# Patient Record
Sex: MALE | Race: WHITE | Employment: OTHER | URBAN - METROPOLITAN AREA
[De-identification: names, ages, dates, MRNs, and addresses within clinical notes are randomized per-mention and may not be internally consistent; named-entity substitution may affect disease eponyms.]

---

## 2017-01-01 ENCOUNTER — PATIENT OUTREACH (OUTPATIENT)
Dept: HEMATOLOGY | Age: 77
End: 2017-01-01

## 2017-01-01 ENCOUNTER — APPOINTMENT (OUTPATIENT)
Dept: ULTRASOUND IMAGING | Age: 77
End: 2017-01-01
Attending: PHYSICIAN ASSISTANT
Payer: MEDICARE

## 2017-01-01 ENCOUNTER — HOSPITAL ENCOUNTER (INPATIENT)
Age: 77
LOS: 4 days | Discharge: HOME HEALTH | DRG: 683 | End: 2017-03-04
Attending: EMERGENCY MEDICINE | Admitting: HOSPITALIST
Payer: MEDICARE

## 2017-01-01 ENCOUNTER — OFFICE VISIT (OUTPATIENT)
Dept: HEMATOLOGY | Age: 77
End: 2017-01-01

## 2017-01-01 ENCOUNTER — HOSPICE ADMISSION (OUTPATIENT)
Dept: HOSPICE | Facility: HOSPICE | Age: 77
End: 2017-01-01
Payer: MEDICARE

## 2017-01-01 ENCOUNTER — HOSPITAL ENCOUNTER (EMERGENCY)
Age: 77
Discharge: HOME | End: 2017-01-27
Attending: EMERGENCY MEDICINE
Payer: MEDICARE

## 2017-01-01 ENCOUNTER — HOME CARE VISIT (OUTPATIENT)
Dept: SCHEDULING | Facility: HOME HEALTH | Age: 77
End: 2017-01-01
Payer: MEDICARE

## 2017-01-01 ENCOUNTER — HOSPITAL ENCOUNTER (OUTPATIENT)
Dept: ULTRASOUND IMAGING | Age: 77
Discharge: HOME | End: 2017-03-13
Attending: INTERNAL MEDICINE
Payer: MEDICARE

## 2017-01-01 ENCOUNTER — APPOINTMENT (OUTPATIENT)
Dept: MRI IMAGING | Age: 77
DRG: 683 | End: 2017-01-01
Attending: HOSPITALIST
Payer: MEDICARE

## 2017-01-01 ENCOUNTER — HOME CARE VISIT (OUTPATIENT)
Dept: HOSPICE | Facility: HOSPICE | Age: 77
End: 2017-01-01
Payer: MEDICARE

## 2017-01-01 ENCOUNTER — APPOINTMENT (OUTPATIENT)
Dept: GENERAL RADIOLOGY | Age: 77
End: 2017-01-01
Attending: PHYSICIAN ASSISTANT
Payer: MEDICARE

## 2017-01-01 ENCOUNTER — HOSPITAL ENCOUNTER (OUTPATIENT)
Dept: MRI IMAGING | Age: 77
Discharge: HOME | End: 2017-01-19
Attending: INTERNAL MEDICINE
Payer: MEDICARE

## 2017-01-01 ENCOUNTER — HOSPITAL ENCOUNTER (OUTPATIENT)
Dept: INTERVENTIONAL RADIOLOGY/VASCULAR | Age: 77
Discharge: HOME | End: 2017-01-30
Attending: INTERNAL MEDICINE | Admitting: INTERNAL MEDICINE
Payer: MEDICARE

## 2017-01-01 ENCOUNTER — APPOINTMENT (OUTPATIENT)
Dept: NUCLEAR MEDICINE | Age: 77
End: 2017-01-01
Attending: INTERNAL MEDICINE
Payer: MEDICARE

## 2017-01-01 ENCOUNTER — HOSPITAL ENCOUNTER (OUTPATIENT)
Dept: INTERVENTIONAL RADIOLOGY/VASCULAR | Age: 77
Discharge: HOME | End: 2017-02-02
Attending: INTERNAL MEDICINE | Admitting: INTERNAL MEDICINE
Payer: MEDICARE

## 2017-01-01 ENCOUNTER — HOSPITAL ENCOUNTER (OUTPATIENT)
Dept: RADIATION THERAPY | Age: 77
Discharge: HOME | End: 2017-02-02
Attending: INTERNAL MEDICINE | Admitting: INTERNAL MEDICINE
Payer: MEDICARE

## 2017-01-01 ENCOUNTER — TELEPHONE (OUTPATIENT)
Dept: HEMATOLOGY | Age: 77
End: 2017-01-01

## 2017-01-01 ENCOUNTER — HOSPITAL ENCOUNTER (OUTPATIENT)
Dept: ULTRASOUND IMAGING | Age: 77
Discharge: HOME | End: 2017-02-13
Attending: INTERNAL MEDICINE
Payer: MEDICARE

## 2017-01-01 ENCOUNTER — HOSPITAL ENCOUNTER (OUTPATIENT)
Dept: INTERVENTIONAL RADIOLOGY/VASCULAR | Age: 77
Discharge: HOME | End: 2017-03-27
Attending: RADIOLOGY | Admitting: RADIOLOGY
Payer: MEDICARE

## 2017-01-01 ENCOUNTER — APPOINTMENT (OUTPATIENT)
Dept: ULTRASOUND IMAGING | Age: 77
DRG: 683 | End: 2017-01-01
Attending: HOSPITALIST
Payer: MEDICARE

## 2017-01-01 ENCOUNTER — HOSPITAL ENCOUNTER (OUTPATIENT)
Dept: RADIATION THERAPY | Age: 77
Discharge: HOME | End: 2017-01-31
Payer: MEDICARE

## 2017-01-01 ENCOUNTER — HOSPITAL ENCOUNTER (OUTPATIENT)
Dept: ULTRASOUND IMAGING | Age: 77
Discharge: HOME | End: 2017-02-23
Attending: INTERNAL MEDICINE
Payer: MEDICARE

## 2017-01-01 ENCOUNTER — HOSPITAL ENCOUNTER (OUTPATIENT)
Dept: ULTRASOUND IMAGING | Age: 77
Discharge: HOME | End: 2017-03-20
Attending: INTERNAL MEDICINE
Payer: MEDICARE

## 2017-01-01 ENCOUNTER — HOSPITAL ENCOUNTER (OUTPATIENT)
Dept: INTERVENTIONAL RADIOLOGY/VASCULAR | Age: 77
Discharge: HOME | End: 2017-03-22
Attending: INTERNAL MEDICINE | Admitting: INTERNAL MEDICINE
Payer: MEDICARE

## 2017-01-01 ENCOUNTER — APPOINTMENT (OUTPATIENT)
Dept: GENERAL RADIOLOGY | Age: 77
DRG: 683 | End: 2017-01-01
Attending: EMERGENCY MEDICINE
Payer: MEDICARE

## 2017-01-01 VITALS
SYSTOLIC BLOOD PRESSURE: 127 MMHG | RESPIRATION RATE: 20 BRPM | HEART RATE: 75 BPM | TEMPERATURE: 97.7 F | DIASTOLIC BLOOD PRESSURE: 45 MMHG | OXYGEN SATURATION: 100 %

## 2017-01-01 VITALS — SYSTOLIC BLOOD PRESSURE: 116 MMHG | HEART RATE: 76 BPM | RESPIRATION RATE: 18 BRPM | DIASTOLIC BLOOD PRESSURE: 64 MMHG

## 2017-01-01 VITALS
RESPIRATION RATE: 28 BRPM | DIASTOLIC BLOOD PRESSURE: 56 MMHG | HEART RATE: 108 BPM | OXYGEN SATURATION: 95 % | SYSTOLIC BLOOD PRESSURE: 110 MMHG

## 2017-01-01 VITALS
HEART RATE: 67 BPM | HEIGHT: 74 IN | WEIGHT: 290 LBS | OXYGEN SATURATION: 93 % | DIASTOLIC BLOOD PRESSURE: 53 MMHG | SYSTOLIC BLOOD PRESSURE: 118 MMHG | BODY MASS INDEX: 37.22 KG/M2 | TEMPERATURE: 97.2 F

## 2017-01-01 VITALS
RESPIRATION RATE: 14 BRPM | DIASTOLIC BLOOD PRESSURE: 40 MMHG | TEMPERATURE: 98.4 F | SYSTOLIC BLOOD PRESSURE: 129 MMHG | BODY MASS INDEX: 34.01 KG/M2 | WEIGHT: 265 LBS | OXYGEN SATURATION: 100 % | HEART RATE: 69 BPM | HEIGHT: 74 IN

## 2017-01-01 VITALS
WEIGHT: 267 LBS | HEIGHT: 74 IN | BODY MASS INDEX: 34.27 KG/M2 | HEART RATE: 71 BPM | RESPIRATION RATE: 20 BRPM | DIASTOLIC BLOOD PRESSURE: 53 MMHG | SYSTOLIC BLOOD PRESSURE: 108 MMHG | OXYGEN SATURATION: 99 % | TEMPERATURE: 98.2 F

## 2017-01-01 VITALS
TEMPERATURE: 97.8 F | HEIGHT: 74 IN | SYSTOLIC BLOOD PRESSURE: 126 MMHG | BODY MASS INDEX: 36.7 KG/M2 | OXYGEN SATURATION: 97 % | WEIGHT: 286 LBS | HEART RATE: 61 BPM | DIASTOLIC BLOOD PRESSURE: 61 MMHG | RESPIRATION RATE: 16 BRPM

## 2017-01-01 VITALS
DIASTOLIC BLOOD PRESSURE: 46 MMHG | HEART RATE: 81 BPM | OXYGEN SATURATION: 94 % | WEIGHT: 295 LBS | TEMPERATURE: 97.8 F | HEIGHT: 74 IN | RESPIRATION RATE: 22 BRPM | BODY MASS INDEX: 37.86 KG/M2 | SYSTOLIC BLOOD PRESSURE: 121 MMHG

## 2017-01-01 VITALS
SYSTOLIC BLOOD PRESSURE: 150 MMHG | BODY MASS INDEX: 33.65 KG/M2 | OXYGEN SATURATION: 99 % | DIASTOLIC BLOOD PRESSURE: 67 MMHG | TEMPERATURE: 98.2 F | WEIGHT: 262.2 LBS | HEART RATE: 62 BPM | HEIGHT: 74 IN | RESPIRATION RATE: 18 BRPM

## 2017-01-01 VITALS — BODY MASS INDEX: 36.21 KG/M2 | WEIGHT: 282 LBS

## 2017-01-01 VITALS
TEMPERATURE: 96.6 F | BODY MASS INDEX: 35.29 KG/M2 | DIASTOLIC BLOOD PRESSURE: 54 MMHG | HEART RATE: 69 BPM | WEIGHT: 275 LBS | SYSTOLIC BLOOD PRESSURE: 99 MMHG | HEIGHT: 74 IN

## 2017-01-01 VITALS
WEIGHT: 290 LBS | HEART RATE: 61 BPM | TEMPERATURE: 97.4 F | RESPIRATION RATE: 18 BRPM | BODY MASS INDEX: 37.22 KG/M2 | OXYGEN SATURATION: 96 % | HEIGHT: 74 IN | DIASTOLIC BLOOD PRESSURE: 58 MMHG | SYSTOLIC BLOOD PRESSURE: 146 MMHG

## 2017-01-01 VITALS
SYSTOLIC BLOOD PRESSURE: 164 MMHG | TEMPERATURE: 97.8 F | DIASTOLIC BLOOD PRESSURE: 60 MMHG | WEIGHT: 289 LBS | HEIGHT: 74 IN | RESPIRATION RATE: 18 BRPM | OXYGEN SATURATION: 98 % | BODY MASS INDEX: 37.09 KG/M2 | HEART RATE: 68 BPM

## 2017-01-01 VITALS
HEIGHT: 74 IN | HEART RATE: 82 BPM | WEIGHT: 280 LBS | DIASTOLIC BLOOD PRESSURE: 70 MMHG | OXYGEN SATURATION: 98 % | BODY MASS INDEX: 35.94 KG/M2 | TEMPERATURE: 97.9 F | SYSTOLIC BLOOD PRESSURE: 124 MMHG

## 2017-01-01 VITALS
DIASTOLIC BLOOD PRESSURE: 52 MMHG | HEART RATE: 81 BPM | RESPIRATION RATE: 21 BRPM | SYSTOLIC BLOOD PRESSURE: 141 MMHG | OXYGEN SATURATION: 97 %

## 2017-01-01 VITALS
BODY MASS INDEX: 34.27 KG/M2 | RESPIRATION RATE: 18 BRPM | HEART RATE: 80 BPM | SYSTOLIC BLOOD PRESSURE: 120 MMHG | WEIGHT: 267 LBS | HEIGHT: 74 IN | DIASTOLIC BLOOD PRESSURE: 57 MMHG | OXYGEN SATURATION: 97 %

## 2017-01-01 VITALS
RESPIRATION RATE: 16 BRPM | DIASTOLIC BLOOD PRESSURE: 43 MMHG | SYSTOLIC BLOOD PRESSURE: 109 MMHG | HEART RATE: 60 BPM | TEMPERATURE: 97.7 F | OXYGEN SATURATION: 98 %

## 2017-01-01 VITALS
SYSTOLIC BLOOD PRESSURE: 124 MMHG | RESPIRATION RATE: 16 BRPM | DIASTOLIC BLOOD PRESSURE: 60 MMHG | HEART RATE: 72 BPM | OXYGEN SATURATION: 96 %

## 2017-01-01 VITALS — DIASTOLIC BLOOD PRESSURE: 60 MMHG | RESPIRATION RATE: 20 BRPM | HEART RATE: 88 BPM | SYSTOLIC BLOOD PRESSURE: 110 MMHG

## 2017-01-01 DIAGNOSIS — K74.60 CIRRHOSIS OF LIVER WITH ASCITES, UNSPECIFIED HEPATIC CIRRHOSIS TYPE (HCC): ICD-10-CM

## 2017-01-01 DIAGNOSIS — R18.8 CIRRHOSIS OF LIVER WITH ASCITES, UNSPECIFIED HEPATIC CIRRHOSIS TYPE (HCC): Primary | ICD-10-CM

## 2017-01-01 DIAGNOSIS — C22.0 HEPATOCELLULAR CARCINOMA (HCC): Primary | ICD-10-CM

## 2017-01-01 DIAGNOSIS — C22.0 HEPATOCELLULAR CARCINOMA (HCC): ICD-10-CM

## 2017-01-01 DIAGNOSIS — R18.0 MALIGNANT ASCITES: ICD-10-CM

## 2017-01-01 DIAGNOSIS — R79.89 AZOTEMIA: Primary | ICD-10-CM

## 2017-01-01 DIAGNOSIS — K74.60 CIRRHOSIS OF LIVER WITH ASCITES, UNSPECIFIED HEPATIC CIRRHOSIS TYPE (HCC): Primary | ICD-10-CM

## 2017-01-01 DIAGNOSIS — R18.8 CIRRHOSIS OF LIVER WITH ASCITES, UNSPECIFIED HEPATIC CIRRHOSIS TYPE (HCC): ICD-10-CM

## 2017-01-01 DIAGNOSIS — C22.1 INTRAHEPATIC BILE DUCT CARCINOMA (HCC): ICD-10-CM

## 2017-01-01 DIAGNOSIS — D69.6 THROMBOCYTOPENIA (HCC): ICD-10-CM

## 2017-01-01 DIAGNOSIS — N17.9 ACUTE RENAL FAILURE, UNSPECIFIED ACUTE RENAL FAILURE TYPE (HCC): ICD-10-CM

## 2017-01-01 DIAGNOSIS — K83.1 BILE DUCT STRICTURE: Primary | ICD-10-CM

## 2017-01-01 DIAGNOSIS — R97.8 OTHER ABNORMAL TUMOR MARKERS: ICD-10-CM

## 2017-01-01 DIAGNOSIS — N17.9 AKI (ACUTE KIDNEY INJURY) (HCC): ICD-10-CM

## 2017-01-01 DIAGNOSIS — J44.1 COPD EXACERBATION (HCC): Primary | ICD-10-CM

## 2017-01-01 DIAGNOSIS — R18.8 OTHER ASCITES: ICD-10-CM

## 2017-01-01 DIAGNOSIS — I50.31 ACUTE DIASTOLIC HEART FAILURE (HCC): ICD-10-CM

## 2017-01-01 DIAGNOSIS — N45.1 EPIDIDYMITIS: ICD-10-CM

## 2017-01-01 DIAGNOSIS — E87.20 ACIDOSIS: ICD-10-CM

## 2017-01-01 DIAGNOSIS — R18.8 OTHER ASCITES: Primary | ICD-10-CM

## 2017-01-01 DIAGNOSIS — E86.0 DEHYDRATION: ICD-10-CM

## 2017-01-01 LAB
AFP L3 MFR SERPL: 23.8 % (ref 0–9.9)
AFP L3 MFR SERPL: 32.5 % (ref 0–9.9)
AFP SERPL-MCNC: 4.2 NG/ML (ref 0–8)
AFP SERPL-MCNC: 6.6 NG/ML (ref 0–8)
ALBUMIN FLD-MCNC: 1.6 G/DL
ALBUMIN SERPL BCP-MCNC: 2.2 G/DL (ref 3.5–5)
ALBUMIN SERPL BCP-MCNC: 2.5 G/DL (ref 3.5–5)
ALBUMIN SERPL BCP-MCNC: 2.6 G/DL (ref 3.5–5)
ALBUMIN SERPL-MCNC: 2.7 G/DL (ref 3.5–4.8)
ALBUMIN SERPL-MCNC: 3.2 G/DL (ref 3.5–4.8)
ALBUMIN SERPL-MCNC: 3.4 G/DL (ref 3.5–4.8)
ALBUMIN/GLOB SERPL: 0.6 {RATIO} (ref 1.1–2.2)
ALBUMIN/GLOB SERPL: 0.6 {RATIO} (ref 1.1–2.2)
ALBUMIN/GLOB SERPL: 0.8 {RATIO} (ref 1.1–2.2)
ALBUMIN/GLOB SERPL: 1.1 {RATIO} (ref 1.1–2.5)
ALP SERPL-CCNC: 222 IU/L (ref 39–117)
ALP SERPL-CCNC: 253 U/L (ref 45–117)
ALP SERPL-CCNC: 290 U/L (ref 45–117)
ALP SERPL-CCNC: 295 IU/L (ref 39–117)
ALP SERPL-CCNC: 314 U/L (ref 45–117)
ALP SERPL-CCNC: 378 IU/L (ref 39–117)
ALT SERPL-CCNC: 20 U/L (ref 12–78)
ALT SERPL-CCNC: 23 U/L (ref 12–78)
ALT SERPL-CCNC: 25 IU/L (ref 0–44)
ALT SERPL-CCNC: 27 IU/L (ref 0–44)
ALT SERPL-CCNC: 34 IU/L (ref 0–44)
ALT SERPL-CCNC: 37 U/L (ref 12–78)
ANION GAP BLD CALC-SCNC: 10 MMOL/L (ref 5–15)
ANION GAP BLD CALC-SCNC: 10 MMOL/L (ref 5–15)
ANION GAP BLD CALC-SCNC: 12 MMOL/L (ref 5–15)
ANION GAP BLD CALC-SCNC: 13 MMOL/L (ref 5–15)
ANION GAP BLD CALC-SCNC: 14 MMOL/L (ref 5–15)
ANION GAP BLD CALC-SCNC: 15 MMOL/L (ref 5–15)
APPEARANCE FLD: ABNORMAL
APPEARANCE FLD: ABNORMAL
APPEARANCE FLD: CLEAR
APPEARANCE FLD: CLEAR
APPEARANCE UR: CLEAR
APTT PPP: 35.6 SEC (ref 22.1–32.5)
AST SERPL W P-5'-P-CCNC: 26 U/L (ref 15–37)
AST SERPL W P-5'-P-CCNC: 29 U/L (ref 15–37)
AST SERPL W P-5'-P-CCNC: 50 U/L (ref 15–37)
AST SERPL-CCNC: 36 IU/L (ref 0–40)
AST SERPL-CCNC: 36 IU/L (ref 0–40)
AST SERPL-CCNC: 65 IU/L (ref 0–40)
ATRIAL RATE: 60 BPM
ATRIAL RATE: 69 BPM
BACTERIA SPEC CULT: NORMAL
BACTERIA URNS QL MICRO: NEGATIVE /HPF
BASOPHILS # BLD AUTO: 0 K/UL (ref 0–0.1)
BASOPHILS # BLD AUTO: 0 K/UL (ref 0–0.1)
BASOPHILS # BLD AUTO: 0 X10E3/UL (ref 0–0.2)
BASOPHILS # BLD: 0 % (ref 0–1)
BASOPHILS # BLD: 0 % (ref 0–1)
BASOPHILS NFR BLD AUTO: 0 %
BILIRUB DIRECT SERPL-MCNC: 0.22 MG/DL (ref 0–0.4)
BILIRUB DIRECT SERPL-MCNC: 0.25 MG/DL (ref 0–0.4)
BILIRUB DIRECT SERPL-MCNC: 0.33 MG/DL (ref 0–0.4)
BILIRUB SERPL-MCNC: 0.5 MG/DL (ref 0.2–1)
BILIRUB SERPL-MCNC: 0.6 MG/DL (ref 0–1.2)
BILIRUB SERPL-MCNC: 0.6 MG/DL (ref 0–1.2)
BILIRUB SERPL-MCNC: 0.7 MG/DL (ref 0.2–1)
BILIRUB SERPL-MCNC: 0.8 MG/DL (ref 0–1.2)
BILIRUB SERPL-MCNC: 1.1 MG/DL (ref 0.2–1)
BILIRUB UR QL: NEGATIVE
BNP SERPL-MCNC: 181 PG/ML (ref 0–100)
BUN SERPL-MCNC: 34 MG/DL (ref 8–27)
BUN SERPL-MCNC: 35 MG/DL (ref 8–27)
BUN SERPL-MCNC: 45 MG/DL (ref 6–20)
BUN SERPL-MCNC: 48 MG/DL (ref 8–27)
BUN SERPL-MCNC: 49 MG/DL (ref 6–20)
BUN SERPL-MCNC: 56 MG/DL (ref 6–20)
BUN SERPL-MCNC: 77 MG/DL (ref 6–20)
BUN SERPL-MCNC: 80 MG/DL (ref 6–20)
BUN SERPL-MCNC: 82 MG/DL (ref 6–20)
BUN/CREAT SERPL: 17 (ref 10–22)
BUN/CREAT SERPL: 18 (ref 10–22)
BUN/CREAT SERPL: 23 (ref 10–22)
BUN/CREAT SERPL: 24 (ref 12–20)
BUN/CREAT SERPL: 26 (ref 12–20)
BUN/CREAT SERPL: 30 (ref 12–20)
BUN/CREAT SERPL: 30 (ref 12–20)
BUN/CREAT SERPL: 32 (ref 12–20)
BUN/CREAT SERPL: 34 (ref 12–20)
CALCIUM SERPL-MCNC: 7.6 MG/DL (ref 8.5–10.1)
CALCIUM SERPL-MCNC: 7.6 MG/DL (ref 8.6–10.2)
CALCIUM SERPL-MCNC: 7.7 MG/DL (ref 8.5–10.1)
CALCIUM SERPL-MCNC: 7.8 MG/DL (ref 8.5–10.1)
CALCIUM SERPL-MCNC: 7.8 MG/DL (ref 8.5–10.1)
CALCIUM SERPL-MCNC: 8 MG/DL (ref 8.5–10.1)
CALCIUM SERPL-MCNC: 8.2 MG/DL (ref 8.6–10.2)
CALCIUM SERPL-MCNC: 8.4 MG/DL (ref 8.5–10.1)
CALCIUM SERPL-MCNC: 8.4 MG/DL (ref 8.6–10.2)
CALCULATED P AXIS, ECG09: -24 DEGREES
CALCULATED P AXIS, ECG09: 41 DEGREES
CALCULATED R AXIS, ECG10: 11 DEGREES
CALCULATED R AXIS, ECG10: 19 DEGREES
CALCULATED T AXIS, ECG11: 11 DEGREES
CALCULATED T AXIS, ECG11: 14 DEGREES
CANCER AG19-9 SERPL-ACNC: 35 U/ML (ref 0–35)
CEA SERPL-MCNC: 2.9 NG/ML (ref 0–4.7)
CHLORIDE SERPL-SCNC: 101 MMOL/L (ref 97–108)
CHLORIDE SERPL-SCNC: 101 MMOL/L (ref 97–108)
CHLORIDE SERPL-SCNC: 103 MMOL/L (ref 97–108)
CHLORIDE SERPL-SCNC: 104 MMOL/L (ref 97–108)
CHLORIDE SERPL-SCNC: 106 MMOL/L (ref 97–108)
CHLORIDE SERPL-SCNC: 94 MMOL/L (ref 96–106)
CHLORIDE SERPL-SCNC: 95 MMOL/L (ref 96–106)
CHLORIDE SERPL-SCNC: 97 MMOL/L (ref 96–106)
CHLORIDE SERPL-SCNC: 97 MMOL/L (ref 97–108)
CO2 SERPL-SCNC: 18 MMOL/L (ref 21–32)
CO2 SERPL-SCNC: 19 MMOL/L (ref 21–32)
CO2 SERPL-SCNC: 20 MMOL/L (ref 18–29)
CO2 SERPL-SCNC: 20 MMOL/L (ref 21–32)
CO2 SERPL-SCNC: 22 MMOL/L (ref 18–29)
CO2 SERPL-SCNC: 23 MMOL/L (ref 21–32)
CO2 SERPL-SCNC: 25 MMOL/L (ref 18–29)
COLOR FLD: YELLOW
COLOR UR: NORMAL
CORTIS SERPL-MCNC: 30 UG/DL
CREAT SERPL-MCNC: 1.51 MG/DL (ref 0.7–1.3)
CREAT SERPL-MCNC: 1.76 MG/DL (ref 0.7–1.3)
CREAT SERPL-MCNC: 1.95 MG/DL (ref 0.76–1.27)
CREAT SERPL-MCNC: 1.96 MG/DL (ref 0.76–1.27)
CREAT SERPL-MCNC: 2.06 MG/DL (ref 0.7–1.3)
CREAT SERPL-MCNC: 2.1 MG/DL (ref 0.76–1.27)
CREAT SERPL-MCNC: 2.26 MG/DL (ref 0.7–1.3)
CREAT SERPL-MCNC: 2.7 MG/DL (ref 0.7–1.3)
CREAT SERPL-MCNC: 3.11 MG/DL (ref 0.7–1.3)
CREAT UR-MCNC: 150 MG/DL
DIAGNOSIS, 93000: NORMAL
DIAGNOSIS, 93000: NORMAL
DIFFERENTIAL METHOD BLD: ABNORMAL
EOSINOPHIL # BLD AUTO: 0.2 X10E3/UL (ref 0–0.4)
EOSINOPHIL # BLD: 0 K/UL (ref 0–0.4)
EOSINOPHIL # BLD: 0.1 K/UL (ref 0–0.4)
EOSINOPHIL # FLD: 1 %
EOSINOPHIL NFR BLD AUTO: 2 %
EOSINOPHIL NFR BLD: 0 % (ref 0–7)
EOSINOPHIL NFR BLD: 1 % (ref 0–7)
EPITH CASTS URNS QL MICRO: NORMAL /LPF
ERYTHROCYTE [DISTWIDTH] IN BLOOD BY AUTOMATED COUNT: 13.9 % (ref 12.3–15.4)
ERYTHROCYTE [DISTWIDTH] IN BLOOD BY AUTOMATED COUNT: 14.2 % (ref 11.5–14.5)
ERYTHROCYTE [DISTWIDTH] IN BLOOD BY AUTOMATED COUNT: 15.2 % (ref 11.5–14.5)
ERYTHROCYTE [DISTWIDTH] IN BLOOD BY AUTOMATED COUNT: 15.3 % (ref 11.5–14.5)
ERYTHROCYTE [DISTWIDTH] IN BLOOD BY AUTOMATED COUNT: 15.3 % (ref 11.5–14.5)
ERYTHROCYTE [DISTWIDTH] IN BLOOD BY AUTOMATED COUNT: 15.4 % (ref 12.3–15.4)
GLOBULIN SER CALC-MCNC: 3 G/DL (ref 1.5–4.5)
GLOBULIN SER CALC-MCNC: 3.3 G/DL (ref 2–4)
GLOBULIN SER CALC-MCNC: 4 G/DL (ref 2–4)
GLOBULIN SER CALC-MCNC: 4.2 G/DL (ref 2–4)
GLUCOSE BLD STRIP.AUTO-MCNC: 111 MG/DL (ref 65–100)
GLUCOSE BLD STRIP.AUTO-MCNC: 113 MG/DL (ref 65–100)
GLUCOSE BLD STRIP.AUTO-MCNC: 115 MG/DL (ref 65–100)
GLUCOSE BLD STRIP.AUTO-MCNC: 119 MG/DL (ref 65–100)
GLUCOSE BLD STRIP.AUTO-MCNC: 135 MG/DL (ref 65–100)
GLUCOSE BLD STRIP.AUTO-MCNC: 146 MG/DL (ref 65–100)
GLUCOSE BLD STRIP.AUTO-MCNC: 166 MG/DL (ref 65–100)
GLUCOSE BLD STRIP.AUTO-MCNC: 185 MG/DL (ref 65–100)
GLUCOSE BLD STRIP.AUTO-MCNC: 198 MG/DL (ref 65–100)
GLUCOSE BLD STRIP.AUTO-MCNC: 202 MG/DL (ref 65–100)
GLUCOSE BLD STRIP.AUTO-MCNC: 240 MG/DL (ref 65–100)
GLUCOSE BLD STRIP.AUTO-MCNC: 242 MG/DL (ref 65–100)
GLUCOSE BLD STRIP.AUTO-MCNC: 262 MG/DL (ref 65–100)
GLUCOSE BLD STRIP.AUTO-MCNC: 332 MG/DL (ref 65–100)
GLUCOSE BLD STRIP.AUTO-MCNC: 60 MG/DL (ref 65–100)
GLUCOSE BLD STRIP.AUTO-MCNC: 72 MG/DL (ref 65–100)
GLUCOSE BLD STRIP.AUTO-MCNC: 80 MG/DL (ref 65–100)
GLUCOSE BLD STRIP.AUTO-MCNC: 85 MG/DL (ref 65–100)
GLUCOSE BLD STRIP.AUTO-MCNC: 88 MG/DL (ref 65–100)
GLUCOSE SERPL-MCNC: 103 MG/DL (ref 65–99)
GLUCOSE SERPL-MCNC: 116 MG/DL (ref 65–100)
GLUCOSE SERPL-MCNC: 143 MG/DL (ref 65–100)
GLUCOSE SERPL-MCNC: 151 MG/DL (ref 65–100)
GLUCOSE SERPL-MCNC: 212 MG/DL (ref 65–100)
GLUCOSE SERPL-MCNC: 233 MG/DL (ref 65–99)
GLUCOSE SERPL-MCNC: 259 MG/DL (ref 65–100)
GLUCOSE SERPL-MCNC: 276 MG/DL (ref 65–99)
GLUCOSE SERPL-MCNC: 76 MG/DL (ref 65–100)
GLUCOSE UR STRIP.AUTO-MCNC: NEGATIVE MG/DL
HCT VFR BLD AUTO: 24.1 % (ref 36.6–50.3)
HCT VFR BLD AUTO: 27.5 % (ref 36.6–50.3)
HCT VFR BLD AUTO: 27.6 % (ref 36.6–50.3)
HCT VFR BLD AUTO: 29.4 % (ref 37.5–51)
HCT VFR BLD AUTO: 29.7 % (ref 36.6–50.3)
HCT VFR BLD AUTO: 34.3 % (ref 37.5–51)
HGB BLD-MCNC: 10.9 G/DL (ref 12.6–17.7)
HGB BLD-MCNC: 8.3 G/DL (ref 12.1–17)
HGB BLD-MCNC: 9.3 G/DL (ref 12.1–17)
HGB BLD-MCNC: 9.4 G/DL (ref 12.1–17)
HGB BLD-MCNC: 9.6 G/DL (ref 12.6–17.7)
HGB BLD-MCNC: 9.9 G/DL (ref 12.1–17)
HGB UR QL STRIP: NEGATIVE
HYALINE CASTS URNS QL MICRO: NORMAL /LPF (ref 0–5)
IMM GRANULOCYTES # BLD: 0 X10E3/UL (ref 0–0.1)
IMM GRANULOCYTES NFR BLD: 0 %
INR PPP: 1.1 (ref 0.8–1.2)
INR PPP: 1.1 (ref 0.9–1.1)
KETONES UR QL STRIP.AUTO: NEGATIVE MG/DL
LACTATE SERPL-SCNC: 1.5 MMOL/L (ref 0.4–2)
LACTATE SERPL-SCNC: 2 MMOL/L (ref 0.4–2)
LACTATE SERPL-SCNC: 2.4 MMOL/L (ref 0.4–2)
LEUKOCYTE ESTERASE UR QL STRIP.AUTO: NEGATIVE
LYMPHOCYTES # BLD AUTO: 0.7 X10E3/UL (ref 0.7–3.1)
LYMPHOCYTES # BLD AUTO: 1 % (ref 12–49)
LYMPHOCYTES # BLD AUTO: 7 % (ref 12–49)
LYMPHOCYTES # BLD: 0.1 K/UL (ref 0.8–3.5)
LYMPHOCYTES # BLD: 1 K/UL (ref 0.8–3.5)
LYMPHOCYTES NFR BLD AUTO: 8 %
LYMPHOCYTES NFR FLD: 15 %
LYMPHOCYTES NFR FLD: 17 %
LYMPHOCYTES NFR FLD: 20 %
LYMPHOCYTES NFR FLD: 9 %
MAGNESIUM SERPL-MCNC: 2.1 MG/DL (ref 1.6–2.4)
MAGNESIUM SERPL-MCNC: 2.1 MG/DL (ref 1.6–2.4)
MCH RBC QN AUTO: 33.1 PG (ref 26.6–33)
MCH RBC QN AUTO: 33.7 PG (ref 26–34)
MCH RBC QN AUTO: 33.7 PG (ref 26–34)
MCH RBC QN AUTO: 33.8 PG (ref 26–34)
MCH RBC QN AUTO: 34 PG (ref 26–34)
MCH RBC QN AUTO: 34.3 PG (ref 26.6–33)
MCHC RBC AUTO-ENTMCNC: 31.8 G/DL (ref 31.5–35.7)
MCHC RBC AUTO-ENTMCNC: 32.7 G/DL (ref 31.5–35.7)
MCHC RBC AUTO-ENTMCNC: 33.3 G/DL (ref 30–36.5)
MCHC RBC AUTO-ENTMCNC: 33.8 G/DL (ref 30–36.5)
MCHC RBC AUTO-ENTMCNC: 34.1 G/DL (ref 30–36.5)
MCHC RBC AUTO-ENTMCNC: 34.4 G/DL (ref 30–36.5)
MCV RBC AUTO: 100 FL (ref 80–99)
MCV RBC AUTO: 102.1 FL (ref 80–99)
MCV RBC AUTO: 104 FL (ref 79–97)
MCV RBC AUTO: 105 FL (ref 79–97)
MCV RBC AUTO: 98 FL (ref 80–99)
MCV RBC AUTO: 98.9 FL (ref 80–99)
MESOTHL CELL NFR FLD: 26 %
MESOTHL CELL NFR FLD: 7 %
MESOTHL CELL NFR FLD: 8 %
MESOTHL CELL NFR FLD: 8 %
MONOCYTES # BLD AUTO: 0.6 X10E3/UL (ref 0.1–0.9)
MONOCYTES # BLD: 0.9 K/UL (ref 0–1)
MONOCYTES # BLD: 1.2 K/UL (ref 0–1)
MONOCYTES NFR BLD AUTO: 12 % (ref 5–13)
MONOCYTES NFR BLD AUTO: 7 %
MONOCYTES NFR BLD AUTO: 9 % (ref 5–13)
MONOS+MACROS NFR FLD: 25 %
MONOS+MACROS NFR FLD: 50 %
MONOS+MACROS NFR FLD: 52 %
MONOS+MACROS NFR FLD: 62 %
MYELOCYTES NFR BLD MANUAL: 1 %
NEUTROPHILS # BLD AUTO: 7.4 X10E3/UL (ref 1.4–7)
NEUTROPHILS NFR BLD AUTO: 83 %
NEUTS SEG # BLD: 11.9 K/UL (ref 1.8–8)
NEUTS SEG # BLD: 6.3 K/UL (ref 1.8–8)
NEUTS SEG NFR BLD AUTO: 83 % (ref 32–75)
NEUTS SEG NFR BLD AUTO: 86 % (ref 32–75)
NEUTS SEG NFR FLD: 14 %
NEUTS SEG NFR FLD: 24 %
NEUTS SEG NFR FLD: 29 %
NEUTS SEG NFR FLD: 33 %
NITRITE UR QL STRIP.AUTO: NEGATIVE
NUC CELL # FLD: 184 /CU MM (ref 0–5)
NUC CELL # FLD: 209 /CU MM (ref 0–5)
NUC CELL # FLD: 455 /CU MM (ref 0–5)
NUC CELL # FLD: 629 /CU MM (ref 0–5)
OSMOLALITY UR: 348 MOSM/KG H2O
P-R INTERVAL, ECG05: 204 MS
P-R INTERVAL, ECG05: 204 MS
PATH REV BLD -IMP: ABNORMAL
PH UR STRIP: 5 [PH] (ref 5–8)
PHOSPHATE SERPL-MCNC: 4.7 MG/DL (ref 2.6–4.7)
PHOSPHATE SERPL-MCNC: 5.7 MG/DL (ref 2.6–4.7)
PLATELET # BLD AUTO: 109 K/UL (ref 150–400)
PLATELET # BLD AUTO: 119 K/UL (ref 150–400)
PLATELET # BLD AUTO: 145 K/UL (ref 150–400)
PLATELET # BLD AUTO: 175 K/UL (ref 150–400)
PLATELET # BLD AUTO: 178 X10E3/UL (ref 150–379)
PLATELET # BLD AUTO: 180 X10E3/UL (ref 150–379)
POTASSIUM SERPL-SCNC: 3.5 MMOL/L (ref 3.5–5.1)
POTASSIUM SERPL-SCNC: 3.7 MMOL/L (ref 3.5–5.1)
POTASSIUM SERPL-SCNC: 3.7 MMOL/L (ref 3.5–5.1)
POTASSIUM SERPL-SCNC: 3.8 MMOL/L (ref 3.5–5.1)
POTASSIUM SERPL-SCNC: 3.8 MMOL/L (ref 3.5–5.2)
POTASSIUM SERPL-SCNC: 4 MMOL/L (ref 3.5–5.1)
POTASSIUM SERPL-SCNC: 4.2 MMOL/L (ref 3.5–5.2)
POTASSIUM SERPL-SCNC: 4.3 MMOL/L (ref 3.5–5.1)
POTASSIUM SERPL-SCNC: 4.7 MMOL/L (ref 3.5–5.2)
PROT SERPL-MCNC: 5.5 G/DL (ref 6–8.5)
PROT SERPL-MCNC: 5.8 G/DL (ref 6.4–8.2)
PROT SERPL-MCNC: 6 G/DL (ref 6–8.5)
PROT SERPL-MCNC: 6.2 G/DL (ref 6.4–8.2)
PROT SERPL-MCNC: 6.4 G/DL (ref 6–8.5)
PROT SERPL-MCNC: 6.8 G/DL (ref 6.4–8.2)
PROT UR STRIP-MCNC: NEGATIVE MG/DL
PROTHROMBIN TIME: 10.7 SEC (ref 9–11.1)
PROTHROMBIN TIME: 11.2 SEC (ref 9.1–12)
Q-T INTERVAL, ECG07: 410 MS
Q-T INTERVAL, ECG07: 446 MS
QRS DURATION, ECG06: 80 MS
QRS DURATION, ECG06: 94 MS
QTC CALCULATION (BEZET), ECG08: 439 MS
QTC CALCULATION (BEZET), ECG08: 446 MS
RBC # BLD AUTO: 2.46 M/UL (ref 4.1–5.7)
RBC # BLD AUTO: 2.75 M/UL (ref 4.1–5.7)
RBC # BLD AUTO: 2.79 M/UL (ref 4.1–5.7)
RBC # BLD AUTO: 2.8 X10E6/UL (ref 4.14–5.8)
RBC # BLD AUTO: 2.91 M/UL (ref 4.1–5.7)
RBC # BLD AUTO: 3.29 X10E6/UL (ref 4.14–5.8)
RBC # FLD: >100 /CU MM
RBC #/AREA URNS HPF: NORMAL /HPF (ref 0–5)
RBC MORPH BLD: ABNORMAL
SERVICE CMNT-IMP: ABNORMAL
SERVICE CMNT-IMP: NORMAL
SODIUM SERPL-SCNC: 130 MMOL/L (ref 134–144)
SODIUM SERPL-SCNC: 130 MMOL/L (ref 136–145)
SODIUM SERPL-SCNC: 132 MMOL/L (ref 136–145)
SODIUM SERPL-SCNC: 134 MMOL/L (ref 134–144)
SODIUM SERPL-SCNC: 134 MMOL/L (ref 136–145)
SODIUM SERPL-SCNC: 135 MMOL/L (ref 136–145)
SODIUM SERPL-SCNC: 135 MMOL/L (ref 136–145)
SODIUM SERPL-SCNC: 136 MMOL/L (ref 136–145)
SODIUM SERPL-SCNC: 138 MMOL/L (ref 134–144)
SODIUM UR-SCNC: 11 MMOL/L
SP GR UR REFRACTOMETRY: 1.02 (ref 1–1.03)
SPECIMEN SOURCE FLD: ABNORMAL
SPECIMEN SOURCE FLD: NORMAL
THERAPEUTIC RANGE,PTTT: ABNORMAL SECS (ref 58–77)
TROPONIN I SERPL-MCNC: <0.04 NG/ML
TSH SERPL DL<=0.05 MIU/L-ACNC: 1.79 UIU/ML (ref 0.36–3.74)
UA: UC IF INDICATED,UAUC: NORMAL
UROBILINOGEN UR QL STRIP.AUTO: 0.2 EU/DL (ref 0.2–1)
VENTRICULAR RATE, ECG03: 60 BPM
VENTRICULAR RATE, ECG03: 69 BPM
WBC # BLD AUTO: 10.5 X10E3/UL (ref 3.4–10.8)
WBC # BLD AUTO: 14.2 K/UL (ref 4.1–11.1)
WBC # BLD AUTO: 6 K/UL (ref 4.1–11.1)
WBC # BLD AUTO: 7.1 K/UL (ref 4.1–11.1)
WBC # BLD AUTO: 7.3 K/UL (ref 4.1–11.1)
WBC # BLD AUTO: 9 X10E3/UL (ref 3.4–10.8)
WBC URNS QL MICRO: NORMAL /HPF (ref 0–4)

## 2017-01-01 PROCEDURE — P9047 ALBUMIN (HUMAN), 25%, 50ML: HCPCS | Performed by: INTERNAL MEDICINE

## 2017-01-01 PROCEDURE — A9585 GADOBUTROL INJECTION: HCPCS | Performed by: HOSPITALIST

## 2017-01-01 PROCEDURE — 83735 ASSAY OF MAGNESIUM: CPT | Performed by: HOSPITALIST

## 2017-01-01 PROCEDURE — 74011250637 HC RX REV CODE- 250/637: Performed by: NURSE PRACTITIONER

## 2017-01-01 PROCEDURE — 0651 HSPC ROUTINE HOME CARE

## 2017-01-01 PROCEDURE — P9047 ALBUMIN (HUMAN), 25%, 50ML: HCPCS | Performed by: RADIOLOGY

## 2017-01-01 PROCEDURE — 36415 COLL VENOUS BLD VENIPUNCTURE: CPT | Performed by: EMERGENCY MEDICINE

## 2017-01-01 PROCEDURE — 77030029684 HC NEB SM VOL KT MONA -A

## 2017-01-01 PROCEDURE — 77030021566 MRI ABD W WO CONT

## 2017-01-01 PROCEDURE — 74011250636 HC RX REV CODE- 250/636: Performed by: RADIOLOGY

## 2017-01-01 PROCEDURE — T4541 LARGE DISPOSABLE UNDERPAD: HCPCS

## 2017-01-01 PROCEDURE — G8988 SELF CARE GOAL STATUS: HCPCS

## 2017-01-01 PROCEDURE — 77030002996 HC SUT SLK J&J -A

## 2017-01-01 PROCEDURE — 77030032490 HC SLV COMPR SCD KNE COVD -B

## 2017-01-01 PROCEDURE — 77030009378 HC DEV TORQ OLCT F/GWIRE MANIP COOK -A

## 2017-01-01 PROCEDURE — 74011250636 HC RX REV CODE- 250/636

## 2017-01-01 PROCEDURE — 97116 GAIT TRAINING THERAPY: CPT

## 2017-01-01 PROCEDURE — 74011636637 HC RX REV CODE- 636/637: Performed by: HOSPITALIST

## 2017-01-01 PROCEDURE — 74011000250 HC RX REV CODE- 250: Performed by: RADIOLOGY

## 2017-01-01 PROCEDURE — 49083 ABD PARACENTESIS W/IMAGING: CPT

## 2017-01-01 PROCEDURE — 89050 BODY FLUID CELL COUNT: CPT | Performed by: INTERNAL MEDICINE

## 2017-01-01 PROCEDURE — A9270 NON-COVERED ITEM OR SERVICE: HCPCS

## 2017-01-01 PROCEDURE — T4543 ADULT DISP BRIEF/DIAP ABV XL: HCPCS

## 2017-01-01 PROCEDURE — 74011250636 HC RX REV CODE- 250/636: Performed by: EMERGENCY MEDICINE

## 2017-01-01 PROCEDURE — A6216 NON-STERILE GAUZE<=16 SQ IN: HCPCS

## 2017-01-01 PROCEDURE — 84100 ASSAY OF PHOSPHORUS: CPT | Performed by: HOSPITALIST

## 2017-01-01 PROCEDURE — 74011250636 HC RX REV CODE- 250/636: Performed by: INTERNAL MEDICINE

## 2017-01-01 PROCEDURE — 94640 AIRWAY INHALATION TREATMENT: CPT

## 2017-01-01 PROCEDURE — P9045 ALBUMIN (HUMAN), 5%, 250 ML: HCPCS | Performed by: HOSPITALIST

## 2017-01-01 PROCEDURE — C1769 GUIDE WIRE: HCPCS

## 2017-01-01 PROCEDURE — 82042 OTHER SOURCE ALBUMIN QUAN EA: CPT | Performed by: RADIOLOGY

## 2017-01-01 PROCEDURE — G0299 HHS/HOSPICE OF RN EA 15 MIN: HCPCS

## 2017-01-01 PROCEDURE — E0276 BED PAN FRACTURE: HCPCS

## 2017-01-01 PROCEDURE — C1729 CATH, DRAINAGE: HCPCS

## 2017-01-01 PROCEDURE — 65270000029 HC RM PRIVATE

## 2017-01-01 PROCEDURE — 76770 US EXAM ABDO BACK WALL COMP: CPT

## 2017-01-01 PROCEDURE — HHS10554 SHAMPOO/BODY WASH 8 OZ ALOE VESTA

## 2017-01-01 PROCEDURE — A6250 SKIN SEAL PROTECT MOISTURIZR: HCPCS

## 2017-01-01 PROCEDURE — 74011250637 HC RX REV CODE- 250/637: Performed by: HOSPITALIST

## 2017-01-01 PROCEDURE — A9270 NON-COVERED ITEM OR SERVICE: HCPCS | Performed by: HOSPITALIST

## 2017-01-01 PROCEDURE — A6260 WOUND CLEANSER ANY TYPE/SIZE: HCPCS

## 2017-01-01 PROCEDURE — 82533 TOTAL CORTISOL: CPT | Performed by: HOSPITALIST

## 2017-01-01 PROCEDURE — 74011636320 HC RX REV CODE- 636/320: Performed by: RADIOLOGY

## 2017-01-01 PROCEDURE — 74011250637 HC RX REV CODE- 250/637: Performed by: INTERNAL MEDICINE

## 2017-01-01 PROCEDURE — A9585 GADOBUTROL INJECTION: HCPCS | Performed by: INTERNAL MEDICINE

## 2017-01-01 PROCEDURE — 37243 VASC EMBOLIZE/OCCLUDE ORGAN: CPT

## 2017-01-01 PROCEDURE — 36415 COLL VENOUS BLD VENIPUNCTURE: CPT | Performed by: HOSPITALIST

## 2017-01-01 PROCEDURE — 82962 GLUCOSE BLOOD TEST: CPT

## 2017-01-01 PROCEDURE — 93005 ELECTROCARDIOGRAM TRACING: CPT

## 2017-01-01 PROCEDURE — 74011000258 HC RX REV CODE- 258: Performed by: HOSPITALIST

## 2017-01-01 PROCEDURE — 97161 PT EVAL LOW COMPLEX 20 MIN: CPT

## 2017-01-01 PROCEDURE — 99284 EMERGENCY DEPT VISIT MOD MDM: CPT

## 2017-01-01 PROCEDURE — 74011250636 HC RX REV CODE- 250/636: Performed by: HOSPITALIST

## 2017-01-01 PROCEDURE — G8987 SELF CARE CURRENT STATUS: HCPCS

## 2017-01-01 PROCEDURE — 83935 ASSAY OF URINE OSMOLALITY: CPT | Performed by: HOSPITALIST

## 2017-01-01 PROCEDURE — P9045 ALBUMIN (HUMAN), 5%, 250 ML: HCPCS | Performed by: INTERNAL MEDICINE

## 2017-01-01 PROCEDURE — P9047 ALBUMIN (HUMAN), 25%, 50ML: HCPCS | Performed by: HOSPITALIST

## 2017-01-01 PROCEDURE — 77030031139 HC SUT VCRL2 J&J -A

## 2017-01-01 PROCEDURE — 85730 THROMBOPLASTIN TIME PARTIAL: CPT | Performed by: EMERGENCY MEDICINE

## 2017-01-01 PROCEDURE — 77470 SPECIAL RADIATION TREATMENT: CPT

## 2017-01-01 PROCEDURE — 99152 MOD SED SAME PHYS/QHP 5/>YRS: CPT

## 2017-01-01 PROCEDURE — 84484 ASSAY OF TROPONIN QUANT: CPT | Performed by: PHYSICIAN ASSISTANT

## 2017-01-01 PROCEDURE — C2616 BRACHYTX, NON-STR,YTTRIUM-90: HCPCS

## 2017-01-01 PROCEDURE — 49418 INSERT TUN IP CATH PERC: CPT

## 2017-01-01 PROCEDURE — 96365 THER/PROPH/DIAG IV INF INIT: CPT

## 2017-01-01 PROCEDURE — 80048 BASIC METABOLIC PNL TOTAL CA: CPT | Performed by: HOSPITALIST

## 2017-01-01 PROCEDURE — 71020 XR CHEST PA LAT: CPT

## 2017-01-01 PROCEDURE — 83605 ASSAY OF LACTIC ACID: CPT | Performed by: HOSPITALIST

## 2017-01-01 PROCEDURE — G0300 HHS/HOSPICE OF LPN EA 15 MIN: HCPCS

## 2017-01-01 PROCEDURE — 97165 OT EVAL LOW COMPLEX 30 MIN: CPT

## 2017-01-01 PROCEDURE — 87040 BLOOD CULTURE FOR BACTERIA: CPT | Performed by: HOSPITALIST

## 2017-01-01 PROCEDURE — C1892 INTRO/SHEATH,FIXED,PEEL-AWAY: HCPCS

## 2017-01-01 PROCEDURE — HOSPICE MEDICATION HC HH HOSPICE MEDICATION

## 2017-01-01 PROCEDURE — 83880 ASSAY OF NATRIURETIC PEPTIDE: CPT | Performed by: PHYSICIAN ASSISTANT

## 2017-01-01 PROCEDURE — 99285 EMERGENCY DEPT VISIT HI MDM: CPT

## 2017-01-01 PROCEDURE — A4927 NON-STERILE GLOVES: HCPCS

## 2017-01-01 PROCEDURE — 77030014021 HC SYR ANGI FIX LOK MRTM -A

## 2017-01-01 PROCEDURE — 74011250637 HC RX REV CODE- 250/637: Performed by: PHYSICIAN ASSISTANT

## 2017-01-01 PROCEDURE — 82107 ALPHA-FETOPROTEIN L3: CPT | Performed by: INTERNAL MEDICINE

## 2017-01-01 PROCEDURE — G8979 MOBILITY GOAL STATUS: HCPCS

## 2017-01-01 PROCEDURE — 84443 ASSAY THYROID STIM HORMONE: CPT | Performed by: HOSPITALIST

## 2017-01-01 PROCEDURE — G8978 MOBILITY CURRENT STATUS: HCPCS

## 2017-01-01 PROCEDURE — 74011250636 HC RX REV CODE- 250/636: Performed by: PHYSICIAN ASSISTANT

## 2017-01-01 PROCEDURE — 74183 MRI ABD W/O CNTR FLWD CNTR: CPT

## 2017-01-01 PROCEDURE — G0269 OCCLUSIVE DEVICE IN VEIN ART: HCPCS

## 2017-01-01 PROCEDURE — 0W9G30Z DRAINAGE OF PERITONEAL CAVITY WITH DRAINAGE DEVICE, PERCUTANEOUS APPROACH: ICD-10-PCS | Performed by: RADIOLOGY

## 2017-01-01 PROCEDURE — 80048 BASIC METABOLIC PNL TOTAL CA: CPT | Performed by: INTERNAL MEDICINE

## 2017-01-01 PROCEDURE — C1894 INTRO/SHEATH, NON-LASER: HCPCS

## 2017-01-01 PROCEDURE — 77030010545

## 2017-01-01 PROCEDURE — 82570 ASSAY OF URINE CREATININE: CPT | Performed by: HOSPITALIST

## 2017-01-01 PROCEDURE — C1887 CATHETER, GUIDING: HCPCS

## 2017-01-01 PROCEDURE — E0325 URINAL MALE JUG-TYPE: HCPCS

## 2017-01-01 PROCEDURE — 85027 COMPLETE CBC AUTOMATED: CPT | Performed by: INTERNAL MEDICINE

## 2017-01-01 PROCEDURE — 96375 TX/PRO/DX INJ NEW DRUG ADDON: CPT

## 2017-01-01 PROCEDURE — A9540 TC99M MAA: HCPCS

## 2017-01-01 PROCEDURE — 36415 COLL VENOUS BLD VENIPUNCTURE: CPT | Performed by: INTERNAL MEDICINE

## 2017-01-01 PROCEDURE — 77790 RADIATION HANDLING: CPT

## 2017-01-01 PROCEDURE — C1760 CLOSURE DEV, VASC: HCPCS

## 2017-01-01 PROCEDURE — 76870 US EXAM SCROTUM: CPT

## 2017-01-01 PROCEDURE — 85610 PROTHROMBIN TIME: CPT | Performed by: EMERGENCY MEDICINE

## 2017-01-01 PROCEDURE — A4314 CATH W/DRAINAGE 2-WAY LATEX: HCPCS

## 2017-01-01 PROCEDURE — 77030019698 HC SYR ANGI MDLON MRTM -A

## 2017-01-01 PROCEDURE — 36415 COLL VENOUS BLD VENIPUNCTURE: CPT | Performed by: PHYSICIAN ASSISTANT

## 2017-01-01 PROCEDURE — 77030010507 HC ADH SKN DERMBND J&J -B

## 2017-01-01 PROCEDURE — 77300 RADIATION THERAPY DOSE PLAN: CPT

## 2017-01-01 PROCEDURE — 96360 HYDRATION IV INFUSION INIT: CPT

## 2017-01-01 PROCEDURE — 85027 COMPLETE CBC AUTOMATED: CPT | Performed by: HOSPITALIST

## 2017-01-01 PROCEDURE — 85025 COMPLETE CBC W/AUTO DIFF WBC: CPT | Performed by: EMERGENCY MEDICINE

## 2017-01-01 PROCEDURE — 80053 COMPREHEN METABOLIC PANEL: CPT | Performed by: PHYSICIAN ASSISTANT

## 2017-01-01 PROCEDURE — P9047 ALBUMIN (HUMAN), 25%, 50ML: HCPCS

## 2017-01-01 PROCEDURE — 74011000250 HC RX REV CODE- 250: Performed by: PHYSICIAN ASSISTANT

## 2017-01-01 PROCEDURE — 3336500001 HSPC ELECTION

## 2017-01-01 PROCEDURE — 80053 COMPREHEN METABOLIC PANEL: CPT | Performed by: EMERGENCY MEDICINE

## 2017-01-01 PROCEDURE — 80053 COMPREHEN METABOLIC PANEL: CPT | Performed by: HOSPITALIST

## 2017-01-01 PROCEDURE — 81001 URINALYSIS AUTO W/SCOPE: CPT | Performed by: HOSPITALIST

## 2017-01-01 PROCEDURE — 85025 COMPLETE CBC W/AUTO DIFF WBC: CPT | Performed by: PHYSICIAN ASSISTANT

## 2017-01-01 PROCEDURE — 96366 THER/PROPH/DIAG IV INF ADDON: CPT

## 2017-01-01 PROCEDURE — 77030003666 HC NDL SPINAL BD -A

## 2017-01-01 PROCEDURE — 97530 THERAPEUTIC ACTIVITIES: CPT

## 2017-01-01 PROCEDURE — 77030005208 HC CATH HLDR GLWC -A

## 2017-01-01 PROCEDURE — 84300 ASSAY OF URINE SODIUM: CPT | Performed by: HOSPITALIST

## 2017-01-01 PROCEDURE — 76060000032 HC ANESTHESIA 0.5 TO 1 HR

## 2017-01-01 PROCEDURE — 49082 ABD PARACENTESIS: CPT

## 2017-01-01 RX ORDER — ALBUTEROL SULFATE 0.83 MG/ML
5 SOLUTION RESPIRATORY (INHALATION)
Status: COMPLETED | OUTPATIENT
Start: 2017-01-01 | End: 2017-01-01

## 2017-01-01 RX ORDER — SODIUM BICARBONATE 325 MG/1
325 TABLET ORAL 2 TIMES DAILY
Qty: 60 TAB | Refills: 0 | Status: SHIPPED | OUTPATIENT
Start: 2017-01-01 | End: 2017-01-01

## 2017-01-01 RX ORDER — ALBUMIN HUMAN 250 G/1000ML
25 SOLUTION INTRAVENOUS
Status: COMPLETED | OUTPATIENT
Start: 2017-01-01 | End: 2017-01-01

## 2017-01-01 RX ORDER — GUAIFENESIN 100 MG/5ML
81 LIQUID (ML) ORAL DAILY
COMMUNITY
End: 2017-01-01

## 2017-01-01 RX ORDER — LIDOCAINE HYDROCHLORIDE 20 MG/ML
20 INJECTION, SOLUTION INFILTRATION; PERINEURAL
Status: COMPLETED | OUTPATIENT
Start: 2017-01-01 | End: 2017-01-01

## 2017-01-01 RX ORDER — ALBUMIN HUMAN 250 G/1000ML
25 SOLUTION INTRAVENOUS
Status: DISCONTINUED | OUTPATIENT
Start: 2017-01-01 | End: 2017-01-01 | Stop reason: HOSPADM

## 2017-01-01 RX ORDER — LIDOCAINE HYDROCHLORIDE 10 MG/ML
1 INJECTION INFILTRATION; PERINEURAL
Status: COMPLETED | OUTPATIENT
Start: 2017-01-01 | End: 2017-01-01

## 2017-01-01 RX ORDER — ALBUMIN HUMAN 250 G/1000ML
12.5 SOLUTION INTRAVENOUS
Status: DISCONTINUED | OUTPATIENT
Start: 2017-01-01 | End: 2017-01-01 | Stop reason: HOSPADM

## 2017-01-01 RX ORDER — MAGNESIUM SULFATE 100 %
4 CRYSTALS MISCELLANEOUS AS NEEDED
Status: DISCONTINUED | OUTPATIENT
Start: 2017-01-01 | End: 2017-01-01 | Stop reason: HOSPADM

## 2017-01-01 RX ORDER — ALBUMIN HUMAN 50 G/1000ML
25 SOLUTION INTRAVENOUS EVERY 6 HOURS
Status: DISCONTINUED | OUTPATIENT
Start: 2017-01-01 | End: 2017-01-01 | Stop reason: SDUPTHER

## 2017-01-01 RX ORDER — ALBUMIN HUMAN 250 G/1000ML
12.5 SOLUTION INTRAVENOUS ONCE
Status: COMPLETED | OUTPATIENT
Start: 2017-01-01 | End: 2017-01-01

## 2017-01-01 RX ORDER — BACLOFEN 10 MG/1
10 TABLET ORAL
Qty: 90 TAB | Refills: 3 | OUTPATIENT
Start: 2017-01-01 | End: 2017-01-01

## 2017-01-01 RX ORDER — CEFAZOLIN SODIUM IN 0.9 % NACL 2 G/50 ML
2 INTRAVENOUS SOLUTION, PIGGYBACK (ML) INTRAVENOUS ONCE
Status: COMPLETED | OUTPATIENT
Start: 2017-01-01 | End: 2017-01-01

## 2017-01-01 RX ORDER — ALBUMIN HUMAN 50 G/1000ML
25 SOLUTION INTRAVENOUS EVERY 6 HOURS
Status: DISCONTINUED | OUTPATIENT
Start: 2017-01-01 | End: 2017-01-01

## 2017-01-01 RX ORDER — SODIUM CHLORIDE 0.9 % (FLUSH) 0.9 %
5-10 SYRINGE (ML) INJECTION EVERY 8 HOURS
Status: DISCONTINUED | OUTPATIENT
Start: 2017-01-01 | End: 2017-01-01 | Stop reason: HOSPADM

## 2017-01-01 RX ORDER — BUMETANIDE 2 MG/1
2 TABLET ORAL DAILY
COMMUNITY

## 2017-01-01 RX ORDER — ALBUTEROL SULFATE 90 UG/1
3 AEROSOL, METERED RESPIRATORY (INHALATION)
Status: DISCONTINUED | OUTPATIENT
Start: 2017-01-01 | End: 2017-01-01 | Stop reason: HOSPADM

## 2017-01-01 RX ORDER — SODIUM CHLORIDE 9 MG/ML
25 INJECTION, SOLUTION INTRAVENOUS ONCE
Status: COMPLETED | OUTPATIENT
Start: 2017-01-01 | End: 2017-01-01

## 2017-01-01 RX ORDER — ALBUMIN HUMAN 250 G/1000ML
62.5 SOLUTION INTRAVENOUS ONCE
Status: DISCONTINUED | OUTPATIENT
Start: 2017-01-01 | End: 2017-01-01 | Stop reason: HOSPADM

## 2017-01-01 RX ORDER — INSULIN LISPRO 100 [IU]/ML
INJECTION, SOLUTION INTRAVENOUS; SUBCUTANEOUS
Status: DISCONTINUED | OUTPATIENT
Start: 2017-01-01 | End: 2017-01-01

## 2017-01-01 RX ORDER — ALBUMIN HUMAN 250 G/1000ML
SOLUTION INTRAVENOUS
Status: COMPLETED
Start: 2017-01-01 | End: 2017-01-01

## 2017-01-01 RX ORDER — ALBUMIN HUMAN 50 G/1000ML
25 SOLUTION INTRAVENOUS EVERY 6 HOURS
Status: DISPENSED | OUTPATIENT
Start: 2017-01-01 | End: 2017-01-01

## 2017-01-01 RX ORDER — TRAMADOL HYDROCHLORIDE 50 MG/1
50 TABLET ORAL
Qty: 30 TAB | Refills: 0 | Status: SHIPPED | OUTPATIENT
Start: 2017-01-01

## 2017-01-01 RX ORDER — ALBUMIN HUMAN 250 G/1000ML
25 SOLUTION INTRAVENOUS EVERY 6 HOURS
Status: DISCONTINUED | OUTPATIENT
Start: 2017-01-01 | End: 2017-01-01 | Stop reason: HOSPADM

## 2017-01-01 RX ORDER — SODIUM CHLORIDE 0.9 % (FLUSH) 0.9 %
SYRINGE (ML) INJECTION
Status: DISCONTINUED
Start: 2017-01-01 | End: 2017-01-01 | Stop reason: HOSPADM

## 2017-01-01 RX ORDER — SORAFENIB 200 MG/1
400 TABLET, FILM COATED ORAL 2 TIMES DAILY
Qty: 120 TAB | Refills: 3
Start: 2017-01-01 | End: 2017-01-01

## 2017-01-01 RX ORDER — LEVOFLOXACIN 5 MG/ML
750 INJECTION, SOLUTION INTRAVENOUS
Status: COMPLETED | OUTPATIENT
Start: 2017-01-01 | End: 2017-01-01

## 2017-01-01 RX ORDER — FENTANYL CITRATE 50 UG/ML
200 INJECTION, SOLUTION INTRAMUSCULAR; INTRAVENOUS
Status: DISCONTINUED | OUTPATIENT
Start: 2017-01-01 | End: 2017-01-01 | Stop reason: HOSPADM

## 2017-01-01 RX ORDER — LEVOFLOXACIN 250 MG/1
250 TABLET ORAL DAILY
Qty: 9 TAB | Refills: 0 | Status: SHIPPED | OUTPATIENT
Start: 2017-01-01 | End: 2017-01-01

## 2017-01-01 RX ORDER — MIDAZOLAM HYDROCHLORIDE 1 MG/ML
1 INJECTION, SOLUTION INTRAMUSCULAR; INTRAVENOUS
Status: DISCONTINUED | OUTPATIENT
Start: 2017-01-01 | End: 2017-01-01 | Stop reason: ALTCHOICE

## 2017-01-01 RX ORDER — MIDAZOLAM HYDROCHLORIDE 1 MG/ML
5 INJECTION, SOLUTION INTRAMUSCULAR; INTRAVENOUS
Status: DISCONTINUED | OUTPATIENT
Start: 2017-01-01 | End: 2017-01-01 | Stop reason: HOSPADM

## 2017-01-01 RX ORDER — MIDAZOLAM HYDROCHLORIDE 1 MG/ML
5 INJECTION, SOLUTION INTRAMUSCULAR; INTRAVENOUS
Status: DISCONTINUED | OUTPATIENT
Start: 2017-01-01 | End: 2017-01-01 | Stop reason: ALTCHOICE

## 2017-01-01 RX ORDER — ALBUMIN HUMAN 250 G/1000ML
25 SOLUTION INTRAVENOUS EVERY 6 HOURS
Status: DISCONTINUED | OUTPATIENT
Start: 2017-01-01 | End: 2017-01-01

## 2017-01-01 RX ORDER — BUMETANIDE 1 MG/1
2 TABLET ORAL 2 TIMES DAILY
Qty: 60 TAB | Refills: 1 | Status: SHIPPED
Start: 2017-01-01 | End: 2017-01-01 | Stop reason: DRUGHIGH

## 2017-01-01 RX ORDER — SODIUM BICARBONATE 650 MG/1
325 TABLET ORAL 2 TIMES DAILY
Status: DISCONTINUED | OUTPATIENT
Start: 2017-01-01 | End: 2017-01-01 | Stop reason: HOSPADM

## 2017-01-01 RX ORDER — LEVOTHYROXINE SODIUM 100 UG/1
200 TABLET ORAL
Status: DISCONTINUED | OUTPATIENT
Start: 2017-01-01 | End: 2017-01-01 | Stop reason: HOSPADM

## 2017-01-01 RX ORDER — LORAZEPAM 1 MG/1
1 TABLET ORAL
Status: DISCONTINUED | OUTPATIENT
Start: 2017-01-01 | End: 2017-01-01 | Stop reason: HOSPADM

## 2017-01-01 RX ORDER — DEXTROSE 50 % IN WATER (D50W) INTRAVENOUS SYRINGE
12.5-25 AS NEEDED
Status: DISCONTINUED | OUTPATIENT
Start: 2017-01-01 | End: 2017-01-01 | Stop reason: HOSPADM

## 2017-01-01 RX ORDER — CEFAZOLIN SODIUM IN 0.9 % NACL 2 G/50 ML
INTRAVENOUS SOLUTION, PIGGYBACK (ML) INTRAVENOUS
Status: COMPLETED
Start: 2017-01-01 | End: 2017-01-01

## 2017-01-01 RX ORDER — FENTANYL CITRATE 50 UG/ML
25 INJECTION, SOLUTION INTRAMUSCULAR; INTRAVENOUS
Status: DISCONTINUED | OUTPATIENT
Start: 2017-01-01 | End: 2017-01-01 | Stop reason: ALTCHOICE

## 2017-01-01 RX ORDER — HYDROCODONE BITARTRATE AND ACETAMINOPHEN 5; 325 MG/1; MG/1
1 TABLET ORAL
Qty: 60 TAB | Refills: 0 | Status: SHIPPED | OUTPATIENT
Start: 2017-01-01 | End: 2017-01-01

## 2017-01-01 RX ORDER — TRAMADOL HYDROCHLORIDE 50 MG/1
50 TABLET ORAL
Status: DISCONTINUED | OUTPATIENT
Start: 2017-01-01 | End: 2017-01-01 | Stop reason: HOSPADM

## 2017-01-01 RX ORDER — ACETAMINOPHEN 325 MG/1
650 TABLET ORAL
Status: DISCONTINUED | OUTPATIENT
Start: 2017-01-01 | End: 2017-01-01 | Stop reason: HOSPADM

## 2017-01-01 RX ORDER — LEVOTHYROXINE SODIUM 100 UG/1
400 TABLET ORAL
Status: DISCONTINUED | OUTPATIENT
Start: 2017-01-01 | End: 2017-01-01 | Stop reason: HOSPADM

## 2017-01-01 RX ORDER — LANOLIN ALCOHOL/MO/W.PET/CERES
1000 CREAM (GRAM) TOPICAL DAILY
Status: DISCONTINUED | OUTPATIENT
Start: 2017-01-01 | End: 2017-01-01 | Stop reason: HOSPADM

## 2017-01-01 RX ORDER — SODIUM CHLORIDE 9 MG/ML
75 INJECTION, SOLUTION INTRAVENOUS CONTINUOUS
Status: DISCONTINUED | OUTPATIENT
Start: 2017-01-01 | End: 2017-01-01

## 2017-01-01 RX ORDER — SODIUM CHLORIDE 9 MG/ML
25 INJECTION, SOLUTION INTRAVENOUS CONTINUOUS
Status: DISCONTINUED | OUTPATIENT
Start: 2017-01-01 | End: 2017-01-01 | Stop reason: HOSPADM

## 2017-01-01 RX ORDER — SODIUM CHLORIDE 0.9 % (FLUSH) 0.9 %
5-10 SYRINGE (ML) INJECTION AS NEEDED
Status: DISCONTINUED | OUTPATIENT
Start: 2017-01-01 | End: 2017-01-01 | Stop reason: HOSPADM

## 2017-01-01 RX ORDER — PREDNISONE 20 MG/1
60 TABLET ORAL DAILY
Qty: 12 TAB | Refills: 0 | Status: SHIPPED | OUTPATIENT
Start: 2017-01-01 | End: 2017-01-01

## 2017-01-01 RX ORDER — ALBUMIN HUMAN 250 G/1000ML
50 SOLUTION INTRAVENOUS ONCE
Status: COMPLETED | OUTPATIENT
Start: 2017-01-01 | End: 2017-01-01

## 2017-01-01 RX ORDER — FENTANYL CITRATE 50 UG/ML
200 INJECTION, SOLUTION INTRAMUSCULAR; INTRAVENOUS
Status: DISCONTINUED | OUTPATIENT
Start: 2017-01-01 | End: 2017-01-01 | Stop reason: ALTCHOICE

## 2017-01-01 RX ORDER — INSULIN LISPRO 100 [IU]/ML
INJECTION, SOLUTION INTRAVENOUS; SUBCUTANEOUS
Status: DISCONTINUED | OUTPATIENT
Start: 2017-01-01 | End: 2017-01-01 | Stop reason: HOSPADM

## 2017-01-01 RX ORDER — SODIUM CHLORIDE 0.9 % (FLUSH) 0.9 %
SYRINGE (ML) INJECTION
Status: DISPENSED
Start: 2017-01-01 | End: 2017-01-01

## 2017-01-01 RX ADMIN — LEVOTHYROXINE SODIUM 200 MCG: 100 TABLET ORAL at 07:20

## 2017-01-01 RX ADMIN — ACETAMINOPHEN 650 MG: 325 TABLET ORAL at 06:30

## 2017-01-01 RX ADMIN — ALBUMIN (HUMAN) 25 G: 0.25 INJECTION, SOLUTION INTRAVENOUS at 14:33

## 2017-01-01 RX ADMIN — ALBUMIN (HUMAN) 25 G: 0.25 INJECTION, SOLUTION INTRAVENOUS at 11:37

## 2017-01-01 RX ADMIN — SODIUM CHLORIDE 25 ML/HR: 900 INJECTION, SOLUTION INTRAVENOUS at 10:00

## 2017-01-01 RX ADMIN — CEFAZOLIN 2 G: 1 INJECTION, POWDER, FOR SOLUTION INTRAMUSCULAR; INTRAVENOUS; PARENTERAL at 11:17

## 2017-01-01 RX ADMIN — ALBUMIN (HUMAN) 12.5 G: 0.25 INJECTION, SOLUTION INTRAVENOUS at 13:38

## 2017-01-01 RX ADMIN — ALBUMIN (HUMAN) 12.5 G: 0.25 INJECTION, SOLUTION INTRAVENOUS at 11:27

## 2017-01-01 RX ADMIN — LIDOCAINE HYDROCHLORIDE 10 ML: 20 INJECTION, SOLUTION INFILTRATION; PERINEURAL at 11:29

## 2017-01-01 RX ADMIN — LEVOTHYROXINE SODIUM 200 MCG: 100 TABLET ORAL at 07:23

## 2017-01-01 RX ADMIN — GADOBUTROL 13 ML: 604.72 INJECTION INTRAVENOUS at 09:45

## 2017-01-01 RX ADMIN — FENTANYL CITRATE 25 MCG: 50 INJECTION, SOLUTION INTRAMUSCULAR; INTRAVENOUS at 10:26

## 2017-01-01 RX ADMIN — MIDAZOLAM HYDROCHLORIDE 1 MG: 1 INJECTION, SOLUTION INTRAMUSCULAR; INTRAVENOUS at 11:40

## 2017-01-01 RX ADMIN — SODIUM BICARBONATE 1 ML: 0.2 INJECTION, SOLUTION INTRAVENOUS at 11:57

## 2017-01-01 RX ADMIN — FENTANYL CITRATE 25 MCG: 50 INJECTION, SOLUTION INTRAMUSCULAR; INTRAVENOUS at 11:57

## 2017-01-01 RX ADMIN — INSULIN LISPRO 4 UNITS: 100 INJECTION, SOLUTION INTRAVENOUS; SUBCUTANEOUS at 18:09

## 2017-01-01 RX ADMIN — LEVOTHYROXINE SODIUM 200 MCG: 100 TABLET ORAL at 07:45

## 2017-01-01 RX ADMIN — FENTANYL CITRATE 25 MCG: 50 INJECTION, SOLUTION INTRAMUSCULAR; INTRAVENOUS at 11:37

## 2017-01-01 RX ADMIN — INSULIN LISPRO 7 UNITS: 100 INJECTION, SOLUTION INTRAVENOUS; SUBCUTANEOUS at 17:29

## 2017-01-01 RX ADMIN — Medication 10 ML: at 13:02

## 2017-01-01 RX ADMIN — SODIUM CHLORIDE 25 ML/HR: 900 INJECTION, SOLUTION INTRAVENOUS at 10:40

## 2017-01-01 RX ADMIN — SODIUM BICARBONATE 1 ML: 0.2 INJECTION, SOLUTION INTRAVENOUS at 09:44

## 2017-01-01 RX ADMIN — MIDAZOLAM HYDROCHLORIDE 1 MG: 1 INJECTION, SOLUTION INTRAMUSCULAR; INTRAVENOUS at 11:17

## 2017-01-01 RX ADMIN — FENTANYL CITRATE 25 MCG: 50 INJECTION, SOLUTION INTRAMUSCULAR; INTRAVENOUS at 11:23

## 2017-01-01 RX ADMIN — ALBUMIN (HUMAN) 50 G: 0.25 INJECTION, SOLUTION INTRAVENOUS at 15:37

## 2017-01-01 RX ADMIN — ACETAMINOPHEN 650 MG: 325 TABLET ORAL at 16:37

## 2017-01-01 RX ADMIN — INSULIN LISPRO 50 UNITS: 100 INJECTION, SUSPENSION SUBCUTANEOUS at 18:15

## 2017-01-01 RX ADMIN — ALBUMIN (HUMAN) 25 G: 0.25 INJECTION, SOLUTION INTRAVENOUS at 12:30

## 2017-01-01 RX ADMIN — ALBUMIN (HUMAN) 25 G: 0.25 INJECTION, SOLUTION INTRAVENOUS at 04:09

## 2017-01-01 RX ADMIN — FENTANYL CITRATE 50 MCG: 50 INJECTION, SOLUTION INTRAMUSCULAR; INTRAVENOUS at 11:22

## 2017-01-01 RX ADMIN — INSULIN LISPRO 50 UNITS: 100 INJECTION, SUSPENSION SUBCUTANEOUS at 17:26

## 2017-01-01 RX ADMIN — INSULIN LISPRO 3 UNITS: 100 INJECTION, SOLUTION INTRAVENOUS; SUBCUTANEOUS at 12:31

## 2017-01-01 RX ADMIN — LORAZEPAM 1 MG: 1 TABLET ORAL at 22:17

## 2017-01-01 RX ADMIN — SODIUM BICARBONATE 325 MG: 650 TABLET, ORALLY DISINTEGRATING ORAL at 16:36

## 2017-01-01 RX ADMIN — ACETAMINOPHEN 650 MG: 325 TABLET ORAL at 11:17

## 2017-01-01 RX ADMIN — ALBUMIN (HUMAN) 25 G: 12.5 INJECTION, SOLUTION INTRAVENOUS at 23:13

## 2017-01-01 RX ADMIN — ALBUTEROL SULFATE 1 DOSE: 2.5 SOLUTION RESPIRATORY (INHALATION) at 10:34

## 2017-01-01 RX ADMIN — LORAZEPAM 1 MG: 1 TABLET ORAL at 23:13

## 2017-01-01 RX ADMIN — SODIUM BICARBONATE 2 ML: 0.2 INJECTION, SOLUTION INTRAVENOUS at 12:16

## 2017-01-01 RX ADMIN — SODIUM BICARBONATE 325 MG: 650 TABLET, ORALLY DISINTEGRATING ORAL at 09:52

## 2017-01-01 RX ADMIN — ALBUMIN (HUMAN) 25 G: 0.25 INJECTION, SOLUTION INTRAVENOUS at 05:34

## 2017-01-01 RX ADMIN — LORAZEPAM 1 MG: 1 TABLET ORAL at 22:59

## 2017-01-01 RX ADMIN — ALBUMIN (HUMAN) 25 G: 0.25 INJECTION, SOLUTION INTRAVENOUS at 20:49

## 2017-01-01 RX ADMIN — LIDOCAINE HYDROCHLORIDE 10 ML: 20 INJECTION, SOLUTION INFILTRATION; PERINEURAL at 09:43

## 2017-01-01 RX ADMIN — Medication 10 ML: at 04:56

## 2017-01-01 RX ADMIN — ALBUMIN (HUMAN) 12.5 G: 0.25 INJECTION, SOLUTION INTRAVENOUS at 12:14

## 2017-01-01 RX ADMIN — Medication 10 ML: at 22:59

## 2017-01-01 RX ADMIN — SODIUM CHLORIDE 1000 ML: 900 INJECTION, SOLUTION INTRAVENOUS at 13:00

## 2017-01-01 RX ADMIN — ACETAMINOPHEN 650 MG: 325 TABLET ORAL at 23:17

## 2017-01-01 RX ADMIN — ACETAMINOPHEN 650 MG: 325 TABLET ORAL at 00:54

## 2017-01-01 RX ADMIN — Medication 10 ML: at 13:38

## 2017-01-01 RX ADMIN — CEFAZOLIN 2 G: 1 INJECTION, POWDER, FOR SOLUTION INTRAMUSCULAR; INTRAVENOUS; PARENTERAL at 10:47

## 2017-01-01 RX ADMIN — ALBUMIN (HUMAN) 12.5 G: 0.25 INJECTION, SOLUTION INTRAVENOUS at 11:46

## 2017-01-01 RX ADMIN — LEVOTHYROXINE SODIUM 400 MCG: 100 TABLET ORAL at 06:42

## 2017-01-01 RX ADMIN — MIDAZOLAM HYDROCHLORIDE 2 MG: 1 INJECTION, SOLUTION INTRAMUSCULAR; INTRAVENOUS at 11:16

## 2017-01-01 RX ADMIN — ACETAMINOPHEN 650 MG: 325 TABLET ORAL at 07:16

## 2017-01-01 RX ADMIN — ALBUMIN (HUMAN) 25 G: 0.25 INJECTION, SOLUTION INTRAVENOUS at 09:44

## 2017-01-01 RX ADMIN — ACETAMINOPHEN 650 MG: 325 TABLET ORAL at 16:23

## 2017-01-01 RX ADMIN — ALBUTEROL SULFATE 5 MG: 2.5 SOLUTION RESPIRATORY (INHALATION) at 12:16

## 2017-01-01 RX ADMIN — ALBUMIN (HUMAN) 12.5 G: 0.25 INJECTION, SOLUTION INTRAVENOUS at 12:15

## 2017-01-01 RX ADMIN — MIDAZOLAM HYDROCHLORIDE 0.5 MG: 1 INJECTION, SOLUTION INTRAMUSCULAR; INTRAVENOUS at 09:52

## 2017-01-01 RX ADMIN — SODIUM CHLORIDE 25 ML/HR: 900 INJECTION, SOLUTION INTRAVENOUS at 09:12

## 2017-01-01 RX ADMIN — Medication 10 ML: at 05:35

## 2017-01-01 RX ADMIN — SODIUM BICARBONATE 325 MG: 650 TABLET, ORALLY DISINTEGRATING ORAL at 18:11

## 2017-01-01 RX ADMIN — Medication 10 ML: at 13:01

## 2017-01-01 RX ADMIN — ALBUMIN (HUMAN) 25 G: 0.25 INJECTION, SOLUTION INTRAVENOUS at 18:11

## 2017-01-01 RX ADMIN — METHYLPREDNISOLONE SODIUM SUCCINATE 125 MG: 125 INJECTION, POWDER, FOR SOLUTION INTRAMUSCULAR; INTRAVENOUS at 13:21

## 2017-01-01 RX ADMIN — MIDAZOLAM HYDROCHLORIDE 1 MG: 1 INJECTION, SOLUTION INTRAMUSCULAR; INTRAVENOUS at 11:23

## 2017-01-01 RX ADMIN — ALBUMIN (HUMAN) 12.5 G: 0.25 INJECTION, SOLUTION INTRAVENOUS at 14:01

## 2017-01-01 RX ADMIN — FENTANYL CITRATE 25 MCG: 50 INJECTION, SOLUTION INTRAMUSCULAR; INTRAVENOUS at 09:33

## 2017-01-01 RX ADMIN — IOPAMIDOL 60 ML: 612 INJECTION, SOLUTION INTRAVENOUS at 10:11

## 2017-01-01 RX ADMIN — Medication 1000 MCG: at 09:15

## 2017-01-01 RX ADMIN — ALBUMIN (HUMAN) 25 G: 0.25 INJECTION, SOLUTION INTRAVENOUS at 15:17

## 2017-01-01 RX ADMIN — Medication 10 ML: at 15:37

## 2017-01-01 RX ADMIN — SODIUM CHLORIDE 25 ML/HR: 900 INJECTION, SOLUTION INTRAVENOUS at 10:15

## 2017-01-01 RX ADMIN — ACETAMINOPHEN 650 MG: 325 TABLET ORAL at 17:19

## 2017-01-01 RX ADMIN — INSULIN LISPRO 2 UNITS: 100 INJECTION, SOLUTION INTRAVENOUS; SUBCUTANEOUS at 07:44

## 2017-01-01 RX ADMIN — IOPAMIDOL 25 ML: 612 INJECTION, SOLUTION INTRAVENOUS at 10:12

## 2017-01-01 RX ADMIN — INSULIN LISPRO 50 UNITS: 100 INJECTION, SUSPENSION SUBCUTANEOUS at 08:33

## 2017-01-01 RX ADMIN — INSULIN LISPRO 3 UNITS: 100 INJECTION, SOLUTION INTRAVENOUS; SUBCUTANEOUS at 17:57

## 2017-01-01 RX ADMIN — Medication 1000 MCG: at 09:52

## 2017-01-01 RX ADMIN — ALBUMIN (HUMAN) 12.5 G: 0.25 INJECTION, SOLUTION INTRAVENOUS at 13:25

## 2017-01-01 RX ADMIN — ALBUMIN (HUMAN) 25 G: 12.5 INJECTION, SOLUTION INTRAVENOUS at 16:53

## 2017-01-01 RX ADMIN — Medication 10 ML: at 23:35

## 2017-01-01 RX ADMIN — LIDOCAINE HYDROCHLORIDE 1 ML: 10 INJECTION, SOLUTION INFILTRATION; PERINEURAL at 13:00

## 2017-01-01 RX ADMIN — ALBUMIN (HUMAN) 25 G: 0.25 INJECTION, SOLUTION INTRAVENOUS at 14:27

## 2017-01-01 RX ADMIN — INSULIN LISPRO 3 UNITS: 100 INJECTION, SOLUTION INTRAVENOUS; SUBCUTANEOUS at 22:58

## 2017-01-01 RX ADMIN — ALBUMIN (HUMAN) 25 G: 12.5 INJECTION, SOLUTION INTRAVENOUS at 07:13

## 2017-01-01 RX ADMIN — ALBUMIN (HUMAN) 25 G: 0.25 INJECTION, SOLUTION INTRAVENOUS at 11:16

## 2017-01-01 RX ADMIN — Medication 10 ML: at 22:26

## 2017-01-01 RX ADMIN — SODIUM BICARBONATE 325 MG: 650 TABLET, ORALLY DISINTEGRATING ORAL at 08:51

## 2017-01-01 RX ADMIN — LIDOCAINE HYDROCHLORIDE 15 ML: 20 INJECTION, SOLUTION INFILTRATION; PERINEURAL at 12:16

## 2017-01-01 RX ADMIN — FENTANYL CITRATE 25 MCG: 50 INJECTION, SOLUTION INTRAMUSCULAR; INTRAVENOUS at 09:13

## 2017-01-01 RX ADMIN — INSULIN LISPRO 25 UNITS: 100 INJECTION, SUSPENSION SUBCUTANEOUS at 07:39

## 2017-01-01 RX ADMIN — Medication 1000 MCG: at 09:43

## 2017-01-01 RX ADMIN — GADOBUTROL 10 ML: 604.72 INJECTION INTRAVENOUS at 16:05

## 2017-01-01 RX ADMIN — ACETAMINOPHEN 650 MG: 325 TABLET ORAL at 04:14

## 2017-01-01 RX ADMIN — ALBUMIN (HUMAN) 25 G: 0.25 INJECTION, SOLUTION INTRAVENOUS at 23:34

## 2017-01-01 RX ADMIN — FENTANYL CITRATE 25 MCG: 50 INJECTION, SOLUTION INTRAMUSCULAR; INTRAVENOUS at 11:17

## 2017-01-01 RX ADMIN — MIDAZOLAM HYDROCHLORIDE 1 MG: 1 INJECTION, SOLUTION INTRAMUSCULAR; INTRAVENOUS at 11:57

## 2017-01-01 RX ADMIN — ACETAMINOPHEN 650 MG: 325 TABLET ORAL at 09:51

## 2017-01-01 RX ADMIN — FENTANYL CITRATE 50 MCG: 50 INJECTION, SOLUTION INTRAMUSCULAR; INTRAVENOUS at 11:40

## 2017-01-01 RX ADMIN — SODIUM BICARBONATE 1 ML: 0.2 INJECTION, SOLUTION INTRAVENOUS at 11:29

## 2017-01-01 RX ADMIN — TRAMADOL HYDROCHLORIDE 50 MG: 50 TABLET, FILM COATED ORAL at 14:45

## 2017-01-01 RX ADMIN — INSULIN LISPRO 3 UNITS: 100 INJECTION, SOLUTION INTRAVENOUS; SUBCUTANEOUS at 12:12

## 2017-01-01 RX ADMIN — ALBUMIN HUMAN 12.5 G: 250 SOLUTION INTRAVENOUS at 13:25

## 2017-01-01 RX ADMIN — LEVOFLOXACIN 750 MG: 5 INJECTION, SOLUTION INTRAVENOUS at 13:21

## 2017-01-01 RX ADMIN — MIDAZOLAM HYDROCHLORIDE 1 MG: 1 INJECTION, SOLUTION INTRAMUSCULAR; INTRAVENOUS at 11:37

## 2017-01-01 RX ADMIN — INSULIN LISPRO 50 UNITS: 100 INJECTION, SUSPENSION SUBCUTANEOUS at 07:16

## 2017-01-01 RX ADMIN — Medication 10 ML: at 07:45

## 2017-01-01 RX ADMIN — ACETAMINOPHEN 650 MG: 325 TABLET ORAL at 00:28

## 2017-01-01 RX ADMIN — INSULIN LISPRO 2 UNITS: 100 INJECTION, SOLUTION INTRAVENOUS; SUBCUTANEOUS at 22:00

## 2017-01-01 RX ADMIN — SODIUM BICARBONATE 325 MG: 650 TABLET, ORALLY DISINTEGRATING ORAL at 09:15

## 2017-01-01 RX ADMIN — SODIUM BICARBONATE 325 MG: 650 TABLET, ORALLY DISINTEGRATING ORAL at 20:08

## 2017-01-01 RX ADMIN — SODIUM CHLORIDE 100 ML: 900 INJECTION, SOLUTION INTRAVENOUS at 16:05

## 2017-01-01 RX ADMIN — ALBUTEROL SULFATE 3 PUFF: 90 AEROSOL, METERED RESPIRATORY (INHALATION) at 14:45

## 2017-01-01 RX ADMIN — SODIUM CHLORIDE 75 ML/HR: 900 INJECTION, SOLUTION INTRAVENOUS at 09:48

## 2017-01-01 RX ADMIN — ALBUMIN (HUMAN) 25 G: 12.5 INJECTION, SOLUTION INTRAVENOUS at 22:17

## 2017-01-01 RX ADMIN — ALBUMIN (HUMAN) 25 G: 12.5 INJECTION, SOLUTION INTRAVENOUS at 04:55

## 2017-01-01 RX ADMIN — MIDAZOLAM HYDROCHLORIDE 1 MG: 1 INJECTION, SOLUTION INTRAMUSCULAR; INTRAVENOUS at 09:37

## 2017-01-01 RX ADMIN — Medication 10 ML: at 14:00

## 2017-01-01 RX ADMIN — ALBUMIN (HUMAN) 25 G: 0.25 INJECTION, SOLUTION INTRAVENOUS at 11:36

## 2017-01-01 RX ADMIN — Medication 10 ML: at 23:14

## 2017-01-01 RX ADMIN — Medication 1000 MCG: at 08:51

## 2017-01-01 RX ADMIN — FENTANYL CITRATE 50 MCG: 50 INJECTION, SOLUTION INTRAMUSCULAR; INTRAVENOUS at 11:16

## 2017-01-01 RX ADMIN — ACETAMINOPHEN 650 MG: 325 TABLET ORAL at 20:51

## 2017-01-01 RX ADMIN — SODIUM CHLORIDE 75 ML/HR: 900 INJECTION, SOLUTION INTRAVENOUS at 18:00

## 2017-01-01 RX ADMIN — SODIUM CHLORIDE 1000 ML: 900 INJECTION, SOLUTION INTRAVENOUS at 13:54

## 2017-01-01 RX ADMIN — SODIUM BICARBONATE 325 MG: 650 TABLET, ORALLY DISINTEGRATING ORAL at 17:58

## 2017-01-01 RX ADMIN — INSULIN LISPRO 50 UNITS: 100 INJECTION, SUSPENSION SUBCUTANEOUS at 17:55

## 2017-01-01 RX ADMIN — SODIUM CHLORIDE 500 ML: 900 INJECTION, SOLUTION INTRAVENOUS at 13:22

## 2017-01-01 RX ADMIN — ALBUMIN (HUMAN) 25 G: 0.25 INJECTION, SOLUTION INTRAVENOUS at 15:08

## 2017-01-01 RX ADMIN — LIDOCAINE HYDROCHLORIDE 5 ML: 20 INJECTION, SOLUTION INFILTRATION; PERINEURAL at 11:56

## 2017-01-01 RX ADMIN — Medication 10 ML: at 20:51

## 2017-01-01 RX ADMIN — IOPAMIDOL 40 ML: 612 INJECTION, SOLUTION INTRAVENOUS at 11:58

## 2017-01-01 RX ADMIN — Medication 10 ML: at 05:40

## 2017-01-06 NOTE — PROGRESS NOTES
Luly Sanchez MD, STEPHANIE Bhatti PA-C Durwin Randy, MD, MD Ileana Rock NP Elisa Rose, NP        [address]     [address] [place] [address], [address] Larry Wilkinson  22.     [phone]     FAX: [address] [place] [address]     [facility], [address] [facility], [address]     [phone]     FAX: [phone]         Patient Care Team:  Joaquina Nascimento MD as PCP - General (Family Practice)  Sarina Eisenmenger, MD as Surgeon (General Surgery)      Problem List  Date Reviewed: 12/22/2016          Codes Class Noted    Thrombocytopenia ([facility] ICD-10-CM: D69.6  ICD-9-CM: 287.5  11/10/2016        Cirrhosis ([facility] ICD-10-CM: K74.60  ICD-9-CM: 571.5  10/30/2016        Chronic diastolic heart failure ([facility] ICD-10-CM: I50.32  ICD-9-CM: 428.32  11/9/2011        Acute diastolic heart failure (HCC) ICD-10-CM: I50.31  ICD-9-CM: 428.31  Unknown        Diabetes mellitus, type 2 (HCC) ICD-10-CM: E11.9  ICD-9-CM: 250.00  Unknown        Hyperlipidemia ICD-10-CM: E78.5  ICD-9-CM: 272.4  Unknown        COPD (chronic obstructive pulmonary disease) ([facility] ICD-10-CM: J44.9  ICD-9-CM: 496  Unknown        Colon polyps ICD-10-CM: K63.5  ICD-9-CM: 211.3  Unknown        Hypothyroid ICD-10-CM: E03.9  ICD-9-CM: 244.9  Unknown              [place] Garima [facility] for management of cryptogenic cirrhosis. The active problem list, all pertinent past medical history, medications, endoscopic studies, radiologic findings and laboratory findings related to the liver disorder were reviewed with the patient. The patient is a 78 y.o.  male who was first found to have chronic liver disease and cirrhosis in 10/2016 when he underwent cholecystectomy. The patient has not developed ascites.     There was some mild edema but this has resolved without diuretics. The patient has not developed hepatic encephalopathy. The patient does not have esophageal or gastric varices by EGD in 12/2016. Desiree Mcclelland MRI of the liver was performed in 10/2016. Results suggest a lesion in segment 4 with portal vein thrombosis and mild focal bile duct dilation,  This is concerning but not diagnostic for cholangiocarcinoma. The most recent laboratory studies indicate that the AST is elevated, ALT is normal, ALP is elevated, tests of hepatic synthetic and metabolic function are normal, and the platelet count is depressed. The patient has no symptoms which could be attributed to the liver disorder. The patient completes all daily activities without any functional limitations. The patient has not experienced fatigue, pain in the right side over the liver, problems concentrating, swelling of the abdomen, swelling of the lower extremities, hematemesis, hematochezia. ALLERGIES  No Known Allergies    MEDICATIONS  Current Outpatient Prescriptions   Medication Sig    sucralfate (CARAFATE) 1 gram tablet Take 1 Tab by mouth three (3) times daily.  ASPIRIN PO Take 81 mg by mouth daily.  CHOLECALCIFEROL, VITAMIN D3, (VITAMIN D3 PO) Take 10,000 Units by mouth daily.  insulin aspart protamine/insulin aspart (NOVOLOG MIX 70-30 FLEXPEN) 100 unit/mL (70-30) inpn 10 Units by SubCUTAneous route nightly as needed (if BS >190 takes 10 units).  levothyroxine (SYNTHROID) 200 mcg tablet Take 200 mcg by mouth five (5) days a week. Daily Mon through Fri    potassium 99 mg tablet Take 99 mg by mouth daily.  insulin aspart protamine/insulin aspart (NOVOLOG MIX 70-30 FLEXPEN) 100 unit/mL (70-30) inpn 60 Units by SubCUTAneous route Before breakfast and dinner.  L. RHAMNOSUS GG/INULIN (CULTURELLE PROBIOTICS PO) Take 1 Cap by mouth daily.  levothyroxine (SYNTHROID) 200 mcg tablet Take 400 mcg by mouth two (2) days a week.  Takes 400 mcg Sat, Sun    magnesium oxide 400 mg cap Take 400 mg by mouth daily.  bumetanide (BUMEX) 1 mg tablet Take 2 mg by mouth two (2) times a day.  cyanocobalamin (VITAMIN B-12) 1,000 mcg tablet Take 1,000 mcg by mouth daily.  lorazepam (ATIVAN) 1 mg tablet Take 1 mg by mouth nightly.  benazepril (LOTENSIN) 10 mg tablet Take 10 mg by mouth daily. No current facility-administered medications for this visit. SYSTEM REVIEW NOT RELATED TO LIVER DISEASE OR REVIEWED ABOVE:  Constitution systems: Negative for fever, chills, weight gain, weight loss. Eyes: Negative for visual changes. ENT: Negative for sore throat, painful swallowing. Respiratory: Negative for cough, hemoptysis, SOB. Cardiology: Negative for chest pain, palpitations. GI:  Negative for constipation or diarrhea. : Negative for urinary frequency, dysuria, hematuria, nocturia. Skin: Negative for rash. Hematology: Negative for easy bruising, blood clots. Musculo-skelatal: Negative for back pain, muscle pain, weakness. Neurologic: Diabetic neuropathy. Psychology: Negative for anxiety, depression. FAMILY HISTORY:  The father  of MI. The mother  of brain tumor. There is no family history of liver disease. SOCIAL HISTORY:  The patient is . The patient has 2 children, and 2 grandchildren. The patient stopped using tobacco products in 10/2011. The patient has previously consumed alcohol on rare social occasions never in excess. 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 PHYSICAL EXAMINATION:  Visit Vitals    /53    Pulse 67    Temp 97.2 °F (36.2 °C) (Oral)    Ht 6' 2\" (1.88 m)    Wt 290 lb (131.5 kg)    SpO2 93%    BMI 37.23 kg/m2     General: No acute distress. Eyes: Sclera anicteric. ENT: No oral lesions. Thyroid normal.  Nodes: No adenopathy. Skin: No spider angiomata. No jaundice. No palmar erythema.   Respiratory: Lungs clear to auscultation. Cardiovascular: Regular heart rate. No murmurs. No JVD. Abdomen: Soft non-tender. Liver size normal to percussion/palpation. Spleen not palpable. No obvious ascites. Extremities: No edema. No muscle wasting. No gross arthritic changes. Neurologic: Alert and oriented. Cranial nerves grossly intact. No asterixis. LABORATORY STUDIES:  [facility] of [address] [place] [address] & Units 1/6/2017 11/10/2016   WBC 3.4 - 10.8 x10E3/uL  8.0   ANC 1.4 - 7.0 x10E3/uL  5.6   HGB 12.6 - 17.7 g/dL  12.8    - 379 x10E3/uL  175   INR 0.8 - 1.2  1.1   AST 0 - 40 IU/L 36 61 (H)   ALT 0 - 44 IU/L 27 42   Alk Phos 39 - 117 IU/L 222 (H) 256 (H)   Bili, Total 0.0 - 1.2 mg/dL 0.6 0.6   Bili, Direct 0.00 - 0.40 mg/dL 0.22 0.32   Albumin 3.5 - 4.8 g/dL 3.4 (L) 3.8   BUN 8 - 27 mg/dL 34 (H) 29 (H)   Creat 0.76 - 1.27 mg/dL 1.96 (H) 1.38 (H)   Na 134 - 144 mmol/L 138 138   K 3.5 - 5.2 mmol/L 3.8 5.2   Cl 96 - 106 mmol/L 97 97   CO2 18 - 29 mmol/L 25 26   Glucose 65 - 99 mg/dL 103 (H) 177 (H)   Magnesium 1.6 - 2.4 mg/dL       Cancer Screening Latest Ref Rng & Units 1/6/2017 11/10/2016   AFP, Serum 0.0 - 8.0 ng/mL  58532.9 (H)   AFP-L3% 0.0 - 9.9 %  73.6 (H)   CA 19-9 0 - 35 U/mL 35    CEA 0.0 - 4.7 ng/mL 2.9      SEROLOGIES:  Serologies Latest Ref Rn 11/10/2016   Hep A Ab, Total Negative Negative   Hep B Surface Ag Negative Negative   Hep B Core Ab, Total Negative Negative   Hep B Surface AB QL  Non Reactive   Hep C Ab 0.0 - 0.9 s/co ratio <0.1   Ferritin 30 - 400 ng/mL 97   Iron % Saturation 15 - 55 % 21   LUIS A, IFA  Negative   C-ANCA Neg:<1:20 titer <1:20   P-ANCA Neg:<1:20 titer <1:20   ANCA Neg:<1:20 titer <1:20   ASMCA 0 - 19 Units 11   M2 Ab 0.0 - 20.0 Units 7.0   Alpha-1 antitrypsin level 90 - 200 mg/dL 195     LIVER HISTOLOGY:  Not available or performed    ENDOSCOPIC PROCEDURES:  Not available or performed    RADIOLOGY:  10/2016. Ultrasound of liver. Echogenic consistent with fatty liver.   No liver mass lesions. No dilated bile ducts. No ascites. 10/2016. Dynamic MRI of liver. Changes consistent with cirrhosis. Multiple small enhancing liver masses in left lobe side of segment 4 with focal bile duct dilation portal vein thrombosis. OTHER TESTING:  Not available or performed    ASSESSMENT AND PLAN:  Cryptogenic cirrhosis     MRI findings and marked elevated AFP are highly suspicious for HCC. Will repeat the MRI since it has been 3 months to better assess the vague lesion seen in 10/2016 and refer for Y-90 radioembolizaiton. Have performed laboratory testing to monitor liver function and degree of liver injury. This included BMP, hepatic panel, CBC with platelet count, INR. Liver transaminases are normal.  Alkaline phosphatase is elevated. Liver function is normal.  The platelet count is normal.      The patient has normal liver function. The CTP is 6. Child class A. The MELD score is 12. Serum creatinine has increased to 1.96 mg. He is on no diuretics. Encouraged him to drink more fluids. Hepatic encephalopathy has not developed to date. There is no need for treatment with lactulose and/or Xifaxan at this time. No need to restrict dietary protein at this time. The patient was directed to continue all current medications at the current dosages. There are no contraindications for the patient to take any medications that are necessary for treatment of other medical issues. The patient was counseled regarding alcohol consumption. Thrombocytopenia secondary to cirrhosis. There is no evidence of overt bleeding. There is no indication for platelet transfusions or pharmacologic treatment to increase the platelet count. Bone density to assess for osteoporosis has not been performed. Vaccination for viral hepatitis A and B is recommended since the patient has no serologic evidence of previous exposure or vaccination with immunity.     [facility] will [facility]. AFP was ordered today. Ultrasound will be scheduled      All of the above issues were discussed with the patient. All questions were answered. The patient expressed a clear understanding of the above. [address] in 4 weeks which should be 1 week after radioembolization for [address]     Colby Zaragoza MD  [facility] of [address] [place] [address] [address] [facility] [address]  Shana Bhatia[address], [facility] 22.  [phone]

## 2017-01-17 NOTE — PROGRESS NOTES
This note will not be viewable in [address] Scheduled patient for repeat MRI and and consult with Dr. Juliet Cabral on 1/19. Phone call placed to patient. Wife answered and reported patient had gone to see PCP for a physical. She requested information be relayed to her. Verified HIPAA. Notified of MRI and Dr. Juliet Cabral appointment. Spent 40 minutes on the phone with wife discussing [facility] and answering questions. Scheduled f/u appointment for patient on 2/13/17. Provided wife with NN name and contact information.

## 2017-01-27 NOTE — PROGRESS NOTES
Admission Medication Reconciliation:    Information obtained from: Patient    Significant PMH/Disease States:   Past Medical History   Diagnosis Date    Acute diastolic [facility] failure ([facility]     Arthritis     Cataract     Chronic diastolic heart failure ([facility] 2011    Colon polyps     COPD (chronic obstructive pulmonary disease) (HCC)      no med use    Diabetes mellitus, type 2 ([facility]     ED (erectile dysfunction)     Hyperlipidemia     Hypothyroid     Ill-defined condition      pt states he was given BP med to protect kidneys d/t diabetes    Ill-defined condition 2016     34 pound intention wt loss since early 2016    Liver disease      abnormal MRI    LVH (left ventricular hypertrophy) due to hypertensive disease     Vitamin B 12 deficiency        Chief Complaint for this Admission:  Testicle swelling    Allergies:  Review of patient's allergies indicates no known allergies. Prior to Admission Medications:   Prior to Admission Medications   Prescriptions Last Dose Informant Patient Reported? Taking? CHOLECALCIFEROL, VITAMIN D3, (VITAMIN D3 PO) 2017 at Unknown time  Yes Yes   Sig: Take 10,000 Units by mouth daily. L. RHAMNOSUS GG/INULIN (CULTURELLE PROBIOTICS PO) 2017 at Unknown time  Yes Yes   Sig: Take 1 Cap by mouth daily. aspirin 81 mg chewable tablet 2017  Yes Yes   Sig: Take 81 mg by mouth daily. benazepril (LOTENSIN) 10 mg tablet 2017 at Unknown time  Yes Yes   Sig: Take 10 mg by mouth daily. bumetanide (BUMEX) 1 mg tablet 2017 at Unknown time  Yes Yes   Sig: Take 2 mg by mouth two (2) times a day. cyanocobalamin (VITAMIN B-12) 1,000 mcg tablet 2017 at Unknown time  Yes Yes   Sig: Take 1,000 mcg by mouth daily. insulin aspart protamine/insulin aspart (NOVOLOG MIX 70-30 FLEXPEN) 100 unit/mL (70-30) inpn 2017 at Unknown time  Yes Yes   Si Units by SubCUTAneous route Before breakfast and dinner.    insulin aspart protamine/insulin aspart (NOVOLOG MIX 70-30 FLEXPEN) 100 unit/mL (70-30) inpn   Yes Yes   Sig: 10 Units by SubCUTAneous route nightly as needed (if BS >190 takes 10 units). levothyroxine (SYNTHROID) 200 mcg tablet 1/22/2017  Yes No   Sig: Take 400 mcg by mouth two (2) days a week. Takes 400 mcg Sat, Sun   levothyroxine (SYNTHROID) 200 mcg tablet 1/27/2017 at Unknown time  Yes Yes   Sig: Take 200 mcg by mouth five (5) days a week. Daily Mon through Fri   lorazepam (ATIVAN) 1 mg tablet 1/26/2017 at Unknown time  Yes Yes   Sig: Take 1 mg by mouth nightly.   magnesium oxide 400 mg cap 1/27/2017 at Unknown time  Yes Yes   Sig: Take 400 mg by mouth daily. potassium 99 mg tablet 1/26/2017 at Unknown time  Yes Yes   Sig: Take 99 mg by mouth daily. Facility-Administered Medications: None         Comments/Recommendations: Changed dose of insulin. Confirmed vitamin D is 10,000 units- this is correct. Of note, he takes 200 mcg of levothyroxine 5 days a week (Monday-Friday) and 400 mcg on Saturday and Sunday.     Leela Anna, PharmD, BCPS

## 2017-01-27 NOTE — DISCHARGE INSTRUCTIONS
Chronic Obstructive Pulmonary Disease (COPD): Care Instructions  Your Care Instructions    Chronic obstructive pulmonary disease (COPD) is a general term for a group of lung diseases, including emphysema and chronic bronchitis. People with COPD have decreased airflow in and out of the lungs, which makes it hard to breathe. The airways also can get clogged with thick mucus. Cigarette smoking is a major cause of COPD. Although there is no cure for COPD, you can slow its progress. Following your treatment plan and taking care of yourself can help you feel better and live longer. Follow-up care is a key part of your treatment and safety. Be sure to make and go to all appointments, and call your doctor if you are having problems. It's also a good idea to know your test results and keep a list of the medicines you take. How can you care for yourself at home? Staying healthy  · Do not smoke. This is the most important step you can take to prevent more damage to your lungs. If you need help quitting, talk to your doctor about stop-smoking programs and medicines. These can increase your chances of quitting for good. · Avoid colds and flu. Get a pneumococcal vaccine shot. If you have had one before, ask your doctor whether you need a second dose. Get the flu vaccine every fall. If you must be around people with colds or the flu, wash your hands often. · Avoid secondhand smoke, air pollution, and high altitudes. Also avoid cold, dry air and hot, humid air. Stay at home with your windows closed when air pollution is bad. Medicines and oxygen therapy  · Take your medicines exactly as prescribed. Call your doctor if you think you are having a problem with your medicine. · You may be taking medicines such as:  ¨ Bronchodilators. These help open your airways and make breathing easier. Bronchodilators are either short-acting (work for 6 to 9 hours) or long-acting (work for 24 hours).  You inhale most bronchodilators, so they start to act quickly. Always carry your quick-relief inhaler with you in case you need it while you are away from home. ¨ Corticosteroids (prednisone, budesonide). These reduce airway inflammation. They come in pill or inhaled form. You must take these medicines every day for them to work well. · A spacer may help you get more inhaled medicine to your lungs. Ask your doctor or pharmacist if a spacer is right for you. If it is, ask how to use it properly. · Do not take any vitamins, over-the-counter medicine, or herbal products without talking to your doctor first.  · If your doctor prescribed antibiotics, take them as directed. Do not stop taking them just because you feel better. You need to take the full course of antibiotics. · Oxygen therapy boosts the amount of oxygen in your blood and helps you breathe easier. Use the flow rate your doctor has recommended, and do not change it without talking to your doctor first.  Activity  · Get regular exercise. Walking is an easy way to get exercise. Start out slowly, and walk a little more each day. · Pay attention to your breathing. You are exercising too hard if you cannot talk while you are exercising. · Take short rest breaks when doing household chores and other activities. · Learn breathing methods--such as breathing through pursed lips--to help you become less short of breath. · If your doctor has not set you up with a pulmonary rehabilitation program, talk to him or her about whether rehab is right for you. Rehab includes exercise programs, education about your disease and how to manage it, help with diet and other changes, and emotional support. Diet  · Eat regular, healthy meals. Use bronchodilators about 1 hour before you eat to make it easier to eat. Eat several small meals instead of three large ones. Drink beverages at the end of the meal. Avoid foods that are hard to chew.   · Eat foods that contain protein so that you do not lose muscle mass.  Mental health  · Talk to your family, friends, or a therapist about your feelings. It is normal to feel frightened, angry, hopeless, helpless, and even guilty. Talking openly about bad feelings can help you cope. If these feelings last, talk to your doctor. When should you call for help? Call 911 anytime you think you may need emergency care. For example, call if:  · You have severe trouble breathing. Call your doctor now or seek immediate medical care if:  · You have new or worse trouble breathing. · You cough up blood. · You have a fever. Watch closely for changes in your health, and be sure to contact your doctor if:  · You cough more deeply or more often, especially if you notice more mucus or a change in the color of your mucus. · You have new or worse swelling in your legs or belly. · You are not getting better as expected. Where can you learn more? Go to http://nafisa-caty.info/. Roberto Sanders in the search box to learn more about \"Chronic Obstructive Pulmonary Disease (COPD): Care Instructions. \"  Current as of: May 23, 2016  Content Version: 11.1  © 8493-6247 [employer]. Care instructions adapted under license by [employer] (which disclaims liability or warranty for this information). If you have questions about a medical condition or this instruction, always ask your healthcare professional. [address] any warranty or liability for your use of this information.

## 2017-01-27 NOTE — ED PROVIDER NOTES
HPI Comments: 78 y.o. male with past medical history significant for acute diastolic heart failure, LVH, DM, hyperlipidemia, erectile dysfunction, CHF, COPD, hepatocellular carcinoma, arthritis and cholecystectomy who presents from home with chief complaint of testicle swelling. Patient arrives today complaining of the onset of right testicular and penile swelling yesterday morning. He states the area is not too painful while at rest but is severely painful whenever he moves or to palpation. He denies any trauma or injury. He denies taking any pain medications. Per wife, patient had a temperature of 99.9 and 100 yesterday. Patient additionally reports diagnosis of liver cancer 8 days ago and is scheduled for staging in 3 days. He has not yet received any chemotherapy treatments. He states his abdomen has been extremely swollen for the past few weeks. Wife says he has been notably more dyspneic than usual in the last few days. Patient is currently taking fluid pills. Patient denies chest pain. He says he has not has a breathing treatment \"in a while. \" There are no other acute medical concerns at this time. Social hx: Former tobacco use, quit 5 years ago. PCP: Marjorie Montanez MD    Note written by nelly Burgess, as dictated by Laura Mejia MD 10:16 AM      The history is provided by the patient.         Past Medical History:   Diagnosis Date    Acute diastolic heart failure (HCC)     Arthritis     Cataract     Chronic diastolic heart failure ([address] 11/9/2011    Colon polyps 2002    COPD (chronic obstructive pulmonary disease) (HCC)      no med use    Diabetes mellitus, type 2 ([address] 1995    ED (erectile dysfunction)     Hyperlipidemia     Hypothyroid     Ill-defined condition      pt states he was given BP med to protect kidneys d/t diabetes    Ill-defined condition 12/20/2016     34 pound intention wt loss since early Nov 2016    Liver disease      abnormal MRI    LVH (left ventricular hypertrophy) due to hypertensive disease     Vitamin B 12 deficiency        Past Surgical History:   Procedure Laterality Date    Hx orthopaedic  1976     back surgery - disc    Hx lap cholecystectomy  10/13/2016     by Dr Rooney Grand Hx tonsillectomy      Hx other surgical       1/2 thyroid removed as a teenager/had a ingrown goiter         Family History:   Problem Relation Age of Onset    Heart Disease Father     Heart Attack Father     Cancer Mother      brain tumor    Cancer Sister      kidney       Social History     Social History    Marital status:      Spouse name: N/A    Number of children: N/A    Years of education: N/A     Occupational History    Not on file. Social History Main Topics    Smoking status: Former Smoker     Packs/day: 1.50    Smokeless tobacco: Former User     Quit date: 10/13/2010    Alcohol use No    Drug use: Not on file    Sexual activity: Not on file     Other Topics Concern    Not on file     Social History Narrative         ALLERGIES: Review of patient's allergies indicates no known allergies. Review of Systems   Respiratory: Positive for shortness of breath. Cardiovascular: Positive for leg swelling. Negative for chest pain. Gastrointestinal: Positive for abdominal distention. Genitourinary: Positive for penile swelling, scrotal swelling and testicular pain. All other systems reviewed and are negative. Vitals:    01/27/17 0932   BP: 98/51   Pulse: 68   Resp: 14   Temp: 97.7 °F (36.5 °C)   SpO2: 97%   Weight: 133.8 kg (295 lb)   Height: 6' 2\" (1.88 m)            Physical Exam   Constitutional: He is oriented to person, place, and time. He appears well-developed and well-nourished. HENT:   Head: Normocephalic and atraumatic. Eyes: Conjunctivae and EOM are normal. Pupils are equal, round, and reactive to light. Neck: Neck supple. No JVD present. Cardiovascular: Normal rate, regular rhythm and normal heart sounds. Pulmonary/Chest: Effort normal.   Decreased BS throughout    Abdominal: Soft. There is no tenderness. Hernia confirmed negative in the right inguinal area. Genitourinary: Penis normal. Right testis shows swelling and tenderness. Left testis shows no mass and no tenderness. Lymphadenopathy:        Right: No inguinal adenopathy present. Left: No inguinal adenopathy present. Neurological: He is alert and oriented to person, place, and time. Skin: Skin is warm and dry. Vitals reviewed. MDM  Number of Diagnoses or Management Options  COPD exacerbation ([address]):   Epididymitis:   Diagnosis management comments: Patient noted to be mildly tachynpec on presentation - hx copd, he is not using any bronchodilators for unclear reason - neb    12:35 PM following initial neb now more prominent wheeze although continues to speak in short sentences - neb/solumedrol     After second tx - much improved scant wheeze remain, RR improved satting 95% RA, ambulated without limitation/desaturation    Re testicular pain - US reviewed - levaquin    Patient feels well, appropriate for discharge to home   ED EKG interpretation:  Rhythm: normal sinus rhythm; and regular . Rate (approx.): 60; Axis: normal; P wave: normal; QRS interval: normal ; ST/T wave: non-specific changes; in  Lead: ; Other findings: normal. This EKG was interpreted by JACKELYN Kirkpatrick,ED Provider.       ED Course       Procedures

## 2017-01-27 NOTE — ED TRIAGE NOTES
Triage Note: \"My right testicle is swollen. \"  Pt denies injury. Onset yesterday. Pt also complaining of increased pain.

## 2017-01-30 NOTE — PROGRESS NOTES
Y-90 mapping completed. To nuclear med via stretcher. Right groin DDI. DP pulses easily palp. Denies any discomfort.

## 2017-01-30 NOTE — PROGRESS NOTES
Up at bedside and able to dress self. Right groin DDI. Denies any discomfort. Wife and daughter at bedside. Discharge instructions reviewed with patient and family. Verbalizes understanding of same. To auto via wheelchair.

## 2017-01-30 NOTE — PROGRESS NOTES
HOB elevated. Taking PO fluids and peanut butter and crackers. Denies any discomfort. Discharge instructions reviewed with patient. Verbalizes understanding of procedure. Dr. Da Castaneda in to speak with patient regarding return.   Will return Thursday 2-2-17 at 9 am.

## 2017-01-30 NOTE — PROGRESS NOTES
Patient escorted to angio holding by registration staff. SOB upon arrival.  \"I just cannot walk very far. \"  Wife and daughter with patient. Assessment as noted. DP pulses easily palp bilaterally and marked with black ink. PT pulses easily found bilaterally with doppler and marked with black ink. Dr. Nguyễn Peoples in to speak with patient and wife and obtain consent. Both verbalize understanding of procedure. Consent signed by patient.

## 2017-01-30 NOTE — DISCHARGE INSTRUCTIONS
[facility]  Interventional Radiology/Special Procedures    Y-90 Mapping    Physician:  Dr. Pastor Anne - Interventional Radiologist    Date:  January 30, 2017    A friend or family member must remain with you for the next 24 hours. Increase your fluid intake for the next 24 hours. You may return to work in 24-48 hours, depending on your type of work. Resume your previous diet and follow your medication reconciliation list.    Do not drive a vehicle or sign legal documents for the next 24 hours. You have had an x-ray study of your blood vessels. Some bleeding could occur from the puncture site during the next 48 hours. Limit your walking. Do not engage in any vigorous physical activity for at least 48 hours. If you see any bleeding from the puncture site, apply pressure to the area immediately and ask for assistance. Hold pressure for at least 15 minutes. If the bleeding does not stop or the puncture site becomes swollen, have someone take you to the nearest medical facility immediately. CONTINUE TO HOLD PRESSURE TO THE PUNCTURE SITE. PLEASE NOTIFY Concepcion [facility] [phone]. For any other questions related to your procedure, call [phone], and ask to speak with a nurse. If you have minor leg discomfort, you may take a non-aspirin medication. However if you have SEVERE leg pain, numbness, tingling or change in the color of your leg, call the hospital or your doctor immediately. Leave the dressing in place for the next 3 days. After 3 days, you may remove the dressing and wash the area normally. NO baths, swimming or hot tubs while the dressing remains in place. Showering is permissible. Be sure to follow up with your doctor as soon as possible. Call and make an appointment if you do not already have one. Side effects of sedation medications and other medications used today have been reviewed.   Notify us of nausea, itching, hives, dizziness, or anything else out of the ordinary. Should you experience any of these significant changes, please call [phone] between the hours of 7:30 am and 10 pm or [phone] after hours. After hours, ask the  to page the X-ray Technologist, and describe the problem to the technologist.     Jon Molina Thursday, February 2, 2017 AT 9 AM FOR TREATMENT. Check in at Out-Patient registration. Nothing to eat or drink after midnight on Wednesday, 2-1-17.     Josue Still RN

## 2017-01-30 NOTE — PROGRESS NOTES
Returned from [employer]. Remains flat. Right groin DDI.  VSS. Taking sips of water. Alert and oriented. Wife and daughter aware of completion of procedure and patient status.

## 2017-01-30 NOTE — H&P
Radiology History and Physical    Patient: Jana Verdin 78 y.o. male       Chief Complaint: No chief complaint on file. History of Present Illness: hcc for Y90    History:    Past Medical History   Diagnosis Date    Acute diastolic heart failure (HCC)     Arthritis     Cataract     Chronic diastolic heart failure ([facility] [facility] 11/9/2011    Colon polyps 2002    COPD (chronic obstructive pulmonary disease) (HCC)      no med use    Diabetes mellitus, type 2 ([facility] [address] [facility] 1995    ED (erectile dysfunction)     Hyperlipidemia     Hypothyroid     Ill-defined condition      pt states he was given BP med to protect kidneys d/t diabetes    Ill-defined condition 12/20/2016     34 pound intention wt loss since early Nov 2016    Liver disease      abnormal MRI    LVH (left ventricular hypertrophy) due to hypertensive disease     Vitamin B 12 deficiency      Family History   Problem Relation Age of Onset    Heart Disease Father     Heart Attack Father     Cancer Mother      brain tumor    Cancer Sister      kidney     Social History     Social History    Marital status:      Spouse name: N/A    Number of children: N/A    Years of education: N/A     Occupational History    Not on file. Social History Main Topics    Smoking status: Former Smoker     Packs/day: 1.50    Smokeless tobacco: Former User     Quit date: 10/13/2010    Alcohol use No    Drug use: Not on file    Sexual activity: Not on file     Other Topics Concern    Not on file     Social History Narrative       Allergies:    Allergies   Allergen Reactions    Lyrica [Pregabalin] Hives       Current Medications:  Current Facility-Administered Medications   Medication Dose Route Frequency    lidocaine (XYLOCAINE) 20 mg/mL (2 %) injection 400 mg  20 mL SubCUTAneous RAD ONCE    sodium bicarbonate (NEUT) injection 5 mL  5 mL SubCUTAneous RAD ONCE    iopamidol (ISOVUE 300) 61 % contrast injection 150 mL  150 mL IntraVENous RAD ONCE    iopamidol (ISOVUE 300) 61 % contrast injection 100 mL  100 mL IntraVENous RAD ONCE    midazolam (VERSED) injection 1 mg  1 mg IntraVENous RAD PRN    fentaNYL citrate (PF) injection 25 mcg  25 mcg IntraVENous RAD PRN    0.9% sodium chloride infusion  25 mL/hr IntraVENous ONCE    ceFAZolin (ANCEF) 3 g in 0.9%  ml IVPB  3 g IntraVENous ONCE        Physical Exam:  Blood pressure 156/52, pulse (!) 58, temperature 97.8 °F (36.6 °C), resp. rate 15, height 6' 2\" (1.88 m), weight 131.1 kg (289 lb), SpO2 97 %. LUNG: clear to auscultation bilaterally, HEART: regular rate and rhythm      Alerts:    Hospital Problems  Date Reviewed: 1/7/2017    None          Laboratory:      Recent Labs      01/27/17   1024   HGB  9.9*   HCT  29.7*   WBC  14.2*   PLT  175   BUN  49*   CREA  2.06*   K  3.5         Plan of Care/Planned Procedure:  Risks, benefits, and alternatives reviewed with patient and he agrees to proceed with the procedure.        Pascual Keating MD

## 2017-02-02 NOTE — H&P
Radiology History and Physical    Patient: Paul Sosa 78 y.o. male       Chief Complaint: No chief complaint on file. History of Present Illness: hcc for Y90    History:    Past Medical History   Diagnosis Date    Acute diastolic heart failure (HCC)     Arthritis     Cataract     Chronic diastolic heart failure ([facility] 11/9/2011    Colon polyps 2002    COPD (chronic obstructive pulmonary disease) (HCC)      no med use    Diabetes mellitus, type 2 ([facility] [facility] 1995    ED (erectile dysfunction)     Hyperlipidemia     Hypothyroid     Ill-defined condition      pt states he was given BP med to protect kidneys d/t diabetes    Ill-defined condition 12/20/2016     34 pound intention wt loss since early Nov 2016    Liver disease      abnormal MRI    LVH (left ventricular hypertrophy) due to hypertensive disease     Vitamin B 12 deficiency      Family History   Problem Relation Age of Onset    Heart Disease Father     Heart Attack Father     Cancer Mother      brain tumor    Cancer Sister      kidney     Social History     Social History    Marital status:      Spouse name: N/A    Number of children: N/A    Years of education: N/A     Occupational History    Not on file. Social History Main Topics    Smoking status: Former Smoker     Packs/day: 1.50    Smokeless tobacco: Former User     Quit date: 10/13/2010    Alcohol use No    Drug use: Not on file    Sexual activity: Not on file     Other Topics Concern    Not on file     Social History Narrative       Allergies:    Allergies   Allergen Reactions    Lyrica [Pregabalin] Hives       Current Medications:  Current Facility-Administered Medications   Medication Dose Route Frequency    0.9% sodium chloride infusion  25 mL/hr IntraVENous CONTINUOUS    midazolam (VERSED) injection 5 mg  5 mg IntraVENous Rad Multiple    fentaNYL citrate (PF) injection 200 mcg  200 mcg IntraVENous Multiple    albumin human 25% (BUMINATE) solution 25 g 25 g IntraVENous Multiple     Current Outpatient Prescriptions   Medication Sig    levoFLOXacin (LEVAQUIN) 250 mg tablet Take 1 Tab by mouth daily for 9 days.  CHOLECALCIFEROL, VITAMIN D3, (VITAMIN D3 PO) Take 10,000 Units by mouth daily.  potassium 99 mg tablet Take 99 mg by mouth daily.  insulin aspart protamine/insulin aspart (NOVOLOG MIX 70-30 FLEXPEN) 100 unit/mL (70-30) inpn 50 Units by SubCUTAneous route Before breakfast and dinner.  L. RHAMNOSUS GG/INULIN (CULTURELLE PROBIOTICS PO) Take 1 Cap by mouth daily.  levothyroxine (SYNTHROID) 200 mcg tablet Take 400 mcg by mouth two (2) days a week. Takes 400 mcg Sat, Sun    magnesium oxide 400 mg cap Take 400 mg by mouth daily.  benazepril (LOTENSIN) 10 mg tablet Take 10 mg by mouth daily.  bumetanide (BUMEX) 1 mg tablet Take 2 mg by mouth two (2) times a day.  cyanocobalamin (VITAMIN B-12) 1,000 mcg tablet Take 1,000 mcg by mouth daily.  lorazepam (ATIVAN) 1 mg tablet Take 1 mg by mouth nightly.  aspirin 81 mg chewable tablet Take 81 mg by mouth daily.  insulin aspart protamine/insulin aspart (NOVOLOG MIX 70-30 FLEXPEN) 100 unit/mL (70-30) inpn 10 Units by SubCUTAneous route nightly as needed (if BS >190 takes 10 units).  levothyroxine (SYNTHROID) 200 mcg tablet Take 200 mcg by mouth five (5) days a week. Daily Mon through Fri        Physical Exam:  Blood pressure 146/58, pulse 61, temperature 97.4 °F (36.3 °C), resp. rate 18, height 6' 2\" (1.88 m), weight 131.5 kg (290 lb), SpO2 96 %. LUNG: clear to auscultation bilaterally, HEART: regular rate and rhythm      Alerts:    Hospital Problems  Date Reviewed: 1/7/2017    None          Laboratory:    No results for input(s): HGB, HCT, WBC, PLT, INR, BUN, CREA, K, CRCLT, HGBEXT, HCTEXT, PLTEXT in the last 72 hours.     No lab exists for component: PTT, PT, INREXT      Plan of Care/Planned Procedure:  Risks, benefits, and alternatives reviewed with patient and he agrees to proceed with the procedure.        Nick Gibson MD

## 2017-02-02 NOTE — DISCHARGE INSTRUCTIONS
Angiogram: What to Expect at Home  Your Recovery  An angiogram is an X-ray test that uses dye and a camera to take pictures of the blood flow in an artery or a vein. The doctor inserted a thin, flexible tube (catheter) into a blood vessel in your groin. In some cases, the catheter is placed in a blood vessel in the arm. An angiogram is done for many reasons. For example, you may have an angiogram to find the source of bleeding, such as an ulcer. Or it may be done to look for blocked blood vessels in your lungs. After an angiogram, your groin or arm may have a bruise and feel sore for a day or two. You can do light activities around the house but nothing strenuous for several days. Your doctor may give you specific instructions on when you can do your normal activities again, such as driving and going back to work. This care sheet gives you a general idea about how long it will take for you to recover. But each person recovers at a different pace. Follow the steps below to feel better as quickly as possible. How can you care for yourself at home? Activity  · Do not do strenuous exercise and do not lift, pull, or push anything heavy until your doctor says it is okay. This may be for a day or two. You can walk around the house and do light activity, such as cooking. · You may shower 24 to 48 hours after the procedure, if your doctor okays it. Pat the incision dry. Do not take a bath for 1 week, or until your doctor tells you it is okay. · If the catheter was placed in your groin, try not to walk up stairs for the first couple of days. · If the catheter was placed in your arm near your wrist, do not bend your wrist deeply for the first couple of days. Be careful using your hand to get into and out of a chair or bed. · If your doctor recommends it, get more exercise. Walking is a good choice. Bit by bit, increase the amount you walk every day.  Try for at least 30 minutes on most days of the week.  Diet  · Drink plenty of fluids to help your body flush out the dye. If you have kidney, heart, or liver disease and have to limit fluids, talk with your doctor before you increase the amount of fluids you drink. · You can eat your normal diet. If your stomach is upset, try bland, low-fat foods like plain rice, broiled chicken, toast, and yogurt. Medicines  · Be safe with medicines. Read and follow all instructions on the label. ¨ If the doctor gave you a prescription medicine for pain, take it as prescribed. ¨ If you are not taking a prescription pain medicine, ask your doctor if you can take an over-the-counter medicine. · If you take blood thinners, such as warfarin (Coumadin), clopidogrel (Plavix), or aspirin, be sure to talk to your doctor. He or she will tell you if and when to start taking those medicines again. Make sure that you understand exactly what your doctor wants you to do. · Your doctor will tell you if and when you can restart your medicines. He or she will also give you instructions about taking any new medicines. Care of the catheter site  · You will have a dressing over the cut (incision). A dressing helps the incision heal and protects it. Your doctor will tell you how to take care of this. · Put ice or a cold pack on the area for 10 to 20 minutes at a time to help with soreness or swelling. Put a thin cloth between the ice and your skin. Follow-up care is a key part of your treatment and safety. Be sure to make and go to all appointments, and call your doctor if you are having problems. It's also a good idea to know your test results and keep a list of the medicines you take. When should you call for help? Call 911 anytime you think you may need emergency care. For example, call if:  · You passed out (lost consciousness). · You have severe trouble breathing. · You have sudden chest pain and shortness of breath, or you cough up blood.   Call your doctor now or seek immediate medical care if:  · You are bleeding from the area where the catheter was put in your artery. · You have a fast-growing, painful lump at the catheter site. · You have signs of infection, such as:  ¨ Increased pain, swelling, warmth, or redness. ¨ Red streaks leading from the incision. ¨ Pus draining from the incision. ¨ A fever. Watch closely for any changes in your health, and be sure to contact your doctor if:  · You don't get better as expected. · You received light sedation therefore no driving or making any critical decisions for 24 hours. · You may experience tiredness for about a week after this procedure. · Dr. Devin Kirkpatrick communicated you will see him in his office next week and he will draw lab work and assess the need to drain fluid off your abdomen. Dr. Devin Kirkpatrick also communicated should fluid accumulate  Prior to next week on abdomen you may have to return to Emergency room for possible drainage of fluids. But to please return to [facility]. · Angio Department can be reached at [phone] 0730-10 pm and ask for Angio nurse, or [phone] after 10 pm and ask for on call Angio staff  Where can you learn more? Go to http://nafisa-caty.info/. Enter F288 in the search box to learn more about \"Angiogram: What to Expect at Home. \"  Current as of: February 19, 2016  Content Version: 11.1  © 8883-6097 [employer]. Care instructions adapted under license by [employer] (which disclaims liability or warranty for this information). If you have questions about a medical condition or this instruction, always ask your healthcare professional. [address] any warranty or liability for your use of this information.

## 2017-02-03 NOTE — PROGRESS NOTES
Spoke with Dr. Carolin Wilkins and reported patients paracentesis site continuing to drain clear fluid. Patient taking tylenol for discomfort at insertion site. Dr. Carolin Wilkins advised to have patient lay on side with paracentesis site up. Called patient and reported patient verbalized understanding.

## 2017-02-06 PROBLEM — C22.0 HEPATOCELLULAR CARCINOMA (HCC): Status: ACTIVE | Noted: 2017-01-01

## 2017-02-06 NOTE — MR AVS SNAPSHOT
Visit Information Date & Time Provider Department Dept. Phone Encounter #  
 2/6/2017  9:15 AM Bonny Dial MD [facility] [id number] Your Appointments 2/13/2017  9:15 AM  
Follow Up with Bonny Dial MD  
[facility] [address] ([facility]) Appt Note: [address] [phone] [facility] [address]  
[phone]  
  
   
 200 [facility] Yusuf 505 [facility] [address]  
  
    
 4/6/2017  9:30 AM  
Follow Up with Bonny Dial MD  
[facility] [address] ([facility]) Appt Note: Follow Up  
 200 [facility] [phone] [address] [place] [address]  
[phone] Upcoming Health Maintenance Date Due HEMOGLOBIN A1C Q6M 1940 LIPID PANEL Q1 1942 FOOT EXAM Q1 12/30/1952 EYE EXAM RETINAL OR DILATED Q1 12/30/1952 DTaP/Tdap/Td series (1 - Tdap) 12/30/1963 FOBT Q 1 YEAR AGE 50-75 12/30/1992 ZOSTER VACCINE AGE 60> 12/30/2002 GLAUCOMA SCREENING Q2Y 12/30/2007 Pneumococcal 65+ High/Highest Risk (1 of 2 - PCV13) 12/30/2007 MEDICARE YEARLY EXAM 12/30/2007 INFLUENZA AGE 9 TO ADULT 8/1/2016 MICROALBUMIN Q1 1/18/2018 Allergies as of 2/6/2017  Review Complete On: 2/6/2017 By: Nancy Roger LPN Severity Noted Reaction Type Reactions Lyrica [Pregabalin]  01/30/2017    Hives Current Immunizations  Never Reviewed No immunizations on file. Not reviewed this visit You Were Diagnosed With   
  
 Codes Comments Hepatocellular carcinoma ([facility]    -  Primary ICD-10-CM: C22.0 ICD-9-CM: 155.0 Vitals BP Pulse Temp Height(growth percentile) Weight(growth percentile) BMI  
 99/54 69 96.6 °F (35.9 °C) (Oral) 6' 2\" (1.88 m) 275 lb (124.7 kg) 35.31 kg/m2 Smoking Status Former Smoker BMI and BSA Data Body Mass Index Body Surface Area  
 35.31 kg/m 2 2.55 m 2 Preferred Pharmacy Pharmacy Name Phone [id number], [facility] [place] [phone] Your Updated Medication List  
  
   
This list is accurate as of: 2/6/17 10:34 AM.  Always use your most recent med list.  
  
  
  
  
 aspirin 81 mg chewable tablet Take 81 mg by mouth daily. benazepril 10 mg tablet Commonly known as:  LOTENSIN Take 10 mg by mouth daily. bumetanide 1 mg tablet Commonly known as:  [place] Take 2 mg by mouth two (2) times a day. CULTURELLE PROBIOTICS PO Take 1 Cap by mouth daily. * levothyroxine 200 mcg tablet Commonly known as:  SYNTHROID Take 400 mcg by mouth two (2) days a week. Takes 400 mcg Sat, Sun  
  
 * levothyroxine 200 mcg tablet Commonly known as:  SYNTHROID Take 200 mcg by mouth five (5) days a week. Daily Mon through Fri LORazepam 1 mg tablet Commonly known as:  ATIVAN Take 1 mg by mouth nightly.  
  
 magnesium oxide 400 mg Cap Take 400 mg by mouth daily. * NovoLOG Mix 70-30 FlexPen 100 unit/mL (70-30) Inpn Generic drug:  insulin aspart protamine/insulin aspart 50 Units by SubCUTAneous route Before breakfast and dinner. * NovoLOG Mix 70-30 FlexPen 100 unit/mL (70-30) Inpn Generic drug:  insulin aspart protamine/insulin aspart 10 Units by SubCUTAneous route nightly as needed (if BS >190 takes 10 units). potassium 99 mg tablet Take 99 mg by mouth daily. VITAMIN B-12 1,000 mcg tablet Generic drug:  cyanocobalamin Take 1,000 mcg by mouth daily. VITAMIN D3 PO Take 10,000 Units by mouth daily. * Notice: This list has 4 medication(s) that are the same as other medications prescribed for you. Read the directions carefully, and ask your doctor or other care provider to review them with you. We Performed the Following CBC W/O DIFF [97483 CPT(R)] HEPATIC FUNCTION PANEL [35799 CPT(R)] METABOLIC PANEL, BASIC [09445 CPT(R)] PROTHROMBIN TIME + INR [80251 CPT(R)] To-Do List   
 02/06/2017 Imaging:  MRI ABD W WO CONT   
  
 03/07/2017 9:15 AM  
  Appointment with [facility] MRI 2 at [facility] MRI Department ([phone]) **NPO Nothing to eat or drink 4-6 hours prior to your exam.**  1. Please bring a list or a bag of your current medications to your appointment 2. Please be sure to remove ALL hair clips, pins, extensions, etc., prior to arriving for your MRI procedure. 3. Bring any non [facility] films or CDs pertaining to the area being imaged with you on the day of appointment. 4. A written order with a valid diagnosis and Physicians signature is required for all scheduled tests. 5. Check in at registration 30min before your appointment time unless you were instructed to do otherwise. Introducing [facility]! Dear Lorin Ross: Thank you for requesting a [employer] account. Our records indicate that you already have an active [employer] account. You can access your account anytime at [email or web address] Did you know that you can access your hospital and ER discharge instructions at any time in [employer]? You can also review all of your test results from your hospital stay or ER visit. Additional Information If you have questions, please visit the Frequently Asked Questions section of the [employer] website at [email or web address] Remember, [employer] is NOT to be used for urgent needs. For medical emergencies, dial 911. Now available from your iPhone and Android! Please provide this summary of care documentation to your next provider. Your primary care clinician is listed as Love Victor. If you have any questions after today's visit, please call [phone].

## 2017-02-06 NOTE — PROGRESS NOTES
Luly Coronel MD, Gilman Daft, NP Hall Moritz, PA-C Virl Catalan, MD, MD Ileana Gaffney NP Gaylia Lyons, NP        [address], [address] [facility], [address]     [phone]     FAX: 411 [address], [address] [place] [address], [address]     [phone]     FAX: [phone]         Patient Care Team:  Lizeth Núñez MD as PCP - General (Family Practice)  Herminia Chaudhry MD as Surgeon (General Surgery)  Sweta Salinas RN as Nurse Navigator      Problem List  Date Reviewed: 2/6/2017          Codes Class Noted    Hepatocellular carcinoma ([facility] ICD-10-CM: C22.0  ICD-9-CM: 155.0  2/6/2017        Thrombocytopenia ([facility] ICD-10-CM: D69.6  ICD-9-CM: 287.5  11/10/2016        Cirrhosis ([facility] ICD-10-CM: K74.60  ICD-9-CM: 571.5  10/30/2016        Chronic diastolic heart failure ([facility] ICD-10-CM: I50.32  ICD-9-CM: 428.32  11/9/2011        Acute diastolic heart failure (HCC) ICD-10-CM: I50.31  ICD-9-CM: 428.31  Unknown        Diabetes mellitus, type 2 (HCC) ICD-10-CM: E11.9  ICD-9-CM: 250.00  Unknown        Hyperlipidemia ICD-10-CM: E78.5  ICD-9-CM: 272.4  Unknown        COPD (chronic obstructive pulmonary disease) ([facility] ICD-10-CM: J44.9  ICD-9-CM: 496  Unknown        Colon polyps ICD-10-CM: K63.5  ICD-9-CM: 211.3  Unknown        Hypothyroid ICD-10-CM: E03.9  ICD-9-CM: 244.9  Unknown              Gutierrez Her [facility] for management of cryptogenic cirrhosis and Hepatocellular carcinoma. The active problem list, all pertinent past medical history, medications, endoscopic studies, radiologic findings and laboratory findings related to the liver disorder were reviewed with the patient. The patient is a 78 y.o.   male who was found to have chronic liver disease and cirrhosis in 10/2016 when he underwent cholecystectomy. He was found to have hepatocellular carcinoma in 10/2016. MRI in 10/43770 demonstrated several liver mass lesions in segments 3, 6 and 7. An AFP in 11/2016 was 12,000. HCC was treated with Y-90 radioembolization/TARE to the largest mass in the left right lobe in 1/2016. The patient developed ascites for the first time in 1/2016. He underwent large volume paracentesis. He has had ascites leak from the puncture site. There was some mild edema but this has resolved without diuretics. The patient has not developed hepatic encephalopathy. The patient does not have esophageal or gastric varices by EGD in 12/2016. .     The patient has developed worsening fatigue, swelling of the abdomen. The patient completes all daily activities without any functional limitations. The patient has not experienced fatigue, pain in the right side over the liver, problems concentrating, swelling of the abdomen, swelling of the lower extremities, hematemesis, hematochezia. ALLERGIES  Allergies   Allergen Reactions    Lyrica [Pregabalin] Hives       MEDICATIONS  Current Outpatient Prescriptions   Medication Sig    SORAfenib (NEXAVAR) 200 mg tablet Take 2 Tabs by mouth two (2) times a day. Indications: LIVER CELL CARCINOMA    aspirin 81 mg chewable tablet Take 81 mg by mouth daily.  CHOLECALCIFEROL, VITAMIN D3, (VITAMIN D3 PO) Take 10,000 Units by mouth daily.  insulin aspart protamine/insulin aspart (NOVOLOG MIX 70-30 FLEXPEN) 100 unit/mL (70-30) inpn 10 Units by SubCUTAneous route nightly as needed (if BS >190 takes 10 units).  levothyroxine (SYNTHROID) 200 mcg tablet Take 200 mcg by mouth five (5) days a week. Daily Mon through Fri    potassium 99 mg tablet Take 99 mg by mouth daily.     insulin aspart protamine/insulin aspart (NOVOLOG MIX 70-30 FLEXPEN) 100 unit/mL (70-30) inpn 50 Units by SubCUTAneous route Before breakfast and dinner.  L. RHAMNOSUS GG/INULIN (CULTURELLE PROBIOTICS PO) Take 1 Cap by mouth daily.  levothyroxine (SYNTHROID) 200 mcg tablet Take 400 mcg by mouth two (2) days a week. Takes 400 mcg Sat, Sun    magnesium oxide 400 mg cap Take 400 mg by mouth daily.  benazepril (LOTENSIN) 10 mg tablet Take 10 mg by mouth daily.  bumetanide (BUMEX) 1 mg tablet Take 2 mg by mouth two (2) times a day.  cyanocobalamin (VITAMIN B-12) 1,000 mcg tablet Take 1,000 mcg by mouth daily.  lorazepam (ATIVAN) 1 mg tablet Take 1 mg by mouth nightly. No current facility-administered medications for this visit. SYSTEM REVIEW NOT RELATED TO LIVER DISEASE OR REVIEWED ABOVE:  Constitution systems: Negative for fever, chills, weight gain, weight loss. Eyes: Negative for visual changes. ENT: Negative for sore throat, painful swallowing. Respiratory: Negative for cough, hemoptysis, SOB. Cardiology: Negative for chest pain, palpitations. GI:  Negative for constipation or diarrhea. : Negative for urinary frequency, dysuria, hematuria, nocturia. Skin: Negative for rash. Hematology: Negative for easy bruising, blood clots. Musculo-skelatal: Negative for back pain, muscle pain, weakness. Neurologic: Diabetic neuropathy. Psychology: Negative for anxiety, depression. FAMILY HISTORY:  The father  of MI. The mother  of brain tumor. There is no family history of liver disease. SOCIAL HISTORY:  The patient is . The patient has 2 children, and 2 grandchildren. The patient stopped using tobacco products in 10/2011. The patient has previously consumed alcohol on rare social occasions never in excess. 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         PHYSICAL EXAMINATION:  Visit Vitals    BP 99/54    Pulse 69    Temp 96.6 °F (35.9 °C) (Oral)    Ht 6' 2\" (1.88 m)    Wt 275 lb (124.7 kg)    BMI 35.31 kg/m2     General: No acute distress. Eyes: Sclera anicteric. ENT: No oral lesions. Thyroid normal.  Nodes: No adenopathy. Skin: No spider angiomata. No jaundice. No palmar erythema. Respiratory: Lungs clear to auscultation. Cardiovascular: Regular heart rate. No murmurs. No JVD. Abdomen: Soft non-tender. Liver size normal to percussion/palpation. Spleen not palpable. No obvious ascites. Extremities: No edema. No muscle wasting. No gross arthritic changes. Neurologic: Alert and oriented. Cranial nerves grossly intact. No asterixis.     LABORATORY STUDIES:  [facility] [place] of [address] [place] [address] & Units 1/27/2017 1/6/2017   WBC 4.1 - 11.1 K/uL 14.2 (H)    ANC 1.8 - 8.0 K/UL 11.9 (H)    HGB 12.1 - 17.0 g/dL 9.9 (L)     - 400 K/uL 175    INR 0.8 - 1.2     AST 15 - 37 U/L 50 (H) 36   ALT 12 - 78 U/L 37 27   Alk Phos 45 - 117 U/L 290 (H) 222 (H)   Bili, Total 0.2 - 1.0 MG/DL 1.1 (H) 0.6   Bili, Direct 0.00 - 0.40 mg/dL  0.22   Albumin 3.5 - 5.0 g/dL 2.6 (L) 3.4 (L)   BUN 6 - 20 MG/DL 49 (H) 34 (H)   Creat 0.70 - 1.30 MG/DL 2.06 (H) 1.96 (H)   Na 136 - 145 mmol/L 134 (L) 138   K 3.5 - 5.1 mmol/L 3.5 3.8   Cl 97 - 108 mmol/L 101 97   CO2 21 - 32 mmol/L 23 25   Glucose 65 - 100 mg/dL 212 (H) 103 (H)   Magnesium 1.6 - 2.4 mg/dL       Cancer Screening Latest Ref Rng & Units 1/6/2017 11/10/2016   AFP, Serum 0.0 - 8.0 ng/mL  36038.9 (H)   AFP-L3% 0.0 - 9.9 %  73.6 (H)   CA 19-9 0 - 35 U/mL 35    CEA 0.0 - 4.7 ng/mL 2.9      SEROLOGIES:  Serologies Latest Ref Rng 11/10/2016   Hep A Ab, Total Negative Negative   Hep B Surface Ag Negative Negative   Hep B Core Ab, Total Negative Negative   Hep B Surface AB QL  Non Reactive   Hep C Ab 0.0 - 0.9 s/co ratio <0.1   Ferritin 30 - 400 ng/mL 97   Iron % Saturation 15 - 55 % 21   LUIS A, IFA  Negative   C-ANCA Neg:<1:20 titer <1:20   P-ANCA Neg:<1:20 titer <1:20   ANCA Neg:<1:20 titer <1:20   ASMCA 0 - 19 Units 11   M2 Ab 0.0 - 20.0 Units 7.0   Alpha-1 antitrypsin level 90 - 200 mg/dL 195     LIVER HISTOLOGY:  Not available or performed    ENDOSCOPIC PROCEDURES:  Not available or performed    RADIOLOGY:  10/2016. Ultrasound of liver. Echogenic consistent with fatty liver. No liver mass lesions. No dilated bile ducts. No ascites. 10/2016. Dynamic MRI of liver. Changes consistent with cirrhosis. Multiple small enhancing liver masses in left lobe side of segment 4 with focal bile duct dilation portal vein thrombosis. 1/2017. Dynamic MRI liver. Changes consistent with cirrhosis. Multiple focal arterial phase enhancing lesions are redemonstrated with 2 lesions in segment 2 measuring 9 x 11 and 7 x 10 mm (series 8, image 17), a lesion in segment 4 measuring 12 x 19 mm of (8, 23), a lesion in segment 8 measuring 17 x 18 mm of (8, 22), a lesion in segment 7 measuring 4 mm (8, 22), and 8 x 10 mm lesion in segment 6 (8,40), There portal vein invasion into segment 4A portal vein branch (8, 113). There is large ascites. [address] TREATMENT:  1/2016. TARE to left lobe. ASSESSMENT AND PLAN:  Cryptogenic cirrhosis. 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 The patient, his wife and daughter were again made aware of the extend of [address] and that this was not curable but could be treated for palliation and prolongation in life. He has tolerated TARE to the left lobe well. Will proceed with TARE to the right lobe as long s liver function remains stable. Will start Nexavar 400 mg BID. Side effects of Nexavar including hand and foot syndrome and GI symptoms were discussed. Will repeat MRI to assess impact of [address] treatment 1 month after TARE. If he gets a second TARE will reimage 1 month after that treatment. MRI findings and marked elevated AFP are highly suspicious for HCC. Will repeat the MRI since it has been 3 months to better assess the vague lesion seen in 10/2016 and refer for Y-90 radioembolizaiton.     Ascites has developed since the last office visit. AScites was treated with large volume paracentesis at the time of TARE. He has had leak of ascites from the puncture site. It was suggested that he lay with his left side down with puncture site up to reduce leak and allow tract to heal.  He has CKD and will probably not be able to increase the dose of diuretics. Will perform laboratory testing to monitor liver function and degree of liver injury. This included BMP, hepatic panel, CBC with platelet count, INR. Will repeat AFP. The patient has depressed but stable liver function. The CTP is 8. Child class B. The MELD score is 20. Serum creatinine has increased to 2.096 mg. He is on no diuretics. Hepatic encephalopathy has not developed to date. There is no need for treatment with lactulose and/or Xifaxan at this time. No need to restrict dietary protein at this time. The patient was directed to continue all current medications at the current dosages. There are no contraindications for the patient to take any medications that are necessary for treatment of other medical issues. The patient was counseled regarding alcohol consumption. Thrombocytopenia secondary to cirrhosis. There is no evidence of overt bleeding. There is no indication for platelet transfusions or pharmacologic treatment to increase the platelet count. Bone density to assess for osteoporosis has not been performed. Vaccination for viral hepatitis A and B is recommended since the patient has no serologic evidence of previous exposure or vaccination with immunity. All of the above issues were discussed with the patient. All questions were answered. The patient expressed a clear understanding of the above. [facility] 87 in 4 weeks.       Colby Zaragoza MD  [facility] [place] of [address] [place] [address] [address]98 [address]tramaineThe [facility].  [phone]

## 2017-02-07 NOTE — PROGRESS NOTES
This note will not be viewable in [address] Received phone call from patient's wife inquiring if LE cramping is related to Y-90. Wife notified these are likely unrelated. Patient prefers not to have to take additional medication. Suggested patient try drinking a couple glasses of tonic water per day. Wife will discuss this with patient. She will notify NN if patient tries this and it's unsuccessful.

## 2017-02-13 NOTE — PROGRESS NOTES
This note will not be viewable in [address] Received phone call from patient's wife, Alva Ewing, who reported patient needs to be hospitalized. She reports he feels poorly, but decided not to come in over the weekend. Wife reports abdomen is severely distended and the patient has pain in the legs. Wife reports she is going to bring patient to the ED for admission. Spoke to the patient to obtain more information. Patient reports cramping in legs which is causing the pain. Patient reports tonic water has not helped. Phone call returned to patient/wife. Notified patient can be worked in for OP paracentesis today. Also notified prescription can be sent to pharmacy to treat cramping. Patient is agreeable to this plan. Advised to notify NN if medication isn't effective. Patient/wife verbalized understanding.

## 2017-02-13 NOTE — PROGRESS NOTES
#22 saline lock removed with cath tip intact from left AC. 25 grams of Albumin 25% infused during paracentesis per Dr. Tarik Christy. Denies discomfort at puncture site. Able to stand and walk to wheelchair. Discharged to [facility] with wife as .

## 2017-02-13 NOTE — DISCHARGE INSTRUCTIONS
[address] [facility]  Special Procedures/Radiology Department    Radiologist: Anca Anguiano MD    Date:  02-     Thoracentesis Discharge Instructions    You may have an aching pain in the puncture site tonight. Take Tylenol, as directed on the label, for pain or discomfort. Avoid ibuprofen (Advil, Motrin) and aspirin products for the next 48 hours as these drugs may cause you to bleed. Resume your previous diet and follow the medication reconciliation form. Rest for the next 24 hours. If you experience shortness of breath (other than your normal breathing pattern) difficulty breathing, or have severe chest pain, call 911 and go to the nearest Emergency Room. Be sure to follow up with your referring physician as soon as possible    Other:  Call and ask for the RN on call with any questions or problems: [phone] and ask for radiology department.     Lebron Feliciano RN

## 2017-02-13 NOTE — PROGRESS NOTES
To US for paracentesis. Dr. Eliza Terrell at bedside. Assessment as noted. Verbalized understanding of procedure. Consent signed by patient. #22 Saline lock placed left AC for Albumin per order. Dr. Johnnie Berg. 7450 ml fluid removed - cloudy melony fluid. Sample to lab per order. Wife and daughter at bedside. Discharge instructions given. Patient and family verbalize understanding of same.

## 2017-02-15 NOTE — PROGRESS NOTES
Received phone call from [address] with the REACH program stating she has attempted to contact patient to obtain additional information to [facility] with Nexavar. Sarah reports MB is full so she was unable to leave a message. Phone call placed to patient. Reviewed above information. Patient reports he's thought about it more since the office visit with Dr. Barbara Rodriguez and has decided NOT to take Nexavar. Asked that patient notify NN if he changes his mind in the future. Phone call placed to RE[employer]. Notified that patient does not need financial assistance at this time.

## 2017-02-23 NOTE — TELEPHONE ENCOUNTER
Voicemail received from patient's wife stating patient needs another paracentesis. Returned call to Joann. Notified of paracentesis scheduled at [facility] today at 2 pm. Notified to arrive to OP registration (MOB I) at 1:30 pm.     Joann reports patient is in pain. She states he had a couple pain pills left over after gallbladder surgery. She reports he has taken them and they help with the right sided abdominal pain and all-over pain. She called PCP to see if he would prescribe medication, but he is out of the office this week. Reviewed chart and noted patient was given [place]. Wife notified prescription is ready for pickup at .

## 2017-02-23 NOTE — ROUTINE PROCESS
1430Pt in for paracentesis. BP low. Pt and family states that BP runs low. Pt has large amount of fluid to abd and pt has 3-4+ edema to elbert lwr ext. Pt and family aware of risks and benefits and wishes to proceed. 1500- Dr Mcginnis Sports called on MD phone and message left to call regarding pt.      1630- 8400 ml clear yellow fluid removed per paracentesis. Albumin administered. PIV removed after para completed. Pt sebastian procedure w/out diff. Discharge instructions reviewed with pt and family. Discharged to home via W/C.

## 2017-02-23 NOTE — IP AVS SNAPSHOT
Current Discharge Medication List  
  
ASK your doctor about these medications Dose & Instructions Dispensing Information Comments Morning Noon Evening Bedtime  
 aspirin 81 mg chewable tablet Your next dose is: Today, Tomorrow Other:  ____________ Dose:  81 mg Take 81 mg by mouth daily. Refills:  0  
     
   
   
   
  
 baclofen 10 mg tablet Commonly known as:  LIORESAL Your next dose is: Today, Tomorrow Other:  ____________ Dose:  10 mg Take 1 Tab by mouth three (3) times daily as needed. Quantity:  90 Tab Refills:  3  
     
   
   
   
  
 benazepril 10 mg tablet Commonly known as:  LOTENSIN Your next dose is: Today, Tomorrow Other:  ____________ Dose:  10 mg Take 10 mg by mouth daily. Refills:  0  
     
   
   
   
  
 bumetanide 1 mg tablet Commonly known as:  Lydia Baar Your next dose is: Today, Tomorrow Other:  ____________ Dose:  2 mg Take 2 mg by mouth two (2) times a day. Refills:  0  
     
   
   
   
  
 CULTURELLE PROBIOTICS PO Your next dose is: Today, Tomorrow Other:  ____________ Dose:  1 Cap Take 1 Cap by mouth daily. Refills:  0 HYDROcodone-acetaminophen 5-325 mg per tablet Commonly known as:  Jason Shonda Your next dose is: Today, Tomorrow Other:  ____________ Dose:  1 Tab Take 1 Tab by mouth every four (4) hours as needed for Pain. Max Daily Amount: 6 Tabs. Quantity:  60 Tab Refills:  0  
     
   
   
   
  
 * levothyroxine 200 mcg tablet Commonly known as:  SYNTHROID Your next dose is: Today, Tomorrow Other:  ____________ Dose:  400 mcg Take 400 mcg by mouth two (2) days a week. Takes 400 mcg Sat, Sun Refills:  0  
     
   
   
   
  
 * levothyroxine 200 mcg tablet Commonly known as:  SYNTHROID Your next dose is: Today, Tomorrow Other:  ____________ Dose:  200 mcg Take 200 mcg by mouth five (5) days a week. Daily Mon through Fri Refills:  0 LORazepam 1 mg tablet Commonly known as:  ATIVAN Your next dose is: Today, Tomorrow Other:  ____________ Dose:  1 mg Take 1 mg by mouth nightly. Refills:  0  
     
   
   
   
  
 magnesium oxide 400 mg Cap Your next dose is: Today, Tomorrow Other:  ____________ Dose:  400 mg Take 400 mg by mouth daily. Refills:  0  
     
   
   
   
  
 * NovoLOG Mix 70-30 FlexPen 100 unit/mL (70-30) Inpn Generic drug:  insulin aspart protamine/insulin aspart Your next dose is: Today, Tomorrow Other:  ____________ Dose:  50 Units 50 Units by SubCUTAneous route Before breakfast and dinner. Refills:  0  
     
   
   
   
  
 * NovoLOG Mix 70-30 FlexPen 100 unit/mL (70-30) Inpn Generic drug:  insulin aspart protamine/insulin aspart Your next dose is: Today, Tomorrow Other:  ____________ Dose:  10 Units 10 Units by SubCUTAneous route nightly as needed (if BS >190 takes 10 units). Refills:  0  
     
   
   
   
  
 potassium 99 mg tablet Your next dose is: Today, Tomorrow Other:  ____________ Dose:  99 mg Take 99 mg by mouth daily. Refills:  0  
     
   
   
   
  
 VITAMIN B-12 1,000 mcg tablet Generic drug:  cyanocobalamin Your next dose is: Today, Tomorrow Other:  ____________ Dose:  1000 mcg Take 1,000 mcg by mouth daily. Refills:  0  
     
   
   
   
  
 VITAMIN D3 PO Your next dose is: Today, Tomorrow Other:  ____________ Dose:  41178 Units Take 10,000 Units by mouth daily. Refills:  0  
     
   
   
   
  
 * Notice: This list has 4 medication(s) that are the same as other medications prescribed for you.  Read the directions carefully, and ask your doctor or other care provider to review them with you.

## 2017-02-23 NOTE — IP AVS SNAPSHOT
[address] [facility] 
[phone] Patient: Darryl Almazan MRN: [id number] You are allergic to the following Allergen Reactions Lyrica (Pregabalin) Hives Recent Documentation Smoking Status Former Smoker Emergency Contacts Name Discharge Info Relation Home Work Mobile Marco Plan DISCHARGE CAREGIVER [3] Spouse [3] [phone] About your hospitalization You were admitted on:  February 23, 2017 You last received care in the:  [facility] Ultrasound You were discharged on:  February 23, 2017 Unit phone number:  [phone] Why you were hospitalized Your primary diagnosis was:  Not on File Providers Seen During Your Hospitalizations Provider Role Specialty Primary office phone Jose R Sainz MD Attending Provider Hepatology [phone] Your Primary Care Physician (PCP) Primary Care Physician Office Phone Office Fax [address] [address] [phone] Follow-up Information None Your Appointments Tuesday March 07, 2017  9:15 AM EST  
MRI ABD W WO CONT with [facility] MRI [address] [address] MRI Department ([address]) [address] [place] [address]  
[phone] **NPO Nothing to eat or drink 4-6 hours prior to your exam.**  1. Please bring a list or a bag of your current medications to your appointment 2. Please be sure to remove ALL hair clips, pins, extensions, etc., prior to arriving for your MRI procedure. 3. Bring any non [facility] films or CDs pertaining to the area being imaged with you on the day of appointment. 4. A written order with a valid diagnosis and Physicians signature is required for all scheduled tests. 5. Check in at registration 30min before your appointment time unless you were instructed to do otherwise. Patient should report to the MRI department, located on the Ground Floor of the [facility] hospital. Enter through the main entrance, follow the dominguez to the left of the [employer] and escalator. Signs  will direct you to MRI, which is located about midway down the dominguez on the left. Patient should arrive 30 minutes prior to the appointment time unless otherwise instructed. ( NOTE: Patients having MRI BREAST  will be contacted by a Dept Tech with time to arrive to facility) Monday March 13, 2017  9:30 AM EDT Follow Up with Danny Lockhart MD  
[facility] Cyrus Anthony ([facility]) [address] [facility] [phone] [address] [place] [address]  
[phone] Current Discharge Medication List  
  
ASK your doctor about these medications Dose & Instructions Dispensing Information Comments Morning Noon Evening Bedtime  
 aspirin 81 mg chewable tablet Your next dose is: Today, Tomorrow Other:  _________ Dose:  81 mg Take 81 mg by mouth daily. Refills:  0  
     
   
   
   
  
 baclofen 10 mg tablet Commonly known as:  LIORESAL Your next dose is: Today, Tomorrow Other:  _________ Dose:  10 mg Take 1 Tab by mouth three (3) times daily as needed. Quantity:  90 Tab Refills:  3  
     
   
   
   
  
 benazepril 10 mg tablet Commonly known as:  LOTENSIN Your next dose is: Today, Tomorrow Other:  _________ Dose:  10 mg Take 10 mg by mouth daily. Refills:  0  
     
   
   
   
  
 bumetanide 1 mg tablet Commonly known as:  Arletta Hillary Your next dose is: Today, Tomorrow Other:  _________ Dose:  2 mg Take 2 mg by mouth two (2) times a day. Refills:  0  
     
   
   
   
  
 CULTURELLE PROBIOTICS PO Your next dose is: Today, Tomorrow Other:  _________ Dose:  1 Cap Take 1 Cap by mouth daily. Refills:  0 HYDROcodone-acetaminophen 5-325 mg per tablet Commonly known as:  Nithya Trena Your next dose is: Today, Tomorrow Other:  _________ Dose:  1 Tab Take 1 Tab by mouth every four (4) hours as needed for Pain. Max Daily Amount: 6 Tabs. Quantity:  60 Tab Refills:  0  
     
   
   
   
  
 * levothyroxine 200 mcg tablet Commonly known as:  SYNTHROID Your next dose is: Today, Tomorrow Other:  _________ Dose:  400 mcg Take 400 mcg by mouth two (2) days a week. Takes 400 mcg Sat, Sun Refills:  0  
     
   
   
   
  
 * levothyroxine 200 mcg tablet Commonly known as:  SYNTHROID Your next dose is: Today, Tomorrow Other:  _________ Dose:  200 mcg Take 200 mcg by mouth five (5) days a week. Daily Mon through Fri Refills:  0 LORazepam 1 mg tablet Commonly known as:  ATIVAN Your next dose is: Today, Tomorrow Other:  _________ Dose:  1 mg Take 1 mg by mouth nightly. Refills:  0  
     
   
   
   
  
 magnesium oxide 400 mg Cap Your next dose is: Today, Tomorrow Other:  _________ Dose:  400 mg Take 400 mg by mouth daily. Refills:  0  
     
   
   
   
  
 * NovoLOG Mix 70-30 FlexPen 100 unit/mL (70-30) Inpn Generic drug:  insulin aspart protamine/insulin aspart Your next dose is: Today, Tomorrow Other:  _________ Dose:  50 Units 50 Units by SubCUTAneous route Before breakfast and dinner. Refills:  0  
     
   
   
   
  
 * NovoLOG Mix 70-30 FlexPen 100 unit/mL (70-30) Inpn Generic drug:  insulin aspart protamine/insulin aspart Your next dose is: Today, Tomorrow Other:  _________ Dose:  10 Units 10 Units by SubCUTAneous route nightly as needed (if BS >190 takes 10 units). Refills:  0  
     
   
   
   
  
 potassium 99 mg tablet Your next dose is: Today, Tomorrow Other:  _________ Dose:  99 mg Take 99 mg by mouth daily. Refills:  0  
     
   
   
   
  
 VITAMIN B-12 1,000 mcg tablet Generic drug:  cyanocobalamin Your next dose is: Today, Tomorrow Other:  _________ Dose:  1000 mcg Take 1,000 mcg by mouth daily. Refills:  0  
     
   
   
   
  
 VITAMIN D3 PO Your next dose is: Today, Tomorrow Other:  _________ Dose:  45518 Units Take 10,000 Units by mouth daily. Refills:  0  
     
   
   
   
  
 * Notice: This list has 4 medication(s) that are the same as other medications prescribed for you. Read the directions carefully, and ask your doctor or other care provider to review them with you. Discharge Instructions [address] [facility]ENT[facility] DISCHARGE INSTRUCTIONS General Information: 
During this procedure, the doctor will insert a needle into the abdomen to drain fluid. After the procedure, you will be able to take a deep breath much easier. The site of the puncture may ooze the first day. This will decrease and eventually stop. Paracentesis (draining fluid from the abdomen) sometimes makes patients hypotensive (low blood pressure). Your doctor may order for you to receive fluids or albumin (a volume booster) during the procedure through an IV site. Home Care Instructions: 
Keep the puncture site clean and dry. No tub baths or swimming until puncture site heals. Showering is acceptable. Resume your normal diet, and resume your normal activity slowly and as you tolerate. If you are short of breath, rest. If shortness of breath does not ease, please call your ordering doctor. Fluid can re-accumulate in the chest and/or in the abdomen. If this should occur, your doctor needs to know as you may need to have the procedure done again.  
 
Call If: 
   You should call your Physician and/or the Radiology Nurse if you notice any signs of infection, like pus draining, or if it is swollen or reddened. Also call if you have a fever, or if you are bleeding from the puncture site more than a small amount on the dressing. Call if the puncture site keeps draining fluid. Some oozing is to be expected, but should slow and then stop. Call if you feel like you have pressure in your abdomen. SEEK IMMEDIATE CARE OR CALL 911 IF YOU SUDDENLY HAVE TROUBLE BREATHING, OR IF YOUR LIPS TURN BLUE, OR IF YOU NOTICE BLOOD IN YOUR SPUTUM. Follow-Up Instructions: Please see your ordering doctor as he/she has requested. To Reach Us:   
[phone] Date: 2/23/2017 Discharging Nurse: Renetta Garnica RN Discharge Orders None [facility] SERVICES! Dear Natalia Nieto: Thank you for requesting a eTelemetry account. Our records indicate that you already have an active eTelemetry account. You can access your account anytime at [email or web address] Did you know that you can access your hospital and ER discharge instructions at any time in eTelemetry? You can also review all of your test results from your hospital stay or ER visit. Additional Information If you have questions, please visit the Frequently Asked Questions section of the eTelemetry website at [email or web address] Remember, eTelemetry is NOT to be used for urgent needs. For medical emergencies, dial 911. Now available from your iPhone and Android! General Information Please provide this summary of care documentation to your next provider. Patient Signature:  ____________________________________________________________ Date:  ____________________________________________________________  
  
Baylee Santos Provider Signature:  ____________________________________________________________ Date:  ____________________________________________________________

## 2017-02-23 NOTE — DISCHARGE INSTRUCTIONS
[address] [facility] DISCHARGE INSTRUCTIONS    General Information:  During this procedure, the doctor will insert a needle into the abdomen to drain fluid. After the procedure, you will be able to take a deep breath much easier. The site of the puncture may ooze the first day. This will decrease and eventually stop. Paracentesis (draining fluid from the abdomen) sometimes makes patients hypotensive (low blood pressure). Your doctor may order for you to receive fluids or albumin (a volume booster) during the procedure through an IV site. Home Care Instructions:  Keep the puncture site clean and dry. No tub baths or swimming until puncture site heals. Showering is acceptable. Resume your normal diet, and resume your normal activity slowly and as you tolerate. If you are short of breath, rest. If shortness of breath does not ease, please call your ordering doctor. Fluid can re-accumulate in the chest and/or in the abdomen. If this should occur, your doctor needs to know as you may need to have the procedure done again. Call If:     You should call your Physician and/or the Radiology Nurse if you notice any signs of infection, like pus draining, or if it is swollen or reddened. Also call if you have a fever, or if you are bleeding from the puncture site more than a small amount on the dressing. Call if the puncture site keeps draining fluid. Some oozing is to be expected, but should slow and then stop. Call if you feel like you have pressure in your abdomen. SEEK IMMEDIATE CARE OR CALL 911 IF YOU SUDDENLY HAVE TROUBLE BREATHING, OR IF YOUR LIPS TURN BLUE, OR IF YOU NOTICE BLOOD IN YOUR SPUTUM. Follow-Up Instructions: Please see your ordering doctor as he/she has requested.      To Reach Us:    [phone]  Date: 2/23/2017  Discharging Nurse: Gabe Guerrero RN

## 2017-02-28 PROBLEM — I95.9 HYPOTENSION: Status: ACTIVE | Noted: 2017-01-01

## 2017-02-28 PROBLEM — N17.9 AKI (ACUTE KIDNEY INJURY) (HCC): Status: ACTIVE | Noted: 2017-01-01

## 2017-02-28 NOTE — IP AVS SNAPSHOT
2700 [facility] [address] [place] [address] 
[phone] Patient: Yuli Medeiros MRN: [id number] NOM:88/99/8520 You are allergic to the following Allergen Reactions Lyrica (Pregabalin) Hives Recent Documentation Height Weight BMI Smoking Status 1.88 m 118.9 kg 33.66 kg/m2 Former Smoker Unresulted Labs Order Current Status AFP WITH AFP-L3% In process CULTURE, BLOOD, PAIRED Preliminary result Emergency Contacts Name Discharge Info Relation Home Work Mobile Marko Arrieta DISCHARGE CAREGIVER [3] Spouse [3] [phone] About your hospitalization You were admitted on:  February 28, 2017 You last received care in the:  [phone] You were discharged on:  March 4, 2017 Unit phone number:  [phone] Why you were hospitalized Your primary diagnosis was:  Hypotension Your diagnoses also included:  Marino (Acute Kidney Injury) (Hcc), Cirrhosis (Hcc), Diabetes Mellitus, Type 2 (Hcc) Providers Seen During Your Hospitalizations Provider Role Specialty Primary office phone Ramana Choudhary MD Attending Provider Emergency Medicine [phone] Faraz Morgan MD Attending Provider Hospitalist [phone] Aguilar Nava MD Attending Provider Internal Medicine [phone] Your Primary Care Physician (PCP) Primary Care Physician Office Phone Office Fax 099 [facility] 65 [phone] Follow-up Information Follow up With Details Comments Contact Info [address] On 3/5/2017 For home health nursing, physical therapy, and occupational therapy. Please contact agency if you have not heard from them by 12 PM the first full day after discharge. Marleni Villarreal [facility] [address] 
[phone] Anthony Vazquez MD In 2 weeks Discharge follow up  [place] [id number] [phone] Jessica Garcia MD  F/U next week in office, call to make appointment [address] [place] [address] [address] 
[phone] Your Appointments Tuesday March 07, 2017  9:15 AM EST  
MRI ABD W WO CONT with [facility] MRI 2 [facility] MRI Department ([address]) [address] [place] [address]  
[phone] **NPO Nothing to eat or drink 4-6 hours prior to your exam.**  1. Please bring a list or a bag of your current medications to your appointment 2. Please be sure to remove ALL hair clips, pins, extensions, etc., prior to arriving for your MRI procedure. 3. Bring any non [facility] films or CDs pertaining to the area being imaged with you on the day of appointment. 4. A written order with a valid diagnosis and Physicians signature is required for all scheduled tests. 5. Check in at registration 30min before your appointment time unless you were instructed to do otherwise. Patient should report to the MRI department, located on the Ground Floor of the [facility] hospital. Enter through the main entrance, follow the dominguez to the left of the [employer] and escalator. Signs  will direct you to MRI, which is located about midway down the dominguez on the left. Patient should arrive 30 minutes prior to the appointment time unless otherwise instructed. ( NOTE: Patients having MRI BREAST  will be contacted by a Dept Tech with time to arrive to facility) Monday March 13, 2017  9:30 AM EDT Follow Up with Jessica Garcia MD  
[facility] [address] [place] [address] Yusuf [phone] [address] [facility] [address]  
[phone] Current Discharge Medication List  
  
START taking these medications Dose & Instructions Dispensing Information Comments Morning Noon Evening Bedtime  
 sodium bicarbonate 325 mg tablet Your next dose is: Today, Tomorrow Other:  _________ Dose:  325 mg Take 1 Tab by mouth two (2) times a day. Quantity:  60 Tab Refills:  0  
     
   
   
   
  
 traMADol 50 mg tablet Commonly known as:  ULTRAM  
   
Your next dose is: Today, Tomorrow Other:  _________ Dose:  50 mg Take 1 Tab by mouth every six (6) hours as needed. Max Daily Amount: 200 mg. Quantity:  30 Tab Refills:  0 CONTINUE these medications which have CHANGED Dose & Instructions Dispensing Information Comments Morning Noon Evening Bedtime  
 bumetanide 1 mg tablet Commonly known as:  Sadi Sherif What changed:  additional instructions Your next dose is: Today, Tomorrow Other:  _________ Dose:  2 mg Take 2 Tabs by mouth two (2) times a day. Resume on 3/6/17. Quantity:  60 Tab Refills:  1 CONTINUE these medications which have NOT CHANGED Dose & Instructions Dispensing Information Comments Morning Noon Evening Bedtime CULTURELLE PROBIOTICS PO Your next dose is: Today, Tomorrow Other:  _________ Dose:  1 Cap Take 1 Cap by mouth every evening. Refills:  0  
     
   
   
   
  
 * levothyroxine 200 mcg tablet Commonly known as:  SYNTHROID Your next dose is: Today, Tomorrow Other:  _________ Dose:  400 mcg Take 400 mcg by mouth two (2) days a week. Takes 400 mcg Sat, Sun Refills:  0  
     
   
   
   
  
 * levothyroxine 200 mcg tablet Commonly known as:  SYNTHROID Your next dose is: Today, Tomorrow Other:  _________ Dose:  200 mcg Take 200 mcg by mouth five (5) days a week. Daily Mon through Fri Refills:  0 LORazepam 1 mg tablet Commonly known as:  ATIVAN Your next dose is: Today, Tomorrow Other:  _________ Dose:  1 mg Take 1 mg by mouth nightly. Refills:  0  
     
   
   
   
  
 magnesium oxide 400 mg Cap Your next dose is: Today, Tomorrow Other:  _________ Dose:  400 mg Take 400 mg by mouth daily. (ran out) Refills:  0  
     
   
   
   
  
 * NovoLOG Mix 70-30 FlexPen 100 unit/mL (70-30) Inpn Generic drug:  insulin aspart protamine/insulin aspart Your next dose is: Today, Tomorrow Other:  _________ Dose:  50 Units 50 Units by SubCUTAneous route Before breakfast and dinner. Refills:  0  
     
   
   
   
  
 * NovoLOG Mix 70-30 FlexPen 100 unit/mL (70-30) Inpn Generic drug:  insulin aspart protamine/insulin aspart Your next dose is: Today, Tomorrow Other:  _________ Dose:  10 Units 10 Units by SubCUTAneous route nightly as needed (if BS >190 takes 10 units). Refills:  0  
     
   
   
   
  
 potassium 99 mg tablet Your next dose is: Today, Tomorrow Other:  _________ Dose:  99 mg Take 99 mg by mouth every evening. Refills:  0  
     
   
   
   
  
 VITAMIN B-12 1,000 mcg tablet Generic drug:  cyanocobalamin Your next dose is: Today, Tomorrow Other:  _________ Dose:  1000 mcg Take 1,000 mcg by mouth daily. Refills:  0  
     
   
   
   
  
 VITAMIN D3 PO Your next dose is: Today, Tomorrow Other:  _________ Dose:  05205 Units Take 10,000 Units by mouth daily. Refills:  0  
     
   
   
   
  
 * Notice: This list has 4 medication(s) that are the same as other medications prescribed for you. Read the directions carefully, and ask your doctor or other care provider to review them with you. STOP taking these medications   
 benazepril 10 mg tablet Commonly known as:  LOTENSIN Where to Get Your Medications Information on where to get these meds will be given to you by the nurse or doctor. ! Ask your nurse or doctor about these medications  
  bumetanide 1 mg tablet  
 sodium bicarbonate 325 mg tablet traMADol 50 mg tablet Discharge Instructions Please bring this form with you to show your primary care provider at your follow-up appointment. Primary care provider:  Dr. Sarah Mcgee MD 
 
Discharging provider:  Janina Campa MD 
 
You have been admitted to the hospital with the following diagnoses: · Hypotension FOLLOW-UP CARE RECOMMENDATIONS: 
 
APPOINTMENTS: 
Follow-up Information Follow up With Details Comments Contact Info [address] On 3/5/2017 For home health nursing, physical therapy, and occupational therapy. Please contact agency if you have not heard from them by 12 PM the first full day after discharge. Marleni Villarreal 40 [facility] [address] 
[phone] Rayshawn Ross MD In 2 weeks Discharge follow up  [place] [address] [address] 
[phone] Joel King MD  F/U next week in office, call to make appointment 200 [facility] [address] [place] [address] 
[phone] FOLLOW-UP TESTS recommended: 1. Repeat labs next week at f/u appointment with Jesusita Gooden PENDING TEST RESULTS: 
At the time of your discharge the following test results are still pending: none Please make sure you review these results with your outpatient follow-up provider(s). SYMPTOMS to watch for: chest pain, shortness of breath, fever, chills, nausea, vomiting, diarrhea, change in mentation, falling, weakness, bleeding. DIET/what to eat:  Cardiac Diet ACTIVITY:  Activity as tolerated with Home health PT/OT 
 
WOUND CARE: NONE 
 
EQUIPMENT needed:  NONE What to do if new or unexpected symptoms occur? If you experience any of the above symptoms (or should other concerns or questions arise after discharge) please call your primary care physician. Return to the emergency room if you cannot get hold of your doctor. · It is very important that you keep your follow-up appointment(s). · Please bring discharge papers, medication list (and/or medication bottles) to your follow-up appointments for review by your outpatient provider(s). · Please check the list of medications and be sure it includes every medication (even non-prescription medications) that your provider wants you to take. · It is important that you take the medication exactly as they are prescribed. · Keep your medication in the bottles provided by the pharmacist and keep a list of the medication names, dosages, and times to be taken in your wallet. · Do not take other medications without consulting your doctor. · If you have any questions about your medications or other instructions, please talk to your nurse or care provider before you leave the hospital. 
 
I understand that if any problems occur once I am at home I am to contact my physician. These instructions were explained to me and I had the opportunity to ask questions. Discharge Orders None Zyante Announcement We are excited to announce that we are making your provider's discharge notes available to you in Zyante. You will see these notes when they are completed and signed by the physician that discharged you from your recent hospital stay. If you have any questions or concerns about any information you see in Zyante, please call the Health Information Department where you were seen or reach out to your Primary Care Provider for more information about your plan of care. Introducing [facility]! Dear Reese Jimenez: Thank you for requesting a Zyante account. Our records indicate that you already have an active Zyante account. You can access your account anytime at [email or web address] Did you know that you can access your hospital and ER discharge instructions at any time in Zyante? You can also review all of your test results from your hospital stay or ER visit. Additional Information If you have questions, please visit the Frequently Asked Questions section of the [employer] website at [email or web address] Remember, MyChart is NOT to be used for urgent needs. For medical emergencies, dial 911. Now available from your iPhone and Android! General Information Please provide this summary of care documentation to your next provider. Patient Signature:  ____________________________________________________________ Date:  ____________________________________________________________  
  
[facility] Provider Signature:  ____________________________________________________________ Date:  ____________________________________________________________

## 2017-02-28 NOTE — ED PROVIDER NOTES
HPI Comments: 78 y.o. male with past medical history significant for chronic diastolic heart failure, LVH, DM, hyperlipidemia, hypothyroidism, COPD, liver cancer/cirrhosis and cholecystectomy who presents from home with chief complaint of fatigue. Patient reports that he is currently undergoing treatment for liver cancer under the care of his hepatologist, Dr. Angela Guerra. He reports having over 21 liters of fluid drained in the past 3 weeks but notes that he had begun to gain weight again. Patient states that he has felt bad \"for a while\" but arrives today on referral from his PCP/hepatologist for continued hypotension (~2 months of BPs around 70/40) and yesterday experiencing dizziness, fatigue, nausea, vomiting and weakness. Per patient, yesterday was a \"bad day\" after he took hydrocodone 5-325 for chronic upper abdominal pain. He did not take hydrocodone today and says he feels \"a little bit better. \" Patient denies any syncopal episodes. There are no other acute medical concerns at this time. Social hx: Former alcohol intake, former smoker. PCP: Deirdre Miramontes MD  Hepatologist: Ava Garg MD    Note written by nelly Rosario Chi, as dictated by Sofie Posey MD 11:46 AM        The history is provided by the patient.         Past Medical History:   Diagnosis Date    Acute diastolic heart failure ([address]     Arthritis     Cancer ([address]     liver cancer/ cirrhosis    Cataract     Chronic diastolic heart failure ([address] 11/9/2011    Colon polyps 2002    COPD (chronic obstructive pulmonary disease) (HCC)     no med use    Diabetes mellitus, type 2 ([address] 1995    ED (erectile dysfunction)     Hyperlipidemia     Hypothyroid     Ill-defined condition     pt states he was given BP med to protect kidneys d/t diabetes    Ill-defined condition 12/20/2016    34 pound intention wt loss since early Nov 2016    Liver disease     abnormal MRI    LVH (left ventricular hypertrophy) due to hypertensive disease  Vitamin B 12 deficiency        Past Surgical History:   Procedure Laterality Date    HX LAP CHOLECYSTECTOMY  10/13/2016    by Dr Tara Guzmán    back surgery - disc    HX OTHER SURGICAL      1/2 thyroid removed as a teenager/had a ingrown goiter    HX TONSILLECTOMY           Family History:   Problem Relation Age of Onset    Heart Disease Father     Heart Attack Father     Cancer Mother      brain tumor    Cancer Sister      kidney       Social History     Social History    Marital status:      Spouse name: N/A    Number of children: N/A    Years of education: N/A     Occupational History    Not on file. Social History Main Topics    Smoking status: Former Smoker     Packs/day: 1.50    Smokeless tobacco: Former User     Quit date: 10/13/2010    Alcohol use No    Drug use: Not on file    Sexual activity: Not on file     Other Topics Concern    Not on file     Social History Narrative         ALLERGIES: Lyrica [pregabalin]    Review of Systems   Constitutional: Positive for fatigue. Negative for appetite change, chills and fever. HENT: Negative for rhinorrhea, sore throat and trouble swallowing. Eyes: Negative for photophobia. Respiratory: Negative for cough and shortness of breath. Cardiovascular: Negative for chest pain and palpitations. Gastrointestinal: Positive for abdominal distention, abdominal pain, nausea and vomiting. Genitourinary: Negative for dysuria, frequency and hematuria. Musculoskeletal: Negative for arthralgias. Neurological: Positive for dizziness and weakness. Negative for syncope. Psychiatric/Behavioral: Negative for behavioral problems. The patient is not nervous/anxious.         Vitals:    02/28/17 1039   BP: (!) 78/49   Pulse: 72   Resp: 20   Temp: 97.4 °F (36.3 °C)   SpO2: 97%   Weight: 123.8 kg (273 lb)   Height: 6' 2\" (1.88 m)            Physical Exam   Constitutional: He appears well-developed and well-nourished. HENT:   Head: Normocephalic and atraumatic. Mouth/Throat: Oropharynx is clear and moist.   Eyes: EOM are normal. Pupils are equal, round, and reactive to light. Neck: Normal range of motion. Neck supple. Cardiovascular: Normal rate, regular rhythm, normal heart sounds and intact distal pulses. Exam reveals no gallop and no friction rub. No murmur heard. Pulmonary/Chest: Effort normal. He has no wheezes. He has no rales. Mild dyspnea at rest.  Well healed suprasternal surgical scar. Abdominal: Soft. He exhibits distension. There is no tenderness. There is no rebound. Ascites   Musculoskeletal: Normal range of motion. He exhibits no tenderness. Neurological: He is alert. No cranial nerve deficit. Motor; symmetric   Skin: No erythema. Psychiatric: He has a normal mood and affect. His behavior is normal.   Nursing note and vitals reviewed. Note written by nelly Browne, as dictated by Marcelo Maldonado MD 11:48 AM       MDM  Number of Diagnoses or Management Options  Acidosis:   Acute renal failure, unspecified acute renal failure type [facility]):   Azotemia:   Dehydration:   Malignant ascites:   Critical Care  Total time providing critical care: 30-74 minutes  Total critical care time spend exclusive of procedures:  40 minutes  ED EKG interpretation:  Rhythm: normal sinus rhythm; and regular . Rate (approx.): 70; Axis: normal; P wave: prolonged; QRS interval: normal ; ST/T wave: non-specific changes; in  Lead: ; Other findings: . This EKG was interpreted by Marcelo Maldonado MD,ED Provider. 1:06 PM      ED Course       Procedures    Note: Patient has required 3 recent paracenteses for malignant ascites. Most notable labs today show acidosis and azotemia possibly acute renal failure. Patient we given 1 L of fluid over 4 hours in hopes of improving his renal function. He reports chronically low blood pressures which continue intermittently in the ER.  Plan is to admit the patient for further volume status measures.   1:03 PM  Uvaldo Mejia MD

## 2017-02-28 NOTE — ED TRIAGE NOTES
Patient comes to the ER c/o hypotension. Patient was at the doctors office and was told to come to the ER to be admitted for low blood pressure.

## 2017-02-28 NOTE — PROGRESS NOTES
TRANSFER - IN REPORT:    Verbal report received from Joel Rosen (name) on Dixon Padilla  being received from ER (unit) for routine progression of care      Report consisted of patients Situation, Background, Assessment and   Recommendations(SBAR). Information from the following report(s) SBAR was reviewed with the receiving nurse. Opportunity for questions and clarification was provided. Assessment completed upon patients arrival to unit and care assumed.

## 2017-02-28 NOTE — H&P
HISTORY AND PHYSICAL      PCP: Yanet Hidalgo MD  History source: the patient and his wife      CC: low blood pressure      HPI: Mr. Isis Hassan is a 78 y.o man with cryptogenic cirrhosis, HCC, HFPEF, DM II, hypothyroidism, and COPD, who presents with hypotension. He reports dealing with hypotension for 2-3 months, his BP has been in the 90s/50s, but never made him feel poorly. Over the past month, he underwent three large-volume paracenteses with ~20L out, and since the last one, he's felt extreme fatigue. Any activity makes him very tired, standing up makes him dizzy, and he has no energy to do anything. He notes his abdomen is distended which is normal for him, but his legs are not as edematous as they usually are. There are no alleviating factors. His PO intake has been poor. Despite his hypotension, he continues taking benazepril and bumetanide BID. He notes decreased urine output. He denies fever, chills, cough, diarrhea, abdominal pain, or dysuria. He was at his hepatologist's office today and was instructed to come in for symptomatic hypotension.       PMH/PSH:  Past Medical History:   Diagnosis Date    Acute diastolic heart failure ([address]     Arthritis     Cancer ([address]     liver cancer/ cirrhosis    Cataract     Chronic diastolic heart failure ([address] 11/9/2011    Colon polyps 2002    COPD (chronic obstructive pulmonary disease) (HCC)     no med use    Diabetes mellitus, type 2 ([address] 1995    ED (erectile dysfunction)     Hyperlipidemia     Hypothyroid     Ill-defined condition     pt states he was given BP med to protect kidneys d/t diabetes    Ill-defined condition 12/20/2016    34 pound intention wt loss since early Nov 2016    Liver disease     abnormal MRI    LVH (left ventricular hypertrophy) due to hypertensive disease     Vitamin B 12 deficiency      Past Surgical History:   Procedure Laterality Date    HX LAP CHOLECYSTECTOMY  10/13/2016    by Dr Guzman Cons back surgery - disc    HX OTHER SURGICAL      1/2 thyroid removed as a teenager/had a ingrown goiter    HX TONSILLECTOMY         Home meds:   Prior to Admission medications    Medication Sig Start Date End Date Taking? Authorizing Provider   CHOLECALCIFEROL, VITAMIN D3, (VITAMIN D3 PO) Take 10,000 Units by mouth daily. Yes Historical Provider   insulin aspart protamine/insulin aspart (NOVOLOG MIX 70-30 FLEXPEN) 100 unit/mL (70-30) inpn 10 Units by SubCUTAneous route nightly as needed (if BS >190 takes 10 units). Yes Historical Provider   levothyroxine (SYNTHROID) 200 mcg tablet Take 200 mcg by mouth five (5) days a week. Daily Mon through Fri   Yes Historical Provider   potassium 99 mg tablet Take 99 mg by mouth every evening. Yes Historical Provider   insulin aspart protamine/insulin aspart (NOVOLOG MIX 70-30 FLEXPEN) 100 unit/mL (70-30) inpn 50 Units by SubCUTAneous route Before breakfast and dinner. Yes Historical Provider   L. RHAMNOSUS GG/INULIN (CULTURELLE PROBIOTICS PO) Take 1 Cap by mouth every evening. Yes Historical Provider   levothyroxine (SYNTHROID) 200 mcg tablet Take 400 mcg by mouth two (2) days a week. Takes 400 mcg Sat, Sun   Yes Historical Provider   benazepril (LOTENSIN) 10 mg tablet Take 10 mg by mouth daily. Yes Historical Provider   bumetanide (BUMEX) 1 mg tablet Take 2 mg by mouth two (2) times a day. Yes Historical Provider   cyanocobalamin (VITAMIN B-12) 1,000 mcg tablet Take 1,000 mcg by mouth daily. Yes Historical Provider   lorazepam (ATIVAN) 1 mg tablet Take 1 mg by mouth nightly. 11/15/10  Yes Historical Provider   magnesium oxide 400 mg cap Take 400 mg by mouth daily. (ran out)    Historical Provider       Allergies:   Allergies   Allergen Reactions    Lyrica [Pregabalin] Hives       FH:  Family History   Problem Relation Age of Onset    Heart Disease Father     Heart Attack Father     Cancer Mother      brain tumor    Cancer Sister      kidney SH:  Social History   Substance Use Topics    Smoking status: Former Smoker     Packs/day: 1.50    Smokeless tobacco: Former User     Quit date: 10/13/2010    Alcohol use No       ROS: A comprehensive review of systems was negative except for that written in the HPI. PHYSICAL EXAM:  Visit Vitals    BP 97/50    Pulse 78    Temp 97.4 °F (36.3 °C)    Resp 21    Ht 6' 2\" (1.88 m)    Wt 123.8 kg (273 lb)    SpO2 92%    BMI 35.05 kg/m2       Gen: lethargic, NAD  HEENT: anicteric sclerae, pale conjunctiva, OP clear, MM dry  Neck: supple, no LAD  Heart: RRR, no MRG, no JVD, trace peripheral edema  Lungs: CTA b/l, no accessory muscle use  Abd: soft, NT, mildly distended, bs+  Extr: warm  Skin: no rash seen, dry  Neuro: grossly non-focal  Psych: a&o, flat affect      Labs/Imaging:    Recent Labs      02/28/17   1130   WBC  7.3   HGB  9.3*   HCT  27.5*   PLT  145*     Recent Labs      02/28/17   1130   NA  130*   K  4.3   CL  97   CO2  18*   BUN  80*   CREA  3.11*   GLU  259*   CA  7.7*     Recent Labs      02/28/17   1130   SGOT  29   ALT  23   AP  314*   TBILI  0.5   TP  6.2*   ALB  2.2*   GLOB  4.0       Recent Labs      02/28/17   1130   INR  1.1   PTP  10.7   APTT  35.6*      CXR shows no acute process        Assessment & Plan: Mr. Alf Velez is a 78 y.o man with cryptogenic cirrhosis, HCC, HFPEF, DM II, hypothyroidism, and COPD, who presents with hypotension and renal failure. Hypotension: suspect this is due to volume depletion confounded by medication use and cirrhosis  -hold benazepril and bumetanide  -IV hydration with albumin 25 g q6h and NS 75 cc/hr  -check serum cortisol to r/o adrenal insufficieny  -blood cultures and UA to r/o occult infection    YANIV: (POA) in reviewing prior labs he may have underlying CKD stage II  -suspect this is due to volume depletion from diuretic use, paracenteses, and ACEi use.  Hepatorenal also on ddx  -check urine sodium and creatinine, renal US  -IV hydration as above  -monitor urine output closely  -hold benazepril and bumetanide  -consult nephrology    Hyponatremia: likely hypovolemic given the above clinical picture. F/u urine studies, monitor serum sodium.     Chronic diastolic heart failure: NYHA class I, not in any exacerbation currently  -holding ACEi as above  -wasn't on BB in the past due to hx of bradycardia per chart review    Cryptogenic cirrhosis: w/ [address]  -consult Dr. Vernon Hyde    Hypothyroidism: he's on high doses of levothyroxine  -check TSH    COPD: stable  -provide PRN inhaled bronchodilators    DVT ppx: SCDs  Code status: full  Disposition: TBD    Signed By: Jimy Lopez MD     February 28, 2017

## 2017-02-28 NOTE — ROUTINE PROCESS
TRANSFER - OUT REPORT:    Verbal report given to [facility] RN(name) on Yuli Medeiros  being transferred to [address] [place] [address](unit) for routine progression of care       Report consisted of patients Situation, Background, Assessment and   Recommendations(SBAR). Information from the following report(s) SBAR, Kardex, ED Summary, MAR, Recent Results and Cardiac Rhythm NSR was reviewed with the receiving nurse. Lines:   Peripheral IV 02/28/17 Left Antecubital (Active)   Site Assessment Clean, dry, & intact 2/28/2017 11:34 AM   Phlebitis Assessment 0 2/28/2017 11:34 AM   Infiltration Assessment 0 2/28/2017 11:34 AM   Dressing Status Clean, dry, & intact 2/28/2017 11:34 AM   Dressing Type Transparent 2/28/2017 11:34 AM   Hub Color/Line Status Pink;Capped;Flushed;Patent 2/28/2017 11:34 AM   Action Taken Blood drawn 2/28/2017 11:34 AM       Peripheral IV 02/28/17 Right Antecubital (Active)   Site Assessment Clean, dry, & intact 2/28/2017  5:01 PM   Phlebitis Assessment 0 2/28/2017  5:01 PM   Infiltration Assessment 0 2/28/2017  5:01 PM   Dressing Status Clean, dry, & intact 2/28/2017  5:01 PM   Dressing Type Transparent 2/28/2017  5:01 PM   Hub Color/Line Status Pink;Flushed;Patent 2/28/2017  5:01 PM   Action Taken Blood drawn 2/28/2017  5:01 PM        Opportunity for questions and clarification was provided.       Patient transported with:   transport

## 2017-02-28 NOTE — CONSULTS
[facility]   [address] [facility], [address] [place] [address], [facility]  Phone: [phone] NOTE     Patient: Teddy Jacobo MRN: [id number]  PCP: Fiona Pacheco MD   :     1940  Age:   78 y.o. Sex:  male      Referring physician: Carmine Martinez MD  Reason for consultation: 78 y.o. male with Hypotension complicated by YANIV   Admission Date: 2017 10:46 AM  LOS: 0 days      ASSESSMENT and PLAN :   YANIV on CKD :  - etiology : severe pre renal azotemia from Large volume paracentesis  + Hypotension + ACE (I) use   - non oliguric   - expect improvement with volume expansion, holding diuretics and benazepril   - volume expansion w/ IV albumin    CKD :  - Likely 2/2 DM,HTN  - baseline Cr ~ 1.3-1.4 prior to Cirrhosis diagnosis in 2016   - No proteinuria   - renal US - unremarkable     Cryptogenic Cirrhosis     Extensive HCC w/ Portal vein invasion :  - s/p TARE     Hypovolemic Hyponatremia    Metabolic acidosis : 2/2 ARF    Hypotension : likely 2./2 Hypovolemia + BP meds   ______________________________________________________________________  Thank you for asking me to see Mr Aimee Brittle        Subjective:   HPI: Teddy Jacobo is a 78 y.o.  male who has been admitted to the hospital for Hypotension w/ SBP 70s   He was sent from Hepatology office where he was for follow up of Cryptogenic cirrhosis and HCC  In ER, he was found to be in severe degree ARF with Hyponatremia and Metabolic acidosis for which I was asked to see him     Mr Aimee Brittle is a very pleasant and unfortunate elderly man who was diagnosed with [address] when he underwent Lap Reba in October  Subsequently he had MRI scans that confirmed extensive [address] involving both lobes of liver and Cirrhosis   W/U of cirrhosis was negative   He was offerred chemo and TACE .  He chose to do TARE along and no chemo   He underwent TARE 3 weeks ago  After that his Ascitis worsened and needed 3 large volume Bhavin since with close to 20 L fluid being tapped  He has developed hypotension after last para   He has been taking his bumex and Benazepril till date       Past Medical Hx:   Past Medical History:   Diagnosis Date    [facility]     Arthritis     Cancer ([facility]     liver cancer/ cirrhosis    Cataract     Chronic diastolic heart failure ([facility] 11/9/2011    Colon polyps 2002    COPD (chronic obstructive pulmonary disease) (HCC)     no med use    Diabetes mellitus, type 2 ([facility] 1995    ED (erectile dysfunction)     Hyperlipidemia     Hypothyroid     Ill-defined condition     pt states he was given BP med to protect kidneys d/t diabetes    Ill-defined condition 12/20/2016    34 pound intention wt loss since early Nov 2016    Liver disease     abnormal MRI    LVH (left ventricular hypertrophy) due to hypertensive disease     Vitamin B 12 deficiency         Past Surgical Hx:     Past Surgical History:   Procedure Laterality Date    HX LAP CHOLECYSTECTOMY  10/13/2016    by Dr Breana Kirkpatrick    back surgery - disc    HX OTHER SURGICAL      1/2 thyroid removed as a teenager/had a ingrown goiter    HX TONSILLECTOMY         Medications:  Prior to Admission medications    Medication Sig Start Date End Date Taking? Authorizing Provider   CHOLECALCIFEROL, VITAMIN D3, (VITAMIN D3 PO) Take 10,000 Units by mouth daily. Yes Historical Provider   insulin aspart protamine/insulin aspart (NOVOLOG MIX 70-30 FLEXPEN) 100 unit/mL (70-30) inpn 10 Units by SubCUTAneous route nightly as needed (if BS >190 takes 10 units). Yes Historical Provider   levothyroxine (SYNTHROID) 200 mcg tablet Take 200 mcg by mouth five (5) days a week. Daily Mon through Fri   Yes Historical Provider   potassium 99 mg tablet Take 99 mg by mouth every evening.    Yes Historical Provider   insulin aspart protamine/insulin aspart (NOVOLOG MIX 70-30 FLEXPEN) 100 unit/mL (70-30) inpn 50 Units by SubCUTAneous route Before breakfast and dinner. Yes Historical Provider   L. RHAMNOSUS GG/INULIN (CULTURELLE PROBIOTICS PO) Take 1 Cap by mouth every evening. Yes Historical Provider   levothyroxine (SYNTHROID) 200 mcg tablet Take 400 mcg by mouth two (2) days a week. Takes 400 mcg Sat, Sun   Yes Historical Provider   benazepril (LOTENSIN) 10 mg tablet Take 10 mg by mouth daily. Yes Historical Provider   bumetanide (BUMEX) 1 mg tablet Take 2 mg by mouth two (2) times a day. Yes Historical Provider   cyanocobalamin (VITAMIN B-12) 1,000 mcg tablet Take 1,000 mcg by mouth daily. Yes Historical Provider   lorazepam (ATIVAN) 1 mg tablet Take 1 mg by mouth nightly. 11/15/10  Yes Historical Provider   magnesium oxide 400 mg cap Take 400 mg by mouth daily. (ran out)    Historical Provider       Allergies   Allergen Reactions    Lyrica [Pregabalin] Hives       Social Hx:  reports that he has quit smoking. He smoked 1.50 packs per day. He quit smokeless tobacco use about 6 years ago. He reports that he does not drink alcohol. Family History   Problem Relation Age of Onset    Heart Disease Father     Heart Attack Father     Cancer Mother      brain tumor    Cancer Sister      kidney       Review of Systems:  A twelve point review of system was performed today. Pertinent positives and negatives are mentioned in the HPI. The reminder of the ROS is negative and noncontributory.      Objective:    Vitals:    Vitals:    02/28/17 1400 02/28/17 1430 02/28/17 1500 02/28/17 1530   BP: 91/52 111/49 114/52 116/54   Pulse:   70 69   Resp:   20 25   Temp:       SpO2: 96% 93% 98% 97%   Weight:       Height:         I&O's:     Visit Vitals    /54    Pulse 69    Temp 97.4 °F (36.3 °C)    Resp 25    Ht 6' 2\" (1.88 m)    Wt 123.8 kg (273 lb)    SpO2 97%    BMI 35.05 kg/m2       Physical Exam:  General:Alert, No distress, Obese  HEENT: pale and icteric   Neck:Supple,no mass palpable  Lungs : Clears to auscultation Bilaterally, Normal respiratory effort  CVS: RRR, S1 S2 normal, No rub, trace LE edema  Abdomen: Soft, distended Non tender,  bowel sounds present  Extremities: No cyanosis, No clubbing  Skin: No rash or lesions. Lymph nodes: No palpable nodes  MS: No joint swelling, erythema, warmth  Neurologic: non focal, AAO x 3      Laboratory Results:    Recent Labs      02/28/17   1130   NA  130*   K  4.3   CL  97   CO2  18*   GLU  259*   BUN  80*   CREA  3.11*   CA  7.7*   ALB  2.2*   SGOT  29   ALT  23   INR  1.1     Recent Labs      02/28/17   1130   WBC  7.3   HGB  9.3*   HCT  27.5*   PLT  145*     No results found for: SDES  No results found for: CULT  Recent Results (from the past 24 hour(s))   EKG, 12 LEAD, INITIAL    Collection Time: 02/28/17 11:04 AM   Result Value Ref Range    Ventricular Rate 69 BPM    Atrial Rate 69 BPM    P-R Interval 204 ms    QRS Duration 94 ms    Q-T Interval 410 ms    QTC Calculation (Bezet) 439 ms    Calculated P Axis -24 degrees    Calculated R Axis 11 degrees    Calculated T Axis 11 degrees    Diagnosis       Normal sinus rhythm  Low voltage QRS  Nonspecific T wave abnormality  When compared with ECG of 27-JAN-2017 10:30,  Nonspecific T wave abnormality now evident in Anterior leads     CBC WITH AUTOMATED DIFF    Collection Time: 02/28/17 11:30 AM   Result Value Ref Range    WBC 7.3 4.1 - 11.1 K/uL    RBC 2.75 (L) 4.10 - 5.70 M/uL    HGB 9.3 (L) 12.1 - 17.0 g/dL    HCT 27.5 (L) 36.6 - 50.3 %    .0 (H) 80.0 - 99.0 FL    MCH 33.8 26.0 - 34.0 PG    MCHC 33.8 30.0 - 36.5 g/dL    RDW 15.3 (H) 11.5 - 14.5 %    PLATELET 124 (L) 148 - 400 K/uL    NEUTROPHILS 86 (H) 32 - 75 %    LYMPHOCYTES 1 (L) 12 - 49 %    MONOCYTES 12 5 - 13 %    EOSINOPHILS 0 0 - 7 %    BASOPHILS 0 0 - 1 %    MYELOCYTES 1 (H) 0 %    ABS. NEUTROPHILS 6.3 1.8 - 8.0 K/UL    ABS. LYMPHOCYTES 0.1 (L) 0.8 - 3.5 K/UL    ABS. MONOCYTES 0.9 0.0 - 1.0 K/UL    ABS. EOSINOPHILS 0.0 0.0 - 0.4 K/UL    ABS.  BASOPHILS 0.0 0.0 - 0.1 K/UL    DF MANUAL      RBC COMMENTS ANISOCYTOSIS  1+        RBC COMMENTS MACROCYTOSIS  1+        RBC COMMENTS POLYCHROMASIA  PRESENT        RBC COMMENTS TEARDROP CELLS  PRESENT       METABOLIC PANEL, COMPREHENSIVE    Collection Time: 02/28/17 11:30 AM   Result Value Ref Range    Sodium 130 (L) 136 - 145 mmol/L    Potassium 4.3 3.5 - 5.1 mmol/L    Chloride 97 97 - 108 mmol/L    CO2 18 (L) 21 - 32 mmol/L    Anion gap 15 5 - 15 mmol/L    Glucose 259 (H) 65 - 100 mg/dL    BUN 80 (H) 6 - 20 MG/DL    Creatinine 3.11 (H) 0.70 - 1.30 MG/DL    BUN/Creatinine ratio 26 (H) 12 - 20      GFR est AA 24 (L) >60 ml/min/1.73m2    GFR est non-AA 20 (L) >60 ml/min/1.73m2    Calcium 7.7 (L) 8.5 - 10.1 MG/DL    Bilirubin, total 0.5 0.2 - 1.0 MG/DL    ALT (SGPT) 23 12 - 78 U/L    AST (SGOT) 29 15 - 37 U/L    Alk.  phosphatase 314 (H) 45 - 117 U/L    Protein, total 6.2 (L) 6.4 - 8.2 g/dL    Albumin 2.2 (L) 3.5 - 5.0 g/dL    Globulin 4.0 2.0 - 4.0 g/dL    A-G Ratio 0.6 (L) 1.1 - 2.2     PROTHROMBIN TIME + INR    Collection Time: 02/28/17 11:30 AM   Result Value Ref Range    INR 1.1 0.9 - 1.1      Prothrombin time 10.7 9.0 - 11.1 sec   PTT    Collection Time: 02/28/17 11:30 AM   Result Value Ref Range    aPTT 35.6 (H) 22.1 - 32.5 sec    aPTT, therapeutic range     58.0 - 77.0 SECS   CORTISOL    Collection Time: 02/28/17 11:30 AM   Result Value Ref Range    Cortisol, random 30.0 ug/dL   TSH 3RD GENERATION    Collection Time: 02/28/17 11:30 AM   Result Value Ref Range    TSH 1.79 0.36 - 3.74 uIU/mL   LACTIC ACID, PLASMA    Collection Time: 02/28/17  1:54 PM   Result Value Ref Range    Lactic acid 2.4 (HH) 0.4 - 2.0 MMOL/L   SODIUM, UR, RANDOM    Collection Time: 02/28/17  2:56 PM   Result Value Ref Range    Sodium urine, random 11 MMOL/L   CREATININE, UR, RANDOM    Collection Time: 02/28/17  2:56 PM   Result Value Ref Range    Creatinine, urine 150.00 mg/dL   URINALYSIS W/ REFLEX CULTURE    Collection Time: 02/28/17  2:56 PM   Result Value Ref Range    Color YELLOW/STRAW      Appearance CLEAR CLEAR      Specific gravity 1.016 1.003 - 1.030      pH (UA) 5.0 5.0 - 8.0      Protein NEGATIVE  NEG mg/dL    Glucose NEGATIVE  NEG mg/dL    Ketone NEGATIVE  NEG mg/dL    Bilirubin NEGATIVE  NEG      Blood NEGATIVE  NEG      Urobilinogen 0.2 0.2 - 1.0 EU/dL    Nitrites NEGATIVE  NEG      Leukocyte Esterase NEGATIVE  NEG      WBC 0-4 0 - 4 /hpf    RBC 0-5 0 - 5 /hpf    Epithelial cells FEW FEW /lpf    Bacteria NEGATIVE  NEG /hpf    UA:UC IF INDICATED CULTURE NOT INDICATED BY UA RESULT CNI      Hyaline cast 2-5 0 - 5 /lpf   OSMOLALITY, UR    Collection Time: 02/28/17  2:56 PM   Result Value Ref Range    Osmolality,urine 348 MOSM/kg H2O           Urine dipstick:   Lab Results   Component Value Date/Time    Color YELLOW/STRAW 02/28/2017 02:56 PM    Appearance CLEAR 02/28/2017 02:56 PM    Specific gravity 1.016 02/28/2017 02:56 PM    pH (UA) 5.0 02/28/2017 02:56 PM    Protein NEGATIVE  02/28/2017 02:56 PM    Glucose NEGATIVE  02/28/2017 02:56 PM    Ketone NEGATIVE  02/28/2017 02:56 PM    Bilirubin NEGATIVE  02/28/2017 02:56 PM    Urobilinogen 0.2 02/28/2017 02:56 PM    Nitrites NEGATIVE  02/28/2017 02:56 PM    Leukocyte Esterase NEGATIVE  02/28/2017 02:56 PM    Epithelial cells FEW 02/28/2017 02:56 PM    Bacteria NEGATIVE  02/28/2017 02:56 PM    WBC 0-4 02/28/2017 02:56 PM    RBC 0-5 02/28/2017 02:56 PM   Lachelle Singletary MD, [facility].   Office :[phone]  Fax: [phone]

## 2017-02-28 NOTE — IP AVS SNAPSHOT
Current Discharge Medication List  
  
Take these medications at their scheduled times Dose & Instructions Dispensing Information Comments Morning Noon Evening Bedtime  
 bumetanide 1 mg tablet Commonly known as:  Buelah Bake Your next dose is: Today, Tomorrow Other:  ____________ Dose:  2 mg Take 2 Tabs by mouth two (2) times a day. Resume on 3/6/17. Quantity:  60 Tab Refills:  1 CULTURELLE PROBIOTICS PO Your next dose is: Today, Tomorrow Other:  ____________ Dose:  1 Cap Take 1 Cap by mouth every evening. Refills:  0  
     
   
   
   
  
 * levothyroxine 200 mcg tablet Commonly known as:  SYNTHROID Your next dose is: Today, Tomorrow Other:  ____________ Dose:  400 mcg Take 400 mcg by mouth two (2) days a week. Takes 400 mcg Sat, Sun Refills:  0  
     
   
   
   
  
 * levothyroxine 200 mcg tablet Commonly known as:  SYNTHROID Your next dose is: Today, Tomorrow Other:  ____________ Dose:  200 mcg Take 200 mcg by mouth five (5) days a week. Daily Mon through Fri Refills:  0 LORazepam 1 mg tablet Commonly known as:  ATIVAN Your next dose is: Today, Tomorrow Other:  ____________ Dose:  1 mg Take 1 mg by mouth nightly. Refills:  0  
     
   
   
   
  
 magnesium oxide 400 mg Cap Your next dose is: Today, Tomorrow Other:  ____________ Dose:  400 mg Take 400 mg by mouth daily. (ran out) Refills:  0  
     
   
   
   
  
 * NovoLOG Mix 70-30 FlexPen 100 unit/mL (70-30) Inpn Generic drug:  insulin aspart protamine/insulin aspart Your next dose is: Today, Tomorrow Other:  ____________ Dose:  50 Units 50 Units by SubCUTAneous route Before breakfast and dinner. Refills:  0  
     
   
   
   
  
 potassium 99 mg tablet Your next dose is: Today, Tomorrow Other:  ____________ Dose:  99 mg Take 99 mg by mouth every evening. Refills:  0  
     
   
   
   
  
 sodium bicarbonate 325 mg tablet Your next dose is: Today, Tomorrow Other:  ____________ Dose:  325 mg Take 1 Tab by mouth two (2) times a day. Quantity:  60 Tab Refills:  0  
     
   
   
   
  
 VITAMIN B-12 1,000 mcg tablet Generic drug:  cyanocobalamin Your next dose is: Today, Tomorrow Other:  ____________ Dose:  1000 mcg Take 1,000 mcg by mouth daily. Refills:  0  
     
   
   
   
  
 VITAMIN D3 PO Your next dose is: Today, Tomorrow Other:  ____________ Dose:  81188 Units Take 10,000 Units by mouth daily. Refills:  0  
     
   
   
   
  
 * Notice: This list has 3 medication(s) that are the same as other medications prescribed for you. Read the directions carefully, and ask your doctor or other care provider to review them with you. Take these medications as needed Dose & Instructions Dispensing Information Comments Morning Noon Evening Bedtime * NovoLOG Mix 70-30 FlexPen 100 unit/mL (70-30) Inpn Generic drug:  insulin aspart protamine/insulin aspart Your next dose is: Today, Tomorrow Other:  ____________ Dose:  10 Units 10 Units by SubCUTAneous route nightly as needed (if BS >190 takes 10 units). Refills:  0  
     
   
   
   
  
 traMADol 50 mg tablet Commonly known as:  ULTRAM  
   
Your next dose is: Today, Tomorrow Other:  ____________ Dose:  50 mg Take 1 Tab by mouth every six (6) hours as needed. Max Daily Amount: 200 mg. Quantity:  30 Tab Refills:  0  
     
   
   
   
  
 * Notice: This list has 1 medication(s) that are the same as other medications prescribed for you. Read the directions carefully, and ask your doctor or other care provider to review them with you. Where to Get Your Medications Information about where to get these medications is not yet available ! Ask your nurse or doctor about these medications  
  bumetanide 1 mg tablet  
 sodium bicarbonate 325 mg tablet  
 traMADol 50 mg tablet

## 2017-02-28 NOTE — PROGRESS NOTES
Admission Medication Reconciliation:    Information obtained from: Patient and Rx Query    Significant PMH/Disease States:   Past Medical History:   Diagnosis Date    [facility] ([facility]     Arthritis     Cancer ([facility]     liver cancer/ cirrhosis    Cataract     Chronic diastolic heart failure ([facility] 2011    Colon polyps     COPD (chronic obstructive pulmonary disease) (HCC)     no med use    Diabetes mellitus, type 2 ([facility]     ED (erectile dysfunction)     Hyperlipidemia     Hypothyroid     Ill-defined condition     pt states he was given BP med to protect kidneys d/t diabetes    Ill-defined condition 2016    34 pound intention wt loss since early 2016    Liver disease     abnormal MRI    LVH (left ventricular hypertrophy) due to hypertensive disease     Vitamin B 12 deficiency        Chief Complaint for this Admission:    Chief Complaint   Patient presents with    Hypotension       Allergies:  Lyrica [pregabalin]    Prior to Admission Medications:   Prior to Admission Medications   Prescriptions Last Dose Informant Patient Reported? Taking? CHOLECALCIFEROL, VITAMIN D3, (VITAMIN D3 PO) 2017 at am  Yes Yes   Sig: Take 10,000 Units by mouth daily. L. RHAMNOSUS GG/INULIN (CULTURELLE PROBIOTICS PO) 2017 at Unknown time  Yes Yes   Sig: Take 1 Cap by mouth every evening. benazepril (LOTENSIN) 10 mg tablet 2017 at Unknown time  Yes Yes   Sig: Take 10 mg by mouth daily. bumetanide (BUMEX) 1 mg tablet 2017 at am  Yes Yes   Sig: Take 2 mg by mouth two (2) times a day. cyanocobalamin (VITAMIN B-12) 1,000 mcg tablet 2017 at Unknown time  Yes Yes   Sig: Take 1,000 mcg by mouth daily. insulin aspart protamine/insulin aspart (NOVOLOG MIX 70-30 FLEXPEN) 100 unit/mL (70-30) inpn 2017 at Unknown time  Yes Yes   Si Units by SubCUTAneous route Before breakfast and dinner.    insulin aspart protamine/insulin aspart (NOVOLOG MIX 70-30 FLEXPEN) 100 unit/mL (70-30) inpn   Yes Yes   Sig: 10 Units by SubCUTAneous route nightly as needed (if BS >190 takes 10 units). levothyroxine (SYNTHROID) 200 mcg tablet 2/26/2017  Yes Yes   Sig: Take 400 mcg by mouth two (2) days a week. Takes 400 mcg Sat, Sun   levothyroxine (SYNTHROID) 200 mcg tablet 2/28/2017 at Unknown time  Yes Yes   Sig: Take 200 mcg by mouth five (5) days a week. Daily Mon through Fri   lorazepam (ATIVAN) 1 mg tablet 2/27/2017 at Unknown time  Yes Yes   Sig: Take 1 mg by mouth nightly.   magnesium oxide 400 mg cap   Yes No   Sig: Take 400 mg by mouth daily. (ran out)   potassium 99 mg tablet 2/27/2017 at Unknown time  Yes Yes   Sig: Take 99 mg by mouth every evening. Facility-Administered Medications: None         Comments/Recommendations:     Spoke with patient regarding allergies and PTA medications. 1) Reviewed and confirmed allergies. 2) Updated PTA medication list. Modified timing of potassium and Culturelle. Removed ASA (pt states hasn't taken in 5 weeks), baclofen, and Norco.    Patient states he recently ran out of his magnesium.       Sherman Borden, PharmD

## 2017-02-28 NOTE — ED NOTES
Pt 20G R-AC is patent and saline locked. Site is clean, dry, and intact. PT's 20G in L-AC is infusing NS at 250 cc/hr. VSS. No distress noted or reported. Family at the bedside.  Pt in stable condition

## 2017-02-28 NOTE — ED NOTES
CONSULT NOTE:  1:33 PM Nicolas Riggs MD spoke with Dr. rAiela Weinberg, Consult for Hospitalist.  Discussed available diagnostic tests and clinical findings. He is in agreement with care plans as outlined and will admit.

## 2017-03-01 NOTE — PROGRESS NOTES
Patient has been complaining of RUQ constant aching pain 7/10 though out the day. Medicated with Tylenol 650mg with some tolerable relief. States that although pain level does decrease to a 5 or a 6 it's manageable and he doesn't want anything stronger ordered.

## 2017-03-01 NOTE — PROGRESS NOTES
Bedside shift change report given to Jade Cassidy RN (oncoming nurse) by Alysia Lockwood RN (offgoing nurse). Report included the following information SBAR, Kardex, Intake/Output and MAR.

## 2017-03-01 NOTE — PROGRESS NOTES
Chart reviewed. Pt presented to ED yesterday with hypotension after being told by doctor's office to come to ER. Pt lives with his wife Kenya Nguyen ([phone]) in a private residence in [place]. Pt is independent with ADLs and uses no DME at home. CM noted pt was on 02 provided by Flavia Chua during last admission in October 2016. Pt stated he is no longer on 02. Confirmed pt has Medicare and [facility] [employer]. Pt gets medications at [facility] on [address]. Family will transport pt via car at discharge. No needs expressed at this time. CM will follow and be available if needs arise. Care Management Interventions  PCP Verified by CM: Yes (Dr. Lonnie Bender ([phone]))  Mode of Transport at Discharge:  Other (see comment) (family)  Transition of Care Consult (CM Consult): Discharge Planning  MyChart Signup: No  Discharge Durable Medical Equipment: No  Physical Therapy Consult: Yes  Occupational Therapy Consult: Yes  Speech Therapy Consult: No  Current Support Network: Own Home, Lives with Spouse  Confirm Follow Up Transport: Family  Plan discussed with Pt/Family/Caregiver: Yes  Freedom of Choice Offered: Yes  Discharge Location  Discharge Placement:  (TBD) (based on therapy recommendations)    ADAL KovacsW/CRM

## 2017-03-01 NOTE — PROGRESS NOTES
Physical therapy informed this writer that while  patient was sitting up in chair his blood pressure did increase to 107/56.

## 2017-03-01 NOTE — PROGRESS NOTES
[facility]   [address] [address], [facility] [address], [facility]  Phone: [phone]   [phone]       Nephrology Progress Note  Luisito Manuel     1940     [id number]  Date of Admission : 2/28/2017 03/01/17    CC: Follow up for YANIV       Assessment and Plan   YANIV on CKD :  - 2/2 pre renal azotemia   - expect improvement w/ volume   - avoid crystalloids -- already has significant ascitis  - changed IVF to 5% albumin -- he is still orthostatic  - No Benazepril on d/c       CKD Stage III :  - Likely 2/2 DM,HTN  - baseline Cr ~ 1.3-1.4 prior to Cirrhosis diagnosis in October 2016   - No proteinuria   - renal US - unremarkable      Cryptogenic Cirrhosis      Extensive HCC w/ Portal vein invasion :  - s/p TARE      Hypovolemic Hyponatremia     Metabolic acidosis : 2/2 ARF- oral Bicarb for now.      Hypotension : likely 2/2 Hypovolemia       Interval History:  Seen and examined   Reports dizziness and BP drop when he walked w/ PT   UOP picking up   Metabolically better    Review of Systems: Pertinent items are noted in HPI.     Current Medications:   Current Facility-Administered Medications   Medication Dose Route Frequency    albumin human 5% (BUMINATE) solution 25 g  25 g IntraVENous Q6H    cyanocobalamin (VITAMIN B12) tablet 1,000 mcg  1,000 mcg Oral DAILY    [START ON 3/4/2017] levothyroxine (SYNTHROID) tablet 400 mcg  400 mcg Oral Once per day on Sun Sat    levothyroxine (SYNTHROID) tablet 200 mcg  200 mcg Oral Once per day on Mon Tue Wed Thu Fri    LORazepam (ATIVAN) tablet 1 mg  1 mg Oral QHS    sodium chloride (NS) flush 5-10 mL  5-10 mL IntraVENous Q8H    sodium chloride (NS) flush 5-10 mL  5-10 mL IntraVENous PRN    insulin lispro (HUMALOG) injection   SubCUTAneous AC&HS    glucose chewable tablet 16 g  4 Tab Oral PRN    dextrose (D50W) injection syrg 12.5-25 g  12.5-25 g IntraVENous PRN    glucagon (GLUCAGEN) injection 1 mg  1 mg IntraMUSCular PRN    acetaminophen (TYLENOL) tablet 650 mg  650 mg Oral Q6H PRN      Allergies   Allergen Reactions    Lyrica [Pregabalin] Hives       Objective:  Vitals:    Vitals:    03/01/17 1030 03/01/17 1034 03/01/17 1039 03/01/17 1042   BP: 121/60 98/56 (!) 84/50 104/60   Pulse: 67 78 80 68   Resp:       Temp:       SpO2:       Weight:       Height:         Intake and Output:  03/01 0701 - 03/01 1900  In: 1082.5 [I.V.:1082.5]  Out: 751 [Urine:525]  02/27 1901 - 03/01 0700  In: 500 [P.O.:500]  Out: 800 [Urine:800]    Physical Examination:    General: NAD,Conversant   Neck:  Supple, no mass  Resp:  Lungs CTA B/L, no wheezing , normal respiratory effort  CV:  RRR,  no murmur or rub,no LE edema  GI:  ascitis +. Soft, NT, + Bowel sounds, no hepatosplenomegaly  Neurologic:  Non focal  Psych:             AAO x 3 appropriate affect   Skin:  No Rash      []    High complexity decision making was performed  []    Patient is at high-risk of decompensation with multiple organ involvement    Lab Data Personally Reviewed: I have reviewed all the pertinent labs, microbiology data and radiology studies during assessment.     Recent Labs      03/01/17 0421 02/28/17   1130   NA  132*  130*   K  3.7  4.3   CL  101  97   CO2  18*  18*   GLU  151*  259*   BUN  82*  80*   CREA  2.70*  3.11*   CA  7.6*  7.7*   MG  2.1   --    PHOS  5.7*   --    ALB  2.5*  2.2*   SGOT  26  29   ALT  20  23   INR   --   1.1     Recent Labs      03/01/17   0421 02/28/17   1130   WBC  6.0  7.3   HGB  8.3*  9.3*   HCT  24.1*  27.5*   PLT  119*  145*     No results found for: SDES  Lab Results   Component Value Date/Time    Culture result: NO GROWTH AFTER 19 HOURS 02/28/2017 01:54 PM     Recent Results (from the past 24 hour(s))   LACTIC ACID, PLASMA    Collection Time: 02/28/17  1:54 PM   Result Value Ref Range    Lactic acid 2.4 (HH) 0.4 - 2.0 MMOL/L   CULTURE, BLOOD, PAIRED    Collection Time: 02/28/17  1:54 PM   Result Value Ref Range    Special Requests: NO SPECIAL REQUESTS Culture result: NO GROWTH AFTER 19 HOURS     SODIUM, UR, RANDOM    Collection Time: 02/28/17  2:56 PM   Result Value Ref Range    Sodium urine, random 11 MMOL/L   CREATININE, UR, RANDOM    Collection Time: 02/28/17  2:56 PM   Result Value Ref Range    Creatinine, urine 150.00 mg/dL   URINALYSIS W/ REFLEX CULTURE    Collection Time: 02/28/17  2:56 PM   Result Value Ref Range    Color YELLOW/STRAW      Appearance CLEAR CLEAR      Specific gravity 1.016 1.003 - 1.030      pH (UA) 5.0 5.0 - 8.0      Protein NEGATIVE  NEG mg/dL    Glucose NEGATIVE  NEG mg/dL    Ketone NEGATIVE  NEG mg/dL    Bilirubin NEGATIVE  NEG      Blood NEGATIVE  NEG      Urobilinogen 0.2 0.2 - 1.0 EU/dL    Nitrites NEGATIVE  NEG      Leukocyte Esterase NEGATIVE  NEG      WBC 0-4 0 - 4 /hpf    RBC 0-5 0 - 5 /hpf    Epithelial cells FEW FEW /lpf    Bacteria NEGATIVE  NEG /hpf    UA:UC IF INDICATED CULTURE NOT INDICATED BY UA RESULT CNI      Hyaline cast 2-5 0 - 5 /lpf   OSMOLALITY, UR    Collection Time: 02/28/17  2:56 PM   Result Value Ref Range    Osmolality,urine 348 MOSM/kg H2O   LACTIC ACID, PLASMA    Collection Time: 02/28/17  6:31 PM   Result Value Ref Range    Lactic acid 2.0 0.4 - 2.0 MMOL/L   GLUCOSE, POC    Collection Time: 02/28/17  9:23 PM   Result Value Ref Range    Glucose (POC) 262 (H) 65 - 100 mg/dL    Performed by Ashly Fajardo    CBC W/O DIFF    Collection Time: 03/01/17  4:21 AM   Result Value Ref Range    WBC 6.0 4.1 - 11.1 K/uL    RBC 2.46 (L) 4.10 - 5.70 M/uL    HGB 8.3 (L) 12.1 - 17.0 g/dL    HCT 24.1 (L) 36.6 - 50.3 %    MCV 98.0 80.0 - 99.0 FL    MCH 33.7 26.0 - 34.0 PG    MCHC 34.4 30.0 - 36.5 g/dL    RDW 15.3 (H) 11.5 - 14.5 %    PLATELET 831 (L) 307 - 219 K/uL   METABOLIC PANEL, COMPREHENSIVE    Collection Time: 03/01/17  4:21 AM   Result Value Ref Range    Sodium 132 (L) 136 - 145 mmol/L    Potassium 3.7 3.5 - 5.1 mmol/L    Chloride 101 97 - 108 mmol/L    CO2 18 (L) 21 - 32 mmol/L    Anion gap 13 5 - 15 mmol/L Glucose 151 (H) 65 - 100 mg/dL    BUN 82 (H) 6 - 20 MG/DL    Creatinine 2.70 (H) 0.70 - 1.30 MG/DL    BUN/Creatinine ratio 30 (H) 12 - 20      GFR est AA 28 (L) >60 ml/min/1.73m2    GFR est non-AA 23 (L) >60 ml/min/1.73m2    Calcium 7.6 (L) 8.5 - 10.1 MG/DL    Bilirubin, total 0.7 0.2 - 1.0 MG/DL    ALT (SGPT) 20 12 - 78 U/L    AST (SGOT) 26 15 - 37 U/L    Alk. phosphatase 253 (H) 45 - 117 U/L    Protein, total 5.8 (L) 6.4 - 8.2 g/dL    Albumin 2.5 (L) 3.5 - 5.0 g/dL    Globulin 3.3 2.0 - 4.0 g/dL    A-G Ratio 0.8 (L) 1.1 - 2.2     MAGNESIUM    Collection Time: 03/01/17  4:21 AM   Result Value Ref Range    Magnesium 2.1 1.6 - 2.4 mg/dL   PHOSPHORUS    Collection Time: 03/01/17  4:21 AM   Result Value Ref Range    Phosphorus 5.7 (H) 2.6 - 4.7 MG/DL   LACTIC ACID, PLASMA    Collection Time: 03/01/17  4:21 AM   Result Value Ref Range    Lactic acid 1.5 0.4 - 2.0 MMOL/L   GLUCOSE, POC    Collection Time: 03/01/17  6:45 AM   Result Value Ref Range    Glucose (POC) 166 (H) 65 - 100 mg/dL    Performed by [address] [place] [address], POC    Collection Time: 03/01/17 11:24 AM   Result Value Ref Range    Glucose (POC) 240 (H) 65 - 100 mg/dL    Performed by Naomi Andino      I have reviewed the flowsheets. Chart and Pertinent Notes have been reviewed. No change in PMH ,family and social history from Consult note.       Jw Manzanares MD

## 2017-03-01 NOTE — PROGRESS NOTES
Primary Nurse Yahir Roldan RN and Ronan kee RN performed a dual skin assessment on this patient. Pt with bilateral román, dusky lower extremities, with scattered bruising and small sores. No pressure ulcer noted. Bedside and Verbal shift change report given to Adrian Trejo RN  (oncoming nurse) by Yenifer Caballero RN  (offgoing nurse). Report included the following information SBAR.

## 2017-03-01 NOTE — PROGRESS NOTES
Problem: Mobility Impaired (Adult and Pediatric)  Goal: *Acute Goals and Plan of Care (Insert Text)  Physical Therapy Goals  Initiated 3/1/2017  1. Patient will move from supine to sit and sit to supine in bed with independence within 3 day(s). 2. Patient will transfer from bed to chair and chair to bed with independence using the least restrictive device within 3 day(s). 3. Patient will perform sit to stand with independence within 3 day(s). 4. Patient will ambulate with independence for 400 feet with the least restrictive device within 3 day(s). 5. Patient will ascend/descend 5 stairs with 1 handrail(s) with modified independence within 3 day(s). PHYSICAL THERAPY EVALUATION  Patient: Bertram Crabtree (18 y.o. male)  Date: 3/1/2017  Primary Diagnosis: Hypotension        Precautions:   Fall (low BP)      ASSESSMENT :  Based on the objective data described below, the patient presents with limited ambulation distance today secondary to drop in BP with standing that continued to drop while up ambulating. Sitting /60 HR 67, Standing BP 98/56 HR 78 and post ambulation standing BP84/50 HR 80. Once seated, /60 HR 68. Generally patient stable with ambulation but at risk as BP did not stabilize with time up but continued to drop. Once BP more stable anticipate patient will be safe to return home without therapy services. Patient will benefit from skilled intervention to address the above impairments.   Patients rehabilitation potential is considered to be Good  Factors which may influence rehabilitation potential include:   [ ]         None noted  [ ]         Mental ability/status  [X]         Medical condition  [ ]         Home/family situation and support systems  [ ]         Safety awareness  [ ]         Pain tolerance/management  [ ]         Other:        PLAN :  Recommendations and Planned Interventions:  [ ]           Bed Mobility Training             [ ]    Neuromuscular Re-Education  [ ] Transfer Training                   [ ]    Orthotic/Prosthetic Training  [X]           Gait Training                         [ ]    Modalities  [ ]           Therapeutic Exercises           [ ]    Edema Management/Control  [ ]           Therapeutic Activities            [ ]    Patient and Family Training/Education  [ ]           Other (comment):     Frequency/Duration: Patient will be followed by physical therapy  5 times a week to address goals. Discharge Recommendations: None and To Be Determined  Further Equipment Recommendations for Discharge: none anticipated       SUBJECTIVE:   Patient stated I was getting dizzy at home if I was up awhile.       OBJECTIVE DATA SUMMARY:   HISTORY:    Past Medical History:   Diagnosis Date    [facility] ([facility]      Arthritis      Cancer ([facility]       liver cancer/ cirrhosis    Cataract      Chronic diastolic heart failure ([facility] 11/9/2011    Colon polyps 2002    COPD (chronic obstructive pulmonary disease) (HCC)       no med use    [facility], [facility] 1995    ED (erectile dysfunction)      Hyperlipidemia      Hypothyroid      Ill-defined condition       pt states he was given BP med to protect kidneys d/t diabetes    Ill-defined condition 12/20/2016     34 pound intention wt loss since early Nov 2016    Liver disease       abnormal MRI    LVH (left ventricular hypertrophy) due to hypertensive disease      Vitamin B 12 deficiency       Past Surgical History:   Procedure Laterality Date    HX LAP CHOLECYSTECTOMY   10/13/2016     by Dr Munguia Knock     back surgery - disc    HX OTHER SURGICAL         1/2 thyroid removed as a teenager/had a ingrown goiter    HX TONSILLECTOMY         Prior Level of Function/Home Situation: Independent without an assistive device  Personal factors and/or comorbidities impacting plan of care:      Home Situation  Home Environment: Private residence  # Steps to Enter: 2  One/Two Story Residence: One story  Living Alone: No  Support Systems: Spouse/Significant Other/Partner, Child(tonia), Family member(s)  Patient Expects to be Discharged to[de-identified] Private residence  Current DME Used/Available at Home: Walker, rollator  Tub or Shower Type: Shower     EXAMINATION/PRESENTATION/DECISION MAKING:   Critical Behavior:  Neurologic State: Alert, Appropriate for age  Orientation Level: Oriented X4  Cognition: Follows commands, Appropriate safety awareness, Appropriate for age attention/concentration, Appropriate decision making  Safety/Judgement: Awareness of environment, Fall prevention, Good awareness of safety precautions, Home safety, Insight into deficits     Strength:    Strength: Generally decreased, functional      Tone & Sensation:   Tone: Normal   Sensation: Impaired (peripheral neuropathy in feet)   Range Of Motion:  AROM: Generally decreased, functional   PROM: Within functional limits   Coordination:  Coordination: Within functional limits     Functional Mobility:  Bed Mobility:   Supine to Sit: Supervision   Transfers:  Sit to Stand: Contact guard assistance  Stand to Sit: Contact guard assistance      Bed to Chair: Contact guard assistance      Balance:   Sitting: Intact  Standing: Intact  Standing - Static: Good  Standing - Dynamic : Good  Ambulation/Gait Training:  Distance (ft): 125 Feet (ft)  Assistive Device: Gait belt  Ambulation - Level of Assistance: Contact guard assistance   Gait Description (WDL): Exceptions to WDL  Gait Abnormalities: Decreased step clearance           Functional Measure:  Tinetti test:      Sitting Balance: 1  Arises: 1  Attempts to Rise: 2  Immediate Standing Balance: 2  Standing Balance: 1  Nudged: 2  Eyes Closed: 1  Turn 360 Degrees - Continuous/Discontinuous: 1  Turn 360 Degrees - Steady/Unsteady: 1  Sitting Down: 1  Balance Score: 13  Indication of Gait: 1  R Step Length/Height: 1  L Step Length/Height: 1  R Foot Clearance: 1  L Foot Clearance: 1  Step Symmetry: 1  Step Continuity: 1  Path: 2  Trunk: 2  Walking Time: 1  Gait Score: 12  Total Score: 25         Tinetti Test and G-code impairment scale:  Percentage of Impairment CH     0%    CI     1-19% CJ     20-39% CK     40-59% CL     60-79% CM     80-99% CN      100%   Tinetti  Score 0-28 28 23-27 17-22 12-16 6-11 1-5 0          Tinetti Tool Score Risk of Falls  <19 = High Fall Risk  19-24 = Moderate Fall Risk  25-28 = Low Fall Risk  Tinetti ME. Performance-Oriented Assessment of Mobility Problems in Elderly Patients. [facility]; [id number]. (Scoring Description: PT Bulletin Feb. 10, 1993)     Older adults: Jose Carson et al, 2009; n = 1000 [facility] elderly evaluated with ABC, WAYNE, ADL, and IADL)  · Mean WAYNE score for males aged 69-68 years = 26.21(3.40)  · Mean WAYNE score for females age 69-68 years = 25.16(4.30)  · Mean WAYNE score for males over 80 years = 23.29(6.02)  · Mean WAYNE score for females over 80 years = 17.20(8.32)         G codes: In compliance with CMSs Claims Based Outcome Reporting, the following G-code set was chosen for this patient based on their primary functional limitation being treated: The outcome measure chosen to determine the severity of the functional limitation was the tinetti with a score of 25/28 which was correlated with the impairment scale. · Mobility - Walking and Moving Around:               - CURRENT STATUS:    CI - 1%-19% impaired, limited or restricted               - GOAL STATUS:           CI - 1%-19% impaired, limited or restricted               - D/C STATUS:                       ---------------To be determined---------------            Pain:  Pain Scale 1: Numeric (0 - 10)  Pain Intensity 1: no complaint     Activity Tolerance:      Please refer to the flowsheet for vital signs taken during this treatment.   After treatment:   [X]         Patient left in no apparent distress sitting up in chair  [ ]         Patient left in no apparent distress in bed  [X]         Call bell left within reach  [X]         Nursing notified  [ ]         Caregiver present  [ ]         Bed alarm activated      COMMUNICATION/EDUCATION:   The patients plan of care was discussed with: Registered Nurse.  [X]         Fall prevention education was provided and the patient/caregiver indicated understanding. [ ]         Patient/family have participated as able in goal setting and plan of care. [X]         Patient/family agree to work toward stated goals and plan of care. [ ]         Patient understands intent and goals of therapy, but is neutral about his/her participation. [ ]         Patient is unable to participate in goal setting and plan of care.      Thank you for this referral.  Ronn Rizvi, PT   Time Calculation: 17 mins

## 2017-03-01 NOTE — PROGRESS NOTES
Hospitalist Progress Note  Miri Stack MD  Office: [phone]  Cell: [phone]      Date of Service:  3/1/2017  NAME:  Alonso Greenfield  :  1940  MRN:  [id number]      Admission Summary:   79 yo male with PMhx of Cryptogenic Cirrhosis, HCC s/p TARE, DM, Hypothyroidism admitted for hypotension, fatigue, dizziness. Pt recently had multiple paracentesis totaling ~20liters out, last paracentesis was 6 days PTA. Interval history / Subjective:     Pt seen and examined  Feeling better, no dizzy, still tired     Assessment & Plan:     1. YANIV on CKD 3, appears pre-renal  - c/w Albumin   - hold Diuretics and Benazepril  - Nephrology following    2. Hypotension 2/2 intravascular depletion due to large volume taps  - hydration  - monitor  - hold BP meds    3. [address] with portal vein invasion  - s/p TARE  -  consulted    4. Hyponatremia 2/2 Hypovolemia  - monitor    5. Metabolic Acidosis 2/2 ARF  - monitor    6. Thrombocytopenia 2/2 Cirrhosis  - monitor     Code status: FULL  DVT prophylaxis: scds    Care Plan discussed with: Patient/Family and Nurse  Disposition: Home w/Family and HH PT, OT, RN     Hospital Problems  Date Reviewed: 2017          Codes Class Noted POA    * (Principal)Hypotension ICD-10-CM: I95.9  ICD-9-CM: 458.9  2017 Yes        YANIV (acute kidney injury) [facility]) ICD-10-CM: N17.9  ICD-9-CM: 584.9  2017 Yes        Cirrhosis ([facility] [address] ICD-10-CM: K74.60  ICD-9-CM: 571.5  10/30/2016 Yes        Diabetes mellitus, type 2 ([facility] [address] ICD-10-CM: E11.9  ICD-9-CM: 250.00  Unknown Yes                Review of Systems:   A comprehensive review of systems was negative except for that written in the HPI. Vital Signs:    Last 24hrs VS reviewed since prior progress note.  Most recent are:  Visit Vitals    BP (!) 85/46 (BP 1 Location: Right arm)    Pulse 76    Temp 97.9 °F (36.6 °C)    Resp 16    Ht 6' 2\" (1.88 m)    Wt 126.5 kg (278 lb 14.4 oz)    SpO2 97%    BMI 35.81 kg/m2         Intake/Output Summary (Last 24 hours) at 03/01/17 0940  Last data filed at 03/01/17 6699   Gross per 24 hour   Intake              500 ml   Output             1200 ml   Net             -700 ml        Physical Examination:             Constitutional:  No acute distress, cooperative, pleasant    ENT:  Oral mucous moist, oropharynx benign. Neck supple,    Resp:  CTA bilaterally. No wheezing/rhonchi/rales. No accessory muscle use   CV:  Regular rhythm, normal rate, no murmurs, gallops, rubs    GI:  Soft, non distended, non tender. normoactive bowel sounds, no hepatosplenomegaly     Musculoskeletal:  1+ edema b/l LE    Neurologic:  Moves all extremities. AAOx3, CN II-XII reviewed     Psych:  Good insight, Not anxious nor agitated. Data Review:    Review and/or order of clinical lab test  Review and/or order of tests in the radiology section of CPT  Review and/or order of tests in the medicine section of CPT      Labs:     Recent Labs      03/01/17   0421 02/28/17   1130   WBC  6.0  7.3   HGB  8.3*  9.3*   HCT  24.1*  27.5*   PLT  119*  145*     Recent Labs      03/01/17   0421 02/28/17   1130   NA  132*  130*   K  3.7  4.3   CL  101  97   CO2  18*  18*   BUN  82*  80*   CREA  2.70*  3.11*   GLU  151*  259*   CA  7.6*  7.7*   MG  2.1   --    PHOS  5.7*   --      Recent Labs      03/01/17   0421 02/28/17   1130   SGOT  26  29   ALT  20  23   AP  253*  314*   TBILI  0.7  0.5   TP  5.8*  6.2*   ALB  2.5*  2.2*   GLOB  3.3  4.0     Recent Labs      02/28/17   1130   INR  1.1   PTP  10.7   APTT  35.6*      No results for input(s): FE, TIBC, PSAT, FERR in the last 72 hours. No results found for: FOL, RBCF   No results for input(s): PH, PCO2, PO2 in the last 72 hours. No results for input(s): CPK, CKNDX, TROIQ in the last 72 hours.     No lab exists for component: CPKMB  No results found for: CHOL, CHOLX, CHLST, CHOLV, HDL, LDL, DLDL, LDLC, DLDLP, TGL, Rayshawn Real, [facility], [facility]  Lab Results   Component Value Date/Time    Glucose (POC) 166 03/01/2017 06:45 AM    Glucose (POC) 262 02/28/2017 09:23 PM    Glucose (POC) 85 02/02/2017 12:20 PM    Glucose (POC) 143 12/22/2016 12:30 PM    Glucose (POC) 264 10/14/2016 04:38 PM     Lab Results   Component Value Date/Time    Color YELLOW/STRAW 02/28/2017 02:56 PM    Appearance CLEAR 02/28/2017 02:56 PM    Specific gravity 1.016 02/28/2017 02:56 PM    pH (UA) 5.0 02/28/2017 02:56 PM    Protein NEGATIVE  02/28/2017 02:56 PM    Glucose NEGATIVE  02/28/2017 02:56 PM    Ketone NEGATIVE  02/28/2017 02:56 PM    Bilirubin NEGATIVE  02/28/2017 02:56 PM    Urobilinogen 0.2 02/28/2017 02:56 PM    Nitrites NEGATIVE  02/28/2017 02:56 PM    Leukocyte Esterase NEGATIVE  02/28/2017 02:56 PM    Epithelial cells FEW 02/28/2017 02:56 PM    Bacteria NEGATIVE  02/28/2017 02:56 PM    WBC 0-4 02/28/2017 02:56 PM    RBC 0-5 02/28/2017 02:56 PM         Medications Reviewed:     Current Facility-Administered Medications   Medication Dose Route Frequency    albumin human 25% (BUMINATE) solution 25 g  25 g IntraVENous Q6H    cyanocobalamin (VITAMIN B12) tablet 1,000 mcg  1,000 mcg Oral DAILY    [START ON 3/4/2017] levothyroxine (SYNTHROID) tablet 400 mcg  400 mcg Oral Once per day on Sun Sat    levothyroxine (SYNTHROID) tablet 200 mcg  200 mcg Oral Once per day on Mon Tue Wed Thu Fri    LORazepam (ATIVAN) tablet 1 mg  1 mg Oral QHS    sodium chloride (NS) flush 5-10 mL  5-10 mL IntraVENous Q8H    sodium chloride (NS) flush 5-10 mL  5-10 mL IntraVENous PRN    0.9% sodium chloride infusion  75 mL/hr IntraVENous CONTINUOUS    insulin lispro (HUMALOG) injection   SubCUTAneous AC&HS    glucose chewable tablet 16 g  4 Tab Oral PRN    dextrose (D50W) injection syrg 12.5-25 g  12.5-25 g IntraVENous PRN    glucagon (GLUCAGEN) injection 1 mg  1 mg IntraMUSCular PRN    acetaminophen (TYLENOL) tablet 650 mg  650 mg Oral Q6H PRN ______________________________________________________________________  EXPECTED LENGTH OF STAY: - - -  ACTUAL LENGTH OF STAY:          1                 Priyanka Maria MD

## 2017-03-01 NOTE — PROGRESS NOTES
Problem: Self Care Deficits Care Plan (Adult)  Goal: *Acute Goals and Plan of Care (Insert Text)  Occupational Therapy Goals  Initiated 3/1/2017  1. Patient will perform grooming standing with no LOB with independence within 7 day(s). 2. Patient will perform bathing with modified independence within 7 day(s). 3. Patient will perform upper body dressing and lower body dressing with modified independence within 7 day(s). 4. Patient will perform toilet transfers with modified independence within 7 day(s). 5. Patient will perform all aspects of toileting with modified independence within 7 day(s). 6. Patient will participate in upper extremity therapeutic exercise/activities with modified independence for 10 minutes within 7 day(s). 7. Patient will utilize energy conservation techniques during functional activities with verbal cues within 7 day(s). OCCUPATIONAL THERAPY EVALUATION  Patient: Teddy Jacobo (24 y.o. male)  Date: 3/1/2017  Primary Diagnosis: Hypotension        Precautions:   Fall (low BP)      ASSESSMENT :  Based on the objective data described below, the patient presents with low BP, however, pt asymptomatic, generally decreased activity tolerance and standing balance, limiting his indepednence and safety with ADL routine. Pt is overall IND to [place] for ADLs, primarily in standing. Pt BP low (see vitals for details), however, pt asymptomatic with activity. Pt reports he is IND at baseline, drives, lives with wife. Educated pt on importnace of OOB activity and completing ADLs with supervision from nursing. Suggestions for next session include standing ADLs and bathroom mobility. Anticipate no OT needs at discharge. Patient will benefit from skilled intervention to address the above impairments.   Patients rehabilitation potential is considered to be Good  Factors which may influence rehabilitation potential include:   [ ]             None noted  [ ]             Mental ability/status  [ ] Medical condition  [ ]             Home/family situation and support systems  [ ]             Safety awareness  [ ]             Pain tolerance/management  [X]             Other: BP       PLAN :  Recommendations and Planned Interventions:  [X]               Self Care Training                  [X]        Therapeutic Activities  [X]               Functional Mobility Training    [ ]        Cognitive Retraining  [X]               Therapeutic Exercises           [X]        Endurance Activities  [X]               Balance Training                   [ ]        Neuromuscular Re-Education  [ ]               Visual/Perceptual Training     [X]   Home Safety Training  [X]               Patient Education                 [X]        Family Training/Education  [ ]               Other (comment):     Frequency/Duration: Patient will be followed by occupational therapy 3 times a week to address goals. Discharge Recommendations: Home Health and None  Further Equipment Recommendations for Discharge: none at this time       Patient will benefit from skilled intervention to address the above impairments. Patients rehabilitation potential is considered to be Good  Factors which may influence rehabilitation potential include:   [X]             None noted  [ ]             Mental ability/status  [ ]             Medical condition  [ ]             Home/family situation and support systems  [ ]             Safety awareness  [ ]             Pain tolerance/management  [X]             Other:    Bren Goldberg stated I'm not dizzy.       OBJECTIVE DATA SUMMARY:   HISTORY:   Past Medical History:   Diagnosis Date    Acute diastolic [facility] failure ([facility]      Arthritis      Cancer ([facility] [address]       liver cancer/ cirrhosis    Cataract      Chronic diastolic heart failure ([facility] [address] 11/9/2011    Colon polyps 2002    COPD (chronic obstructive pulmonary disease) (HCC)       no med use    Diabetes mellitus, type 2 ([facility] [address] (erectile dysfunction)      Hyperlipidemia      Hypothyroid      Ill-defined condition       pt states he was given BP med to protect kidneys d/t diabetes    Ill-defined condition 12/20/2016     34 pound intention wt loss since early Nov 2016    Liver disease       abnormal MRI    LVH (left ventricular hypertrophy) due to hypertensive disease      Vitamin B 12 deficiency       Past Surgical History:   Procedure Laterality Date    HX LAP CHOLECYSTECTOMY   10/13/2016     by Dr Jose Crane     back surgery - disc    HX OTHER SURGICAL         1/2 thyroid removed as a teenager/had a ingrown goiter    HX TONSILLECTOMY            Prior Level of Function/Home Situation: Pt lives with wife, IND  Expanded or extensive additional review of patient history:      Home Situation  Home Environment: Private residence  # Steps to Enter: 2  One/Two Story Residence: One story  Living Alone: No  Support Systems: Spouse/Significant Other/Partner, Child(tonia), Family member(s)  Patient Expects to be Discharged to[de-identified] Private residence  Current DME Used/Available at Home: Octavia Hylan, rollator  Tub or Shower Type: Shower  [X]  Right hand dominant             [ ]  Left hand dominant     EXAMINATION OF PERFORMANCE DEFICITS:  Cognitive/Behavioral Status:  Neurologic State: Alert; Appropriate for age  Orientation Level: Oriented X4  Cognition: Follows commands; Appropriate safety awareness; Appropriate for age attention/concentration; Appropriate decision making  Perception: Appears intact  Perseveration: No perseveration noted  Safety/Judgement: Awareness of environment; Fall prevention;Good awareness of safety precautions; Home safety; Insight into deficits  Skin: appears intact  Edema: none noted in BUEs  Vision/Perceptual:    Tracking: Able to track stimulus in all quadrants w/o difficulty                 Diplopia: No            Range of Motion:     AROM: Generally decreased, functional  PROM: Within functional limits Strength:     Strength: Generally decreased, functional              Coordination:  Coordination: Within functional limits  Fine Motor Skills-Upper: Left Intact; Right Intact    Gross Motor Skills-Upper: Left Intact; Right Intact  Tone & Sensation:     Tone: Normal  Sensation: Impaired (peripheral neuropathy in feet)                       Balance:  Sitting: Intact  Standing: Impaired  Standing - Static: Good  Standing - Dynamic : Fair     Functional Mobility and Transfers for ADLs:  Bed Mobility:  Supine to Sit: Supervision     Transfers:  Sit to Stand: Contact guard assistance  Stand to Sit: Contact guard assistance  Bed to Chair: Contact guard assistance  Toilet Transfer : Contact guard assistance     ADL Assessment:  Feeding: Independent     Oral Facial Hygiene/Grooming: Independent     Bathing: Contact guard assistance; Adaptive equipment; Additional time;Assist x1     Upper Body Dressing: Setup     Lower Body Dressing: Contact guard assistance; Adaptive equipment; Additional time;Assist x1     Toileting: Contact guard assistance; Adaptive equipment; Additional time;Assist x1                 ADL Intervention and task modifications:                                            Cognitive Retraining  Safety/Judgement: Awareness of environment; Fall prevention;Good awareness of safety precautions; Home safety; Insight into deficits        Functional Measure:  Barthel Index:      Bathin  Bladder: 10  Bowels: 10  Groomin  Dressin  Feeding: 10  Mobility: 10  Stairs: 0  Toilet Use: 5  Transfer (Bed to Chair and Back): 10  Total: 65         Barthel and G-code impairment scale:  Percentage of impairment CH  0% CI  1-19% CJ  20-39% CK  40-59% CL  60-79% CM  80-99% CN  100%   Barthel Score 0-100 100 99-80 79-60 59-40 20-39 1-19    0   Barthel Score 0-20 20 17-19 13-16 9-12 5-8 1-4 0      The Barthel ADL Index: Guidelines  1.  The index should be used as a record of what a patient does, not as a record of what a patient could do. 2. The main aim is to establish degree of independence from any help, physical or verbal, however minor and for whatever reason. 3. The need for supervision renders the patient not independent. 4. A patient's performance should be established using the best available evidence. Asking the patient, friends/relatives and nurses are the usual sources, but direct observation and common sense are also important. However direct testing is not needed. 5. Usually the patient's performance over the preceding 24-48 hours is important, but occasionally longer periods will be relevant. 6. Middle categories imply that the patient supplies over 50 per cent of the effort. 7. Use of aids to be independent is allowed. Shakira Olmos., Barthel, DFredaW. (9025). Functional evaluation: the Barthel Index. [address] [facility] (14)2. Dangelo Olivo sidney GENOVEVA Paul, Hillary Franco., Stephanie Pina., [facility], [address] [place] [address] (1999). Measuring the change indisability after inpatient rehabilitation; comparison of the responsiveness of the Barthel Index and Functional [place] Measure. Journal of Neurology, Neurosurgery, and Psychiatry, 66(4), 747-221. Shin Arms, N.[place].A, JOURDAN Quesada.RILEY, & Katie Romo, M.A. (2004.) Assessment of post-stroke quality of life in cost-effectiveness studies: The usefulness of the Barthel Index and the EuroQoL-5D. Quality of Life Research, 13, 493-19            G codes: In compliance with CMSs Claims Based Outcome Reporting, the following G-code set was chosen for this patient based on their primary functional limitation being treated: The outcome measure chosen to determine the severity of the functional limitation was the barthel index with a score of 65/100 which was correlated with the impairment scale.       · Self Care:               - CURRENT STATUS:    CJ - 20%-39% impaired, limited or restricted               - GOAL STATUS:           CI - 1%-19% impaired, limited or restricted  - D/C STATUS:                       ---------------To be determined---------------      Occupational Therapy Evaluation Charge Determination   History Examination Decision-Making   LOW Complexity : Brief history review  LOW Complexity : 1-3 performance deficits relating to physical, cognitive , or psychosocial skils that result in activity limitations and / or participation restrictions  LOW Complexity : No comorbidities that affect functional and no verbal or physical assistance needed to complete eval tasks       Based on the above components, the patient evaluation is determined to be of the following complexity level: LOW   Activity Tolerance:   VSS, low BP     Please refer to the flowsheet for vital signs taken during this treatment. After treatment:   [X] Patient left in no apparent distress sitting up in chair  [ ] Patient left in no apparent distress in bed  [X] Call bell left within reach  [X] Nursing notified  [X] Caregiver present  [ ] Bed alarm activated      COMMUNICATION/EDUCATION:   The patients plan of care was discussed with: Physical Therapist and Registered Nurse.  [X] Home safety education was provided and the patient/caregiver indicated understanding. [X] Patient/family have participated as able in goal setting and plan of care. [X] Patient/family agree to work toward stated goals and plan of care. [ ] Patient understands intent and goals of therapy, but is neutral about his/her participation. [ ] Patient is unable to participate in goal setting and plan of care. This patients plan of care is appropriate for delegation to [facility].      Thank you for this referral.  Jane Moran OT  Time Calculation: 16 mins

## 2017-03-02 NOTE — PROGRESS NOTES
Bedside shift change report given to Mary Garibay RN (oncoming nurse) by Jacquelyn Garcia RN (offgoing nurse). Report included the following information SBAR, Kardex, Intake/Output and MAR.

## 2017-03-02 NOTE — PROGRESS NOTES
Hospitalist Progress Note  Gary Liu MD  Office: [phone]  Cell: [phone]      Date of Service:  3/2/2017  NAME:  Yane Hoffmann  :  1940  MRN:  [id number]      Admission Summary:   79 yo male with PMhx of Cryptogenic Cirrhosis, HCC s/p TARE, DM, Hypothyroidism admitted for hypotension, fatigue, dizziness. Pt recently had multiple paracentesis totaling ~20liters out, last paracentesis was 6 days PTA. Interval history / Subjective:     Pt seen and examined  Dizziness improved  Today having abdominal discomfort and fullness     Assessment & Plan:     1. YANIV on CKD 3, appears pre-renal  - c/w Albumin   - hold Diuretics  - No benazepril at discharge  - Nephrology following    2. Hypotension 2/2 intravascular depletion due to large volume taps  - improved    3. [facility] with portal vein invasion  - s/p TARE  -  consulted  - Repeat MRI abd today    4. Hyponatremia 2/2 Hypovolemia  - improved    5. Metabolic Acidosis 2/2 ARF  - monitor    6. Thrombocytopenia 2/2 Cirrhosis  - monitor     7. Ascites 2/2 HCC and Cryptogenic Cirrhosis  - Plan for US guided paracentesis today  - c/w albumin    Code status: FULL  DVT prophylaxis: scds    Care Plan discussed with: Patient/Family and Nurse  Disposition: Home w/Family and HH PT, OT, RN     Hospital Problems  Date Reviewed: 2017          Codes Class Noted POA    * (Principal)Hypotension ICD-10-CM: I95.9  ICD-9-CM: 458.9  2017 Yes        YANIV (acute kidney injury) [facility]) ICD-10-CM: N17.9  ICD-9-CM: 584.9  2017 Yes        Cirrhosis ([facility] ICD-10-CM: K74.60  ICD-9-CM: 571.5  10/30/2016 Yes        Diabetes mellitus, type 2 ([facility] ICD-10-CM: E11.9  ICD-9-CM: 250.00  Unknown Yes                Review of Systems:   A comprehensive review of systems was negative except for that written in the HPI. Vital Signs:    Last 24hrs VS reviewed since prior progress note.  Most recent are:  Visit Vitals    /60 (BP Patient Position: Sitting)    Pulse 73    Temp 97.7 °F (36.5 °C)    Resp 20    Ht 6' 2\" (1.88 m)    Wt 128.8 kg (284 lb)    SpO2 93%    BMI 36.46 kg/m2         Intake/Output Summary (Last 24 hours) at 03/02/17 1015  Last data filed at 03/02/17 4693   Gross per 24 hour   Intake             1580 ml   Output             1550 ml   Net               30 ml        Physical Examination:             Constitutional:  No acute distress, cooperative, pleasant    ENT:  Oral mucous moist, oropharynx benign. Neck supple,    Resp:  CTA bilaterally. No wheezing/rhonchi/rales. No accessory muscle use   CV:  Regular rhythm, normal rate, no murmurs, gallops, rubs    GI:  Soft, distended, dull to percussions, +ascites      Musculoskeletal:  2+ edema b/l LE    Neurologic:  Moves all extremities. AAOx3, CN II-XII reviewed     Psych:  Good insight, Not anxious nor agitated. Data Review:    Review and/or order of clinical lab test  Review and/or order of tests in the radiology section of CPT  Review and/or order of tests in the medicine section of CPT      Labs:     Recent Labs      03/01/17 0421 02/28/17   1130   WBC  6.0  7.3   HGB  8.3*  9.3*   HCT  24.1*  27.5*   PLT  119*  145*     Recent Labs      03/02/17   0314  03/01/17 0421 02/28/17   1130   NA  135*  132*  130*   K  3.7  3.7  4.3   CL  103  101  97   CO2  18*  18*  18*   BUN  77*  82*  80*   CREA  2.26*  2.70*  3.11*   GLU  116*  151*  259*   CA  7.8*  7.6*  7.7*   MG  2.1  2.1   --    PHOS  4.7  5.7*   --      Recent Labs      03/01/17 0421 02/28/17   1130   SGOT  26  29   ALT  20  23   AP  253*  314*   TBILI  0.7  0.5   TP  5.8*  6.2*   ALB  2.5*  2.2*   GLOB  3.3  4.0     Recent Labs      02/28/17   1130   INR  1.1   PTP  10.7   APTT  35.6*      No results for input(s): FE, TIBC, PSAT, FERR in the last 72 hours. No results found for: FOL, RBCF   No results for input(s): PH, PCO2, PO2 in the last 72 hours.   No results for input(s): CPK, CKNDX, TROIQ in the last 72 hours.     No lab exists for component: CPKMB  No results found for: CHOL, CHOLX, CHLST, CHOLV, HDL, LDL, DLDL, LDLC, DLDLP, TGL, TGLX, TRIGL, TRIGP, CHHD, CHHDX  Lab Results   Component Value Date/Time    Glucose (POC) 115 03/02/2017 06:29 AM    Glucose (POC) 185 03/01/2017 10:15 PM    Glucose (POC) 332 03/01/2017 04:43 PM    Glucose (POC) 240 03/01/2017 11:24 AM    Glucose (POC) 166 03/01/2017 06:45 AM     Lab Results   Component Value Date/Time    Color YELLOW/STRAW 02/28/2017 02:56 PM    Appearance CLEAR 02/28/2017 02:56 PM    Specific gravity 1.016 02/28/2017 02:56 PM    pH (UA) 5.0 02/28/2017 02:56 PM    Protein NEGATIVE  02/28/2017 02:56 PM    Glucose NEGATIVE  02/28/2017 02:56 PM    Ketone NEGATIVE  02/28/2017 02:56 PM    Bilirubin NEGATIVE  02/28/2017 02:56 PM    Urobilinogen 0.2 02/28/2017 02:56 PM    Nitrites NEGATIVE  02/28/2017 02:56 PM    Leukocyte Esterase NEGATIVE  02/28/2017 02:56 PM    Epithelial cells FEW 02/28/2017 02:56 PM    Bacteria NEGATIVE  02/28/2017 02:56 PM    WBC 0-4 02/28/2017 02:56 PM    RBC 0-5 02/28/2017 02:56 PM         Medications Reviewed:     Current Facility-Administered Medications   Medication Dose Route Frequency    sodium bicarbonate tablet 325 mg  325 mg Oral BID    insulin lispro (HUMALOG) injection   SubCUTAneous AC&HS    insulin lispro protamine/insulin lispro (HUMALOG MIX 75/25) injection 50 Units  50 Units SubCUTAneous ACB&D    cyanocobalamin (VITAMIN B12) tablet 1,000 mcg  1,000 mcg Oral DAILY    [START ON 3/4/2017] levothyroxine (SYNTHROID) tablet 400 mcg  400 mcg Oral Once per day on Sun Sat    levothyroxine (SYNTHROID) tablet 200 mcg  200 mcg Oral Once per day on Mon Tue Wed Thu Fri    LORazepam (ATIVAN) tablet 1 mg  1 mg Oral QHS    sodium chloride (NS) flush 5-10 mL  5-10 mL IntraVENous Q8H    sodium chloride (NS) flush 5-10 mL  5-10 mL IntraVENous PRN    glucose chewable tablet 16 g  4 Tab Oral PRN  dextrose (D50W) injection syrg 12.5-25 g  12.5-25 g IntraVENous PRN    glucagon (GLUCAGEN) injection 1 mg  1 mg IntraMUSCular PRN    acetaminophen (TYLENOL) tablet 650 mg  650 mg Oral Q6H PRN     ______________________________________________________________________  EXPECTED LENGTH OF STAY: 3d 9h  ACTUAL LENGTH OF STAY:          2                 Ronnie Meng MD

## 2017-03-02 NOTE — PROGRESS NOTES
Bedside shift change report given to Merlyn (oncoming nurse) by Whitney Mims RN (offgoing nurse). Report included the following information SBAR, Kardex, Intake/Output, MAR and Recent Results.

## 2017-03-02 NOTE — DIABETES MGMT
DTC Progress Note    Recommendations/ Comments: Chart reviewed secondary to hyperglycemia yesterday. Noted change in correction scale lispro to Insulin resistant scale and addition of the 75/25 bid. Please consider secondary to his renal status changing the  Corrections scale to High sensitivity scale. Will continue to follow. BG improved today. Chart reviewed on Lelo Fung. Patient is a 78 y.o. male with history Type 2 Diabetes on insulin injections: Novolog 70/30 insulin 50 untis bid and if Bg >190 10 units at HS at home. A1c:   No results found for: HBA1C, HGBE8, FVZ2FCVH    Recent Glucose Results: Lab Results   Component Value Date/Time     (H) 03/02/2017 03:14 AM    GLUCPOC 88 03/02/2017 12:48 PM    GLUCPOC 115 (H) 03/02/2017 06:29 AM    GLUCPOC 185 (H) 03/01/2017 10:15 PM        Lab Results   Component Value Date/Time    Creatinine 2.26 03/02/2017 03:14 AM       Active Orders   Diet    DIET RENAL WITH OPTIONS 70-70-70 (House); Regular        PO intake: Patient Vitals for the past 72 hrs:   % Diet Eaten   03/02/17 0835 100 %   03/01/17 1748 100 %   03/01/17 1346 50 %   03/01/17 0944 50 %   02/28/17 1802 100 %       Current hospital DM medication: 50 units 75/25 Premixed lispro bid and Lispro Correctional insulin with \"insulin resistant\" sensitivity    Will continue to follow as needed.     Thank you  Johan Chand RD,CDE   [address]

## 2017-03-02 NOTE — DIABETES MGMT
DTC Progress Note    Recommendations/ Comments: Chart reviewed on [facility] due to hyperglycemia. Noted Humalog 75/25 added yesterday. If appropriate, consider:  - Changing correction scale to high sensitivity scale to prevent hypoglycemia due to elevated creatinine    Patient is a 78 y.o. male with hx Type 2 Diabetes on Novolog 70/30 at home. A1c:   No results found for: HBA1C, HGBE8, LDL0VXHZ    Recent Glucose Results: Lab Results   Component Value Date/Time     (H) 03/02/2017 03:14 AM    GLUCPOC 88 03/02/2017 12:48 PM    GLUCPOC 115 (H) 03/02/2017 06:29 AM    GLUCPOC 185 (H) 03/01/2017 10:15 PM        Lab Results   Component Value Date/Time    Creatinine 2.26 03/02/2017 03:14 AM       Active Orders   Diet    DIET RENAL WITH OPTIONS 70-70-70 (House); Regular        PO intake: Patient Vitals for the past 72 hrs:   % Diet Eaten   03/02/17 0835 100 %   03/01/17 1748 100 %   03/01/17 1346 50 %   03/01/17 0944 50 %   02/28/17 1802 100 %       Current hospital DM medication: Humalog 75/25 50 units BID and Lispro insulin resistant scale    Will continue to follow as needed.     Thank you  Orlando Martinez RD

## 2017-03-02 NOTE — PROGRESS NOTES
Patient went down to ultrasound today and had a paracentesis done. RUQ drain was placed. 7600cc drained thus far. Blood pressure did drop to 99/46 asymptomatic. Albumin 25% infusing at this time. Dr. Elías Garcia was paged and given an update by this writer. New orders received to clamp off tubing for 4hrs and then recheck BP. If BP is WNL unclamp tubing and allow it to drain. Will continue to monitor. Dr. Elías Garcia also gave a verbal order to cancel patient's MRI this evening due to abnormal renal labs and the fact that contrast may be used.

## 2017-03-02 NOTE — PROGRESS NOTES
Bedside shift change report given to Minnie Junior (oncoming nurse) by Janelle Elizalde (offgoing nurse). Report included the following information SBAR.

## 2017-03-02 NOTE — PROGRESS NOTES
Spiritual Care Partner Volunteer visited patient in Surgical Unit on 03/02/17  Documented by:    Aurora Veloz M.S. MFredaDiv.   [address] ([id number])

## 2017-03-02 NOTE — CONSULTS
[address], MD, FACP, [facility]     April Lenora Sosa, ISRRAEL Munoz MD, MD Ileana Wray NP Regina Grow, STEPHANIE        [address] [facility], [address] [address]     [phone]     FAX: [address] [facility], [address] [facility]     [phone]     FAX: [phone]         HEPATOLOGY CONSULT NOTE  The patient is well known to me and regularly cared for at [address] [address]. 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 He was found to have cirrhosis and liver cancer in 10/1976 when he underwent cholecystectomy and then underwent imaging. [address] involves the left lobe, right lonbe and there is invasion of the portal vein. AFP was 12,000. He was treated with Y-90 TARE to the larger right lobe [address] in 1/2017. EGD has demonstrated small esophageal varices which did not require banding. He has not developed ascites and has mild edema which resolved without diuretics. He has not developed HE. He was hospitalized a few days ago when he saw Dr Blanca Michel because of weakness and hypotension. He was sent to the ED and found to have YANIV with creatinine of 3.11 mg. He had a lot of ascites on admission. He has undergone paracentesis and is still draining ascites. THis is improving with hydration. He could go home when Screat is back to baseline. ASSESSMENT AND PLAN:  Cryptogenic cirrhosis  The etiology is most likely MATIAS. The current CTP score is 6, Child class A, MELD score 27. THis is all driven by the YANIV and will imorove as renal function improves. Ascites  This is from cirrhosis. Doubt it is malignant. Paracentesis perfomred this AM.  Will remove catheter in AM.  Holding diuretics because of YANIV.     [address]  He has stage 4 [address] with vascualr invation. We have treated this with a single dose of Y-90 TARE. We have offered Nexavar but he declined. He is due for MRI to assess response to Y-90 TACE. Will get this during this hospitalization, just prior to DC, after Screat is normal so we can give IV contrast.  There is no benefit in performing the MRI without IV contrast.  Will wait and do this just prior to DC after renal function is back to baseline. Will decide if he is a candidate for additonal treatment after we seen the MRI. YANIV  Will treat with IV albumin 25 gm Q6H. He is responding well and Screat continues to come down daily. He was not on diuretics prior to the hospitalization. He has not developed edema or ascites. SYSTEM REVIEW:  Constitution systems: Negative for fever, chills, weight gain, weight loss. Eyes: Negative for visual changes. ENT: Negative for sore throat, painful swallowing. Respiratory: Negative for cough, hemoptysis, SOB. Cardiology: Negative for chest pain, palpitations. GI:  Negative for constipation or diarrhea. : Negative for urinary frequency, dysuria, hematuria, nocturia. Skin: Negative for rash. Hematology: Negative for easy bruising, blood clots. Musculo-skelatal: Negative for back pain, muscle pain, weakness. Neurologic: Negative for headaches, dizziness, vertigo, memory problems not related to HE. Psychology: Negative for anxiety, depression. FAMILY HISTORY:  The father  of MI. The mother  of brain tumor. There is no family history of liver disease.      SOCIAL HISTORY:  The patient is . The patient has 2 children, and 2 grandchildren. The patient stopped using tobacco products in 10/2011. The patient has previously consumed alcohol on rare social occasions never in excess. 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 PHYSICAL EXAMINATION:  VS: per nursing note  General:  No acute distress. Eyes:  Sclera anicteric. ENT:  No oral lesions. Thyroid normal.  Nodes:  No adenopathy. Skin:  Spider angiomata. No jaundice. Respiratory:  Lungs clear to auscultation. Cardiovascular:  Regular heart rate. Abdomen:  Soft non-tender, No obvious ascites. Extremities:  No lower extremity edema. Neurologic:  Alert and oriented. Lethargic. Confused. Cranial nerves grossly intact. No asterixis. LABORATORY:  Results for Nancy Pappas (MRN [id number]) as of 3/2/2017 18:24   Ref. Range 2/6/2017 10:40 2/28/2017 11:30 3/1/2017 04:21 3/2/2017 03:14   WBC Latest Ref Range: 4.1 - 11.1 K/uL 10.5 7.3 6.0    HGB Latest Ref Range: 12.1 - 17.0 g/dL 10.9 (L) 9.3 (L) 8.3 (L)    PLATELET Latest Ref Range: 150 - 400 K/uL 180 145 (L) 119 (L)    INR Latest Ref Range: 0.9 - 1.1   1.1 1.1     Sodium Latest Ref Range: 136 - 145 mmol/L 134 130 (L) 132 (L) 135 (L)   Potassium Latest Ref Range: 3.5 - 5.1 mmol/L 4.2 4.3 3.7 3.7   Chloride Latest Ref Range: 97 - 108 mmol/L 95 (L) 97 101 103   CO2 Latest Ref Range: 21 - 32 mmol/L 22 18 (L) 18 (L) 18 (L)   Glucose Latest Ref Range: 65 - 100 mg/dL 233 (H) 259 (H) 151 (H) 116 (H)   BUN Latest Ref Range: 6 - 20 MG/DL 35 (H) 80 (H) 82 (H) 77 (H)   Creatinine Latest Ref Range: 0.70 - 1.30 MG/DL 1.95 (H) 3.11 (H) 2.70 (H) 2.26 (H)   Bilirubin, total Latest Ref Range: 0.2 - 1.0 MG/DL 0.6 0.5 0.7    Albumin Latest Ref Range: 3.5 - 5.0 g/dL 2.7 (L) 2.2 (L) 2.5 (L)    ALT Latest Ref Range: 12 - 78 U/L 34 23 20    AST Latest Ref Range: 15 - 37 U/L 65 (H) 29 26    Alk.  phosphatase Latest Ref Range: 45 - 117 U/L 378 (H) 314 (H) 253 (H)        Shabnam Terry MD  Liver [address] [address] Daniela Gonzales [address]  Levine, Susan. \[facility]ospital [facility] New [facility] and Outlook.\""[facility].  [phone]

## 2017-03-02 NOTE — PROGRESS NOTES
[facility]   [address] [facility], [facility] [address], [facility]  Phone: [phone]   [phone]       Nephrology Progress Note  Maycol Stoll     1940     [id number]  Date of Admission : 2/28/2017 03/02/17    CC: Follow up for YANIV       Assessment and Plan   YANIV on CKD :  - 2/2 pre renal azotemia   - improving w/ volume expansion   - continue IV albumin w/ planned Paracentesis -- suggest 10 gm IV albumin/ Liter of Para  - ok for d/c home tomorrow on bumex and no benazepril    CKD Stage III :  - Likely 2/2 DM,HTN  - baseline Cr ~ 1.3-1.4 prior to Cirrhosis diagnosis in October 2016   - No proteinuria   - renal US - unremarkable      Cryptogenic Cirrhosis      Extensive HCC w/ Portal vein invasion :  - s/p TARE      Hypovolemic Hyponatremia     Metabolic acidosis : 2/2 ARF- oral Bicarb for now.      Hypotension : likely 2/2 Hypovolemia       Interval History:  orthostasis resolved   Ascitis is worsening   Cr better  Denies any CP/N/V/SOB/abdo pain     Review of Systems: Pertinent items are noted in HPI.     Current Medications:   Current Facility-Administered Medications   Medication Dose Route Frequency    albumin human 25% (BUMINATE) solution 25 g  25 g IntraVENous Multiple    albumin human 5% (BUMINATE) solution 25 g  25 g IntraVENous Q6H    [COMPLETED] lidocaine (XYLOCAINE) 10 mg/mL (1 %) injection 1 mL  1 mL SubCUTAneous RAD ONCE    sodium bicarbonate tablet 325 mg  325 mg Oral BID    insulin lispro (HUMALOG) injection   SubCUTAneous AC&HS    insulin lispro protamine/insulin lispro (HUMALOG MIX 75/25) injection 50 Units  50 Units SubCUTAneous ACB&D    cyanocobalamin (VITAMIN B12) tablet 1,000 mcg  1,000 mcg Oral DAILY    [START ON 3/4/2017] levothyroxine (SYNTHROID) tablet 400 mcg  400 mcg Oral Once per day on Sun Sat    levothyroxine (SYNTHROID) tablet 200 mcg  200 mcg Oral Once per day on Mon Tue Wed Thu Fri    LORazepam (ATIVAN) tablet 1 mg  1 mg Oral QHS    sodium chloride (NS) flush 5-10 mL  5-10 mL IntraVENous Q8H    sodium chloride (NS) flush 5-10 mL  5-10 mL IntraVENous PRN    glucose chewable tablet 16 g  4 Tab Oral PRN    dextrose (D50W) injection syrg 12.5-25 g  12.5-25 g IntraVENous PRN    glucagon (GLUCAGEN) injection 1 mg  1 mg IntraMUSCular PRN    acetaminophen (TYLENOL) tablet 650 mg  650 mg Oral Q6H PRN      Allergies   Allergen Reactions    Lyrica [Pregabalin] Hives       Objective:  Vitals:    Vitals:    03/02/17 1130 03/02/17 1145 03/02/17 1200 03/02/17 1215   BP: (!) 128/38 131/46 125/40 116/50   Pulse: 73 72 67 72   Resp: 18 18 18 18   Temp: 98.2 °F (36.8 °C)      SpO2: 96% 97% 96% 97%   Weight:       Height:         Intake and Output:  03/02 0701 - 03/02 1900  In: 340 [P.O.:340]  Out: 500 [Urine:200; Drains:300]  02/28 1901 - 03/02 0700  In: 3062.5 [P.O.:1480; I.V.:1582.5]  Out: 2550 [Urine:2550]    Physical Examination:    General: NAD,Conversant   Neck:  Supple, no mass  Resp:  Lungs CTA B/L, no wheezing , normal respiratory effort  CV:  RRR,  no murmur or rub,no LE edema  GI:  ascitis +. Soft, NT, + Bowel sounds, no hepatosplenomegaly  Neurologic:  Non focal  Psych:             AAO x 3 appropriate affect   Skin:  No Rash      []    High complexity decision making was performed  []    Patient is at high-risk of decompensation with multiple organ involvement    Lab Data Personally Reviewed: I have reviewed all the pertinent labs, microbiology data and radiology studies during assessment.     Recent Labs      03/02/17   0314  03/01/17   0421  02/28/17   1130   NA  135*  132*  130*   K  3.7  3.7  4.3   CL  103  101  97   CO2  18*  18*  18*   GLU  116*  151*  259*   BUN  77*  82*  80*   CREA  2.26*  2.70*  3.11*   CA  7.8*  7.6*  7.7*   MG  2.1  2.1   --    PHOS  4.7  5.7*   --    ALB   --   2.5*  2.2*   SGOT   --   26  29   ALT   --   20  23   INR   --    --   1.1     Recent Labs      03/01/17   0421  02/28/17   1130   WBC  6.0  7.3   HGB  8.3*  9.3* HCT  24.1*  27.5*   PLT  119*  145*     No results found for: SDES  Lab Results   Component Value Date/Time    Culture result: NO GROWTH 2 DAYS 02/28/2017 01:54 PM     Recent Results (from the past 24 hour(s))   GLUCOSE, POC    Collection Time: 03/01/17  4:43 PM   Result Value Ref Range    Glucose (POC) 332 (H) 65 - 100 mg/dL    Performed by [address], POC    Collection Time: 03/01/17 10:15 PM   Result Value Ref Range    Glucose (POC) 185 (H) 65 - 100 mg/dL    Performed by [address], BASIC    Collection Time: 03/02/17  3:14 AM   Result Value Ref Range    Sodium 135 (L) 136 - 145 mmol/L    Potassium 3.7 3.5 - 5.1 mmol/L    Chloride 103 97 - 108 mmol/L    CO2 18 (L) 21 - 32 mmol/L    Anion gap 14 5 - 15 mmol/L    Glucose 116 (H) 65 - 100 mg/dL    BUN 77 (H) 6 - 20 MG/DL    Creatinine 2.26 (H) 0.70 - 1.30 MG/DL    BUN/Creatinine ratio 34 (H) 12 - 20      GFR est AA 35 (L) >60 ml/min/1.73m2    GFR est non-AA 29 (L) >60 ml/min/1.73m2    Calcium 7.8 (L) 8.5 - 10.1 MG/DL   MAGNESIUM    Collection Time: 03/02/17  3:14 AM   Result Value Ref Range    Magnesium 2.1 1.6 - 2.4 mg/dL   PHOSPHORUS    Collection Time: 03/02/17  3:14 AM   Result Value Ref Range    Phosphorus 4.7 2.6 - 4.7 MG/DL   GLUCOSE, POC    Collection Time: 03/02/17  6:29 AM   Result Value Ref Range    Glucose (POC) 115 (H) 65 - 100 mg/dL    Performed by Javier Monge      I have reviewed the flowsheets. Chart and Pertinent Notes have been reviewed. No change in PMH ,family and social history from Consult note.       Jw Manzanares MD

## 2017-03-02 NOTE — PROGRESS NOTES
PT Note:    Attempted to see patient for PT treatment. Patient OFT for paracentesis. RN reports patient has been OOB and ambulating with assistance. Will continue to follow to advance mobility.

## 2017-03-02 NOTE — NURSE NAVIGATOR
Received phone call from patient's wife, Chanelle Yu, regarding patient's admission to [facility]. Wife reports patient is scheduled for an OP abdominal MRI on 3/7 and she requested from this to be done while IP. Wife also reports patient abdomen is distended and she asked for paracentesis to be preformed while IP. Noted consult to Hepatology was placed on 2/28, but Dr. Nata Varner is not aware of consult and there's no documentation that consult was called. Dr. Nata Varner made aware and will see patient today. Order placed for abdominal MRI to assess status of [address]

## 2017-03-02 NOTE — PROGRESS NOTES
1130: pt to ED US for paracentesis. 1227: procedure end. Pt tolerated well. MD attached catheter to drainage bag. Per attending MD Nancy Eastman, drain for 24 hours then refer to MD Perfecto Florence (hepatology) regarding if drain should be removed. Orders placed for administration of 25g albumin for every 5L of fluid removed. 1230: TRANSFER - OUT REPORT:    Verbal report given to Verna Nahid on Marley Mckeon  being transferred to [address] [place] [address] for routine progression of care       Report consisted of patients Situation, Background, Assessment and   Recommendations(SBAR). Information from the following report(s) SBAR, Procedure Summary and MAR was reviewed with the receiving nurse. Lines:   Peripheral IV 02/28/17 Left Antecubital (Active)   Site Assessment Clean, dry, & intact 3/1/2017  8:58 PM   Phlebitis Assessment 0 3/1/2017  8:58 PM   Infiltration Assessment 0 3/1/2017  8:58 PM   Dressing Status Clean, dry, & intact 3/1/2017  8:58 PM   Dressing Type Transparent 3/1/2017  8:58 PM   Hub Color/Line Status Pink; Infusing 3/1/2017  8:58 PM   Action Taken Blood drawn 2/28/2017 11:34 AM   Alcohol Cap Used Yes 3/1/2017  8:58 PM       Peripheral IV 02/28/17 Right Antecubital (Active)   Site Assessment Clean, dry, & intact 3/1/2017  8:58 PM   Phlebitis Assessment 0 3/1/2017  8:58 PM   Infiltration Assessment 0 3/1/2017  8:58 PM   Dressing Status Clean, dry, & intact 3/1/2017  8:58 PM   Dressing Type Transparent 3/1/2017  8:58 PM   Hub Color/Line Status Pink;Capped 3/1/2017  8:58 PM   Action Taken Blood drawn 2/28/2017  5:01 PM   Alcohol Cap Used Yes 3/1/2017  8:58 PM        Opportunity for questions and clarification was provided.       Patient transported with:   [employer]

## 2017-03-03 NOTE — PROGRESS NOTES
Patient blood pressure at this time is 129//60 66. Abdominal drain has been unclamped at this time.  Will continue to monitor

## 2017-03-03 NOTE — PROGRESS NOTES
[facility]   [address] [facility], [facility] [address] [facility], [facility]  Phone: [phone]   [phone]       Nephrology Progress Note  Paul Sosa     1940     [id number]  Date of Admission : 2/28/2017 03/03/17    CC: Follow up for YANIV       Assessment and Plan   YANIV on CKD :  - 2/2 pre renal azotemia   - improving w/ volume expansion     CKD Stage III :  - Likely 2/2 DM,HTN  - baseline Cr ~ 1.3-1.4 prior to Cirrhosis diagnosis in October 2016   - No proteinuria   - renal US - unremarkable      Cryptogenic Cirrhosis      Extensive HCC w/ Portal vein invasion :  - s/p TARE      Hypovolemic Hyponatremia     Metabolic acidosis : 2/2 ARF- oral Bicarb for now.      Hypotension : likely 2/2 Hypovolemia       Interval History:  Good U/O, creat decreasing    Review of Systems: Pertinent items are noted in HPI.     Current Medications:   Current Facility-Administered Medications   Medication Dose Route Frequency    albumin human 25% (BUMINATE) solution 25 g  25 g IntraVENous Q6H    albumin human 25% (BUMINATE) solution 25 g  25 g IntraVENous Multiple    sodium bicarbonate tablet 325 mg  325 mg Oral BID    insulin lispro (HUMALOG) injection   SubCUTAneous AC&HS    insulin lispro protamine/insulin lispro (HUMALOG MIX 75/25) injection 50 Units  50 Units SubCUTAneous ACB&D    cyanocobalamin (VITAMIN B12) tablet 1,000 mcg  1,000 mcg Oral DAILY    [START ON 3/4/2017] levothyroxine (SYNTHROID) tablet 400 mcg  400 mcg Oral Once per day on Sun Sat    levothyroxine (SYNTHROID) tablet 200 mcg  200 mcg Oral Once per day on Mon Tue Wed Thu Fri    LORazepam (ATIVAN) tablet 1 mg  1 mg Oral QHS    sodium chloride (NS) flush 5-10 mL  5-10 mL IntraVENous Q8H    sodium chloride (NS) flush 5-10 mL  5-10 mL IntraVENous PRN    glucose chewable tablet 16 g  4 Tab Oral PRN    dextrose (D50W) injection syrg 12.5-25 g  12.5-25 g IntraVENous PRN    glucagon (GLUCAGEN) injection 1 mg  1 mg IntraMUSCular PRN  acetaminophen (TYLENOL) tablet 650 mg  650 mg Oral Q6H PRN      Allergies   Allergen Reactions    Lyrica [Pregabalin] Hives       Objective:  Vitals:    Vitals:    03/03/17 0838 03/03/17 1200 03/03/17 1300 03/03/17 1548   BP: 111/65 123/57 120/67 99/58   Pulse: 76 66 70 64   Resp: 18 16  16   Temp: 98 °F (36.7 °C) 98.2 °F (36.8 °C)  98.3 °F (36.8 °C)   SpO2: 97% 97% 95% 96%   Weight:       Height:         Intake and Output:  03/03 0701 - 03/03 1900  In: -   Out: 3900 [Urine:1150; Drains:2750]  03/01 1901 - 03/03 0700  In: 1300 [P.O.:800; I.V.:500]  Out: 39500 [Urine:2200; Drains:63497]    Physical Examination:    General: NAD,Conversant   Neck:  Supple, no mass  Resp:  Few expir wheezes  CV:  RRR,  no murmur or rub, trace LE edema  GI:  ascitis +. Soft, NT, + Bowel sounds, no hepatosplenomegaly  Neurologic:  Non focal  Psych:             AAO x 3 appropriate affect   Skin:  No Rash      []    High complexity decision making was performed  []    Patient is at high-risk of decompensation with multiple organ involvement    Lab Data Personally Reviewed: I have reviewed all the pertinent labs, microbiology data and radiology studies during assessment.     Recent Labs      03/03/17   0857  03/02/17   0314  03/01/17   0421   NA  136  135*  132*   K  4.0  3.7  3.7   CL  104  103  101   CO2  20*  18*  18*   GLU  143*  116*  151*   BUN  56*  77*  82*   CREA  1.76*  2.26*  2.70*   CA  8.0*  7.8*  7.6*   MG   --   2.1  2.1   PHOS   --   4.7  5.7*   ALB   --    --   2.5*   SGOT   --    --   26   ALT   --    --   20     Recent Labs      03/03/17   0857  03/01/17   0421   WBC  7.1  6.0   HGB  9.4*  8.3*   HCT  27.6*  24.1*   PLT  109*  119*     No results found for: SDES  Lab Results   Component Value Date/Time    Culture result: NO GROWTH 3 DAYS 02/28/2017 01:54 PM     Recent Results (from the past 24 hour(s))   GLUCOSE, POC    Collection Time: 03/02/17  5:12 PM   Result Value Ref Range    Glucose (POC) 146 (H) 65 - 100 mg/dL Performed by Emmaline Dance    GLUCOSE, POC    Collection Time: 03/02/17  8:57 PM   Result Value Ref Range    Glucose (POC) 135 (H) 65 - 100 mg/dL    Performed by Emmaline Dance    GLUCOSE, POC    Collection Time: 03/02/17 11:50 PM   Result Value Ref Range    Glucose (POC) 60 (L) 65 - 100 mg/dL    Performed by [address] [place] [address], POC    Collection Time: 03/03/17 12:18 AM   Result Value Ref Range    Glucose (POC) 72 65 - 100 mg/dL    Performed by Joey 96, POC    Collection Time: 03/03/17 12:46 AM   Result Value Ref Range    Glucose (POC) 111 (H) 65 - 100 mg/dL    Performed by [address] [place] [address], POC    Collection Time: 03/03/17  6:22 AM   Result Value Ref Range    Glucose (POC) 113 (H) 65 - 100 mg/dL    Performed by Vanessa Ireland    CBC W/O DIFF    Collection Time: 03/03/17  8:57 AM   Result Value Ref Range    WBC 7.1 4.1 - 11.1 K/uL    RBC 2.79 (L) 4.10 - 5.70 M/uL    HGB 9.4 (L) 12.1 - 17.0 g/dL    HCT 27.6 (L) 36.6 - 50.3 %    MCV 98.9 80.0 - 99.0 FL    MCH 33.7 26.0 - 34.0 PG    MCHC 34.1 30.0 - 36.5 g/dL    RDW 15.2 (H) 11.5 - 14.5 %    PLATELET 681 (L) 436 - 363 K/uL   METABOLIC PANEL, BASIC    Collection Time: 03/03/17  8:57 AM   Result Value Ref Range    Sodium 136 136 - 145 mmol/L    Potassium 4.0 3.5 - 5.1 mmol/L    Chloride 104 97 - 108 mmol/L    CO2 20 (L) 21 - 32 mmol/L    Anion gap 12 5 - 15 mmol/L    Glucose 143 (H) 65 - 100 mg/dL    BUN 56 (H) 6 - 20 MG/DL    Creatinine 1.76 (H) 0.70 - 1.30 MG/DL    BUN/Creatinine ratio 32 (H) 12 - 20      GFR est AA 46 (L) >60 ml/min/1.73m2    GFR est non-AA 38 (L) >60 ml/min/1.73m2    Calcium 8.0 (L) 8.5 - 10.1 MG/DL   GLUCOSE, POC    Collection Time: 03/03/17 11:58 AM   Result Value Ref Range    Glucose (POC) 198 (H) 65 - 100 mg/dL    Performed by [address] [place] [address], POC    Collection Time: 03/03/17  5:01 PM   Result Value Ref Range    Glucose (POC) 242 (H) 65 - 100 mg/dL    Performed by Myriam Artis      I have reviewed the flowsheets. Chart and Pertinent Notes have been reviewed. No change in PMH ,family and social history from Consult note.       Jessica Rooln MD

## 2017-03-03 NOTE — PROGRESS NOTES
Bedside shift change report given to Courtney Sanchez RN (oncoming nurse) by Paola Nascimento RN (offgoing nurse). Report included the following information SBAR, Kardex, Intake/Output and MAR.

## 2017-03-03 NOTE — PROGRESS NOTES
2350: Pt stated he felt as though his blood sugar had dropped. Blood sugar checked, it was 60. Patient given 12 oz orange juice and corbin crackers    0018: Pts blood sugar rechecked 72.  Pt given more corbin crackers

## 2017-03-03 NOTE — PROGRESS NOTES
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 He was found to have cirrhosis and liver cancer in 10/1976 when he underwent cholecystectomy and then underwent imaging. [address] involves the left lobe, right lobe and there is invasion of the portal vein. AFP was 12,000. He was treated with Y-90 TARE to the larger right lobe [address] in 1/2017. EGD has demonstrated small esophageal varices which did not require banding. He has not developed ascites and has mild edema which resolved without diuretics. He has not developed HE.     He was sent to the ED a few days ago by Dr Rehan Rodas because of weakness and hypotension. He was found to have YANIV with creatinine of 3.11 mg. He had a lot of ascites on admission. He has undergone paracentesis and is still draining ascites. THis is improving with hydration. He could go home when Screat is back to baseline. We would like to repeat MRI to assess response of of [address] to TARE prior to DC. He feels well. Screast continues to decline and is now down to 1.76 mg. Should be ready to go home by Sunday. I am away Saturday. I will see him Sunday evening if he is still hospitalized when I return.      ASSESSMENT AND PLAN:  Cryptogenic cirrhosis  The etiology is most likely MATIAS. The current CTP score is 6, Child class A, MELD score 27. THis is all driven by the YANIV and will imorove as renal function improves.     Ascites  This is from cirrhosis.  Doubt it is malignant. Paracentesis perfomred this AM. Will remove catheter in AM. Holding diuretics because of YANIV.     HCC  He has stage 4 HCC with vascualr invation. We have treated this with a single dose of Y-90 TARE. We have offered Nexavar but he declined. He is due for MRI to assess response to Y-90 TACE. Will get this during this hospitalization, just prior to DC, after Screat is normal so we can give IV contrast. There is no benefit in performing the MRI without IV contrast. Will wait and do this just prior to DC after renal function is back to baseline. Will decide if he is a candidate for additonal treatment after we seen the MRI.     YANIV  Will treat with IV albumin 25 gm Q6H. He is responding well and Screat continues to come down daily. He was not on diuretics prior to the hospitalization. He has not developed edema or ascites.     PHYSICAL EXAMINATION:  VS: per nursing note  General: No acute distress. Eyes: Sclera anicteric. ENT: No oral lesions. Thyroid normal.  Nodes: No adenopathy. Skin: Spider angiomata. No jaundice. Respiratory: Lungs clear to auscultation. Cardiovascular: Regular heart rate. Abdomen: Soft non-tender, No obvious ascites. Extremities: No lower extremity edema. Neurologic: Alert and oriented. Lethargic. Confused. Cranial nerves grossly intact. No asterixis.        LABORATORY:  Results for Laura Nova (MRN [id number]) as of 3/3/2017 11:57   Ref.  Range 2/28/2017 11:30 3/1/2017 04:21 3/2/2017 03:14 3/3/2017 08:57   WBC Latest Ref Range: 4.1 - 11.1 K/uL 7.3 6.0  7.1   HGB Latest Ref Range: 12.1 - 17.0 g/dL 9.3 (L) 8.3 (L)  9.4 (L)   PLATELET Latest Ref Range: 150 - 400 K/uL 145 (L) 119 (L)  109 (L)   Sodium Latest Ref Range: 136 - 145 mmol/L 130 (L) 132 (L) 135 (L) 136   Potassium Latest Ref Range: 3.5 - 5.1 mmol/L 4.3 3.7 3.7 4.0   Chloride Latest Ref Range: 97 - 108 mmol/L 97 101 103 104   CO2 Latest Ref Range: 21 - 32 mmol/L 18 (L) 18 (L) 18 (L) 20 (L)   Glucose Latest Ref Range: 65 - 100 mg/dL 259 (H) 151 (H) 116 (H) 143 (H)   BUN Latest Ref Range: 6 - 20 MG/DL 80 (H) 82 (H) 77 (H) 56 (H)   Creatinine Latest Ref Range: 0.70 - 1.30 MG/DL 3.11 (H) 2.70 (H) 2.26 (H) 1.76 (H)   Bilirubin, total Latest Ref Range: 0.2 - 1.0 MG/DL 0.5 0.7     Albumin Latest Ref Range: 3.5 - 5.0 g/dL 2.2 (L) 2.5 (L)     ALT Latest Ref Range: 12 - 78 U/L 23 20     AST Latest Ref Range: 15 - 37 U/L 29 26     Alk.  phosphatase Latest Ref Range: 45 - 117 U/L 314 (H) 253 (H)       Estrada Mann MD  [facility] [place] [address] [address] [address]  [phone]

## 2017-03-03 NOTE — PROGRESS NOTES
Hospitalist Progress Note  Kareen Ridley MD  Office: [phone]  Cell: [phone]      Date of Service:  3/3/2017  NAME:  Paul Sosa  :  1940  MRN:  [id number]      Admission Summary:   77 yo male with PMhx of Cryptogenic Cirrhosis, HCC s/p TARE, DM, Hypothyroidism admitted for hypotension, fatigue, dizziness. Pt recently had multiple paracentesis totaling ~20liters out, last paracentesis was 6 days PTA. Interval history / Subjective:     Pt seen and examined  Dizziness improved  Today having abdominal discomfort and fullness     Assessment & Plan:     1. YANIV on CKD 3, appears pre-renal, improving  - c/w Albumin   - hold Diuretics  - No benazepril at discharge  - Nephrology following    2. Hypotension 2/2 intravascular depletion due to large volume taps  - improved    3. [facility] [address] [address] with portal vein invasion  - s/p TARE  -  consulted  - MRI abd w/ and w/o cont prior to d/c if Cr normalizes    4. Hyponatremia 2/2 Hypovolemia  - improved    5. Metabolic Acidosis 2/2 ARF  - monitor    6. Thrombocytopenia 2/2 Cirrhosis  - monitor     7. Ascites 2/2 HCC and Cryptogenic Cirrhosis  - s/p paracentesis, ~14L out since discharge   - c/w albumin    Code status: FULL  DVT prophylaxis: [address] discussed with: Patient/Family and Nurse  Disposition: Home w/Family and HH PT, OT, RN     Hospital Problems  Date Reviewed: 2017          Codes Class Noted POA    * (Principal)Hypotension ICD-10-CM: I95.9  ICD-9-CM: 458.9  2017 Yes        YANIV (acute kidney injury) [facility]) ICD-10-CM: N17.9  ICD-9-CM: 584.9  2017 Yes        Cirrhosis ([facility] ICD-10-CM: K74.60  ICD-9-CM: 571.5  10/30/2016 Yes        Diabetes mellitus, type 2 ([facility] ICD-10-CM: E11.9  ICD-9-CM: 250.00  Unknown Yes                Review of Systems:   A comprehensive review of systems was negative except for that written in the HPI.        Vital Signs:    Last 24hrs VS reviewed since prior progress note. Most recent are:  Visit Vitals    /65 (BP 1 Location: Left arm, BP Patient Position: Sitting)    Pulse 76    Temp 98 °F (36.7 °C)    Resp 18    Ht 6' 2\" (1.88 m)    Wt 118.5 kg (261 lb 4.8 oz)    SpO2 97%    BMI 33.55 kg/m2         Intake/Output Summary (Last 24 hours) at 03/03/17 1036  Last data filed at 03/03/17 0959   Gross per 24 hour   Intake              460 ml   Output            92337 ml   Net           -91982 ml        Physical Examination:             Constitutional:  No acute distress, cooperative, pleasant    ENT:  Oral mucous moist, oropharynx benign. Neck supple,    Resp:  CTA bilaterally. No wheezing/rhonchi/rales. No accessory muscle use   CV:  Regular rhythm, normal rate, no murmurs, gallops, rubs    GI:  Soft, soft, NT, ND    Musculoskeletal:  1+ edema b/l LE    Neurologic:  Moves all extremities. AAOx3, CN II-XII reviewed     Psych:  Good insight, Not anxious nor agitated. Data Review:    Review and/or order of clinical lab test  Review and/or order of tests in the radiology section of CPT  Review and/or order of tests in the medicine section of CPT      Labs:     Recent Labs      03/03/17   0857  03/01/17   0421   WBC  7.1  6.0   HGB  9.4*  8.3*   HCT  27.6*  24.1*   PLT  109*  119*     Recent Labs      03/03/17   0857  03/02/17   0314  03/01/17   0421   NA  136  135*  132*   K  4.0  3.7  3.7   CL  104  103  101   CO2  20*  18*  18*   BUN  56*  77*  82*   CREA  1.76*  2.26*  2.70*   GLU  143*  116*  151*   CA  8.0*  7.8*  7.6*   MG   --   2.1  2.1   PHOS   --   4.7  5.7*     Recent Labs      03/01/17   0421  02/28/17   1130   SGOT  26  29   ALT  20  23   AP  253*  314*   TBILI  0.7  0.5   TP  5.8*  6.2*   ALB  2.5*  2.2*   GLOB  3.3  4.0     Recent Labs      02/28/17   1130   INR  1.1   PTP  10.7   APTT  35.6*      No results for input(s): FE, TIBC, PSAT, FERR in the last 72 hours.    No results found for: FOL, RBCF   No results for input(s): PH, PCO2, PO2 in the last 72 hours. No results for input(s): CPK, CKNDX, TROIQ in the last 72 hours.     No lab exists for component: CPKMB  No results found for: CHOL, CHOLX, CHLST, CHOLV, HDL, LDL, DLDL, LDLC, DLDLP, TGL, TGLX, TRIGL, TRIGP, CHHD, CHHDX  Lab Results   Component Value Date/Time    Glucose (POC) 113 03/03/2017 06:22 AM    Glucose (POC) 111 03/03/2017 12:46 AM    Glucose (POC) 72 03/03/2017 12:18 AM    Glucose (POC) 60 03/02/2017 11:50 PM    Glucose (POC) 135 03/02/2017 08:57 PM     Lab Results   Component Value Date/Time    Color YELLOW/STRAW 02/28/2017 02:56 PM    Appearance CLEAR 02/28/2017 02:56 PM    Specific gravity 1.016 02/28/2017 02:56 PM    pH (UA) 5.0 02/28/2017 02:56 PM    Protein NEGATIVE  02/28/2017 02:56 PM    Glucose NEGATIVE  02/28/2017 02:56 PM    Ketone NEGATIVE  02/28/2017 02:56 PM    Bilirubin NEGATIVE  02/28/2017 02:56 PM    Urobilinogen 0.2 02/28/2017 02:56 PM    Nitrites NEGATIVE  02/28/2017 02:56 PM    Leukocyte Esterase NEGATIVE  02/28/2017 02:56 PM    Epithelial cells FEW 02/28/2017 02:56 PM    Bacteria NEGATIVE  02/28/2017 02:56 PM    WBC 0-4 02/28/2017 02:56 PM    RBC 0-5 02/28/2017 02:56 PM         Medications Reviewed:     Current Facility-Administered Medications   Medication Dose Route Frequency    albumin human 25% (BUMINATE) solution 25 g  25 g IntraVENous Q6H    albumin human 25% (BUMINATE) solution 25 g  25 g IntraVENous Multiple    sodium bicarbonate tablet 325 mg  325 mg Oral BID    insulin lispro (HUMALOG) injection   SubCUTAneous AC&HS    insulin lispro protamine/insulin lispro (HUMALOG MIX 75/25) injection 50 Units  50 Units SubCUTAneous ACB&D    cyanocobalamin (VITAMIN B12) tablet 1,000 mcg  1,000 mcg Oral DAILY    [START ON 3/4/2017] levothyroxine (SYNTHROID) tablet 400 mcg  400 mcg Oral Once per day on Sun Sat    levothyroxine (SYNTHROID) tablet 200 mcg  200 mcg Oral Once per day on Mon Tue Wed Thu Fri    LORazepam (ATIVAN) tablet 1 mg 1 mg Oral QHS    sodium chloride (NS) flush 5-10 mL  5-10 mL IntraVENous Q8H    sodium chloride (NS) flush 5-10 mL  5-10 mL IntraVENous PRN    glucose chewable tablet 16 g  4 Tab Oral PRN    dextrose (D50W) injection syrg 12.5-25 g  12.5-25 g IntraVENous PRN    glucagon (GLUCAGEN) injection 1 mg  1 mg IntraMUSCular PRN    acetaminophen (TYLENOL) tablet 650 mg  650 mg Oral Q6H PRN     ______________________________________________________________________  EXPECTED LENGTH OF STAY: 3d 9h  ACTUAL LENGTH OF STAY:          3                 Harlan Keating MD

## 2017-03-03 NOTE — PROGRESS NOTES
Problem: Mobility Impaired (Adult and Pediatric)  Goal: *Acute Goals and Plan of Care (Insert Text)  Physical Therapy Goals  Initiated 3/1/2017  1. Patient will move from supine to sit and sit to supine in bed with independence within 3 day(s). 2. Patient will transfer from bed to chair and chair to bed with independence using the least restrictive device within 3 day(s). 3. Patient will perform sit to stand with independence within 3 day(s). 4. Patient will ambulate with independence for 400 feet with the least restrictive device within 3 day(s). 5. Patient will ascend/descend 5 stairs with 1 handrail(s) with modified independence within 3 day(s). PHYSICAL THERAPY TREATMENT  Patient: Vinay Christianson (86 y.o. male)  Date: 3/3/2017  Diagnosis: Hypotension Hypotension       Precautions: Fall (low BP)      ASSESSMENT:  Patient sitting up EOB upon arrival having eaten lunch EOB. VS noted in flowsheet, but he did experience a drop in BP in standing, (systolic 576'G sitting, 68'X standing, 90's after marching in place and 108 after walking). He walks with fairly good balance and some UMANZOR with minimal exertion, but SpO2 95% on room air after walking. He would benefit from HHPT to continue to monitor his BP with activity and progress appropriately. Progression toward goals:  [X]    Improving appropriately and progressing toward goals  [ ]    Improving slowly and progressing toward goals  [ ]    Not making progress toward goals and plan of care will be adjusted       PLAN:  Patient continues to benefit from skilled intervention to address the above impairments. Continue treatment per established plan of care. Discharge Recommendations:  Home Health  Further Equipment Recommendations for Discharge:  none       SUBJECTIVE:   Patient stated I can walk around some.       OBJECTIVE DATA SUMMARY:   Critical Behavior:  Neurologic State: Alert  Orientation Level: Oriented X4  Cognition: Appropriate decision making, Appropriate for age attention/concentration, Appropriate safety awareness, Follows commands  Safety/Judgement: Awareness of environment, Fall prevention, Good awareness of safety precautions, Home safety, Insight into deficits  Functional Mobility Training:  Bed Mobility:  Rolling: Modified independent; Additional time  Supine to Sit: Modified independent; Additional time  Sit to Supine: Modified independent; Additional time           Transfers:  Sit to Stand: Contact guard assistance  Stand to Sit: Contact guard assistance                             Balance:  Sitting: Intact; Without support  Standing: Impaired; Without support  Standing - Static: Good  Standing - Dynamic : Fair  Ambulation/Gait Training:  Distance (ft): 150 Feet (ft)  Assistive Device: Gait belt  Ambulation - Level of Assistance: Contact guard assistance; Additional time        Gait Abnormalities: Decreased step clearance;Trunk sway increased                                               Patient walks slowly and with some UMANZOR but sat's stable and BP improving with activity  Stairs:                       Pain:  Pain Scale 1: Numeric (0 - 10)  Pain Intensity 1: 5  Pain Location 1: Abdomen  Pain Orientation 1: Left;Right  Pain Description 1: Aching; Sharp  Pain Intervention(s) 1: Medication (see MAR)  Activity Tolerance:   Fair, need to continue to monitor VS and progress as appropriate  Please refer to the flowsheet for vital signs taken during this treatment.   After treatment:   [ ]    Patient left in no apparent distress sitting up in chair  [X]    Patient left in no apparent distress in bed  [X]    Call bell left within reach  [X]    Nursing notified  [X]    Caregiver present, nursing  [ ]    Bed alarm activated      COMMUNICATION/COLLABORATION:   The patients plan of care was discussed with: Registered Nurse and      Abraham Schaeffer, PT   Time Calculation: 23 mins

## 2017-03-03 NOTE — DIABETES MGMT
DTC Progress Note    Recommendations/ Comments: Chart reviewed due to hypoglycemia. If appropriate, please consider:  1. Decreasing Humalog 75/25 dose to 45 units BID  2. Changing correction scale to normal sensitivity. Chart reviewed on Darryl Almazan. Patient is a 78 y.o. male with known diabetes on 70/30 50 units BID at home. A1c:   No results found for: HBA1C, HGBE8, BCO5EALT    Recent Glucose Results: Lab Results   Component Value Date/Time    GLUCPOC 113 (H) 03/03/2017 06:22 AM    GLUCPOC 111 (H) 03/03/2017 12:46 AM    GLUCPOC 72 03/03/2017 12:18 AM        Lab Results   Component Value Date/Time    Creatinine 2.26 03/02/2017 03:14 AM       Active Orders   Diet    DIET RENAL WITH OPTIONS 70-70-70 (House); Regular        PO intake: Patient Vitals for the past 72 hrs:   % Diet Eaten   03/02/17 1359 100 %   03/02/17 0835 100 %   03/01/17 1748 100 %   03/01/17 1346 50 %   03/01/17 0944 50 %   02/28/17 1802 100 %       Current hospital DM medication: Humalog 75/25 50 units BID and correction scale Humalog, resistant scale. Will continue to follow as needed.     Thank you  Boston Schilder, RD, CDE

## 2017-03-03 NOTE — PROGRESS NOTES
Chart reviewed. CM noted orders for [facility]. CM met with pt and wife Maryse Ortega to obtain [facility] choice. Wife stated pt had HH previously but cannot remember agency name. Offered freedom of choice, wife chose [facility]. CM sent referral to [facility] via [employer]. CM following. [facility] is willing to accept pt.     Cinthya Song BSW/CRM

## 2017-03-04 NOTE — PROGRESS NOTES
[facility]   [address] [facility], [facility] [address], [facility]  Phone: [phone]   [phone]       Nephrology Progress Note  Linda Smalls     1940     [id number]  Date of Admission : 2/28/2017 03/04/17    CC: Follow up for YANIV       Assessment and Plan   YANIV on CKD :  - 2/2 pre renal azotemia   - improving w/ volume expansion     CKD Stage III :  - Likely 2/2 DM,HTN  - baseline Cr ~ 1.3-1.4 prior to Cirrhosis diagnosis in October 2016   - No proteinuria   - renal US - unremarkable      Cryptogenic Cirrhosis   - large ascitis  S/p 17 L para this admission      Extensive HCC w/ Portal vein invasion :  - s/p TARE      Metabolic acidosis : 2/2 ARF- oral Bicarb for now.           Interval History:  Good U/O, creat decreasing  ascitis better     Review of Systems: Pertinent items are noted in HPI.     Current Medications:   Current Facility-Administered Medications   Medication Dose Route Frequency    albumin human 25% (BUMINATE) solution 25 g  25 g IntraVENous Q6H    albumin human 25% (BUMINATE) solution 25 g  25 g IntraVENous Multiple    sodium bicarbonate tablet 325 mg  325 mg Oral BID    insulin lispro (HUMALOG) injection   SubCUTAneous AC&HS    insulin lispro protamine/insulin lispro (HUMALOG MIX 75/25) injection 50 Units  50 Units SubCUTAneous ACB&D    cyanocobalamin (VITAMIN B12) tablet 1,000 mcg  1,000 mcg Oral DAILY    levothyroxine (SYNTHROID) tablet 400 mcg  400 mcg Oral Once per day on Sun Sat    levothyroxine (SYNTHROID) tablet 200 mcg  200 mcg Oral Once per day on Mon Tue Wed Thu Fri    LORazepam (ATIVAN) tablet 1 mg  1 mg Oral QHS    sodium chloride (NS) flush 5-10 mL  5-10 mL IntraVENous Q8H    sodium chloride (NS) flush 5-10 mL  5-10 mL IntraVENous PRN    glucose chewable tablet 16 g  4 Tab Oral PRN    dextrose (D50W) injection syrg 12.5-25 g  12.5-25 g IntraVENous PRN    glucagon (GLUCAGEN) injection 1 mg  1 mg IntraMUSCular PRN    acetaminophen (TYLENOL) tablet 650 mg  650 mg Oral Q6H PRN      Allergies   Allergen Reactions    Lyrica [Pregabalin] Hives       Objective:  Vitals:    Vitals:    03/03/17 2355 03/04/17 0550 03/04/17 0640 03/04/17 0904   BP: 109/60 137/68  150/67   Pulse: 65 61  62   Resp: 16 16  18   Temp: 98 °F (36.7 °C) 98.7 °F (37.1 °C)  98.2 °F (36.8 °C)   SpO2: 97% 98%  99%   Weight:   118.9 kg (262 lb 3.2 oz)    Height:         Intake and Output:  03/04 0701 - 03/04 1900  In: 200 [P.O.:200]  Out: -   03/02 1901 - 03/04 0700  In: 5 [P.O.:420]  Out: 11158 [Urine:3550; Drains:6550]    Physical Examination:    General: NAD,Conversant   Neck:  Supple, no mass  Resp:  Few expir wheezes  CV:  RRR,  no murmur or rub, trace LE edema  GI:  ascitis +. Soft, NT, + Bowel sounds, no hepatosplenomegaly  Neurologic:  Non focal  Psych:             AAO x 3 appropriate affect   Skin:  No Rash      []    High complexity decision making was performed  []    Patient is at high-risk of decompensation with multiple organ involvement    Lab Data Personally Reviewed: I have reviewed all the pertinent labs, microbiology data and radiology studies during assessment.     Recent Labs      03/04/17   0515  03/03/17   0857  03/02/17   0314   NA  135*  136  135*   K  3.8  4.0  3.7   CL  106  104  103   CO2  19*  20*  18*   GLU  76  143*  116*   BUN  45*  56*  77*   CREA  1.51*  1.76*  2.26*   CA  7.8*  8.0*  7.8*   MG   --    --   2.1   PHOS   --    --   4.7     Recent Labs      03/03/17   0857   WBC  7.1   HGB  9.4*   HCT  27.6*   PLT  109*     No results found for: SDES  Lab Results   Component Value Date/Time    Culture result: NO GROWTH 4 DAYS 02/28/2017 01:54 PM     Recent Results (from the past 24 hour(s))   GLUCOSE, POC    Collection Time: 03/03/17 11:58 AM   Result Value Ref Range    Glucose (POC) 198 (H) 65 - 100 mg/dL    Performed by [address] [place] [address], [facility]    Collection Time: 03/03/17  5:01 PM   Result Value Ref Range    Glucose (POC) 242 (H) 65 - 100 mg/dL    Performed by German Armendariz, POC    Collection Time: 03/03/17 10:03 PM   Result Value Ref Range    Glucose (POC) 202 (H) 65 - 100 mg/dL    Performed by Sunny Blanchard    METABOLIC PANEL, BASIC    Collection Time: 03/04/17  5:15 AM   Result Value Ref Range    Sodium 135 (L) 136 - 145 mmol/L    Potassium 3.8 3.5 - 5.1 mmol/L    Chloride 106 97 - 108 mmol/L    CO2 19 (L) 21 - 32 mmol/L    Anion gap 10 5 - 15 mmol/L    Glucose 76 65 - 100 mg/dL    BUN 45 (H) 6 - 20 MG/DL    Creatinine 1.51 (H) 0.70 - 1.30 MG/DL    BUN/Creatinine ratio 30 (H) 12 - 20      GFR est AA 55 (L) >60 ml/min/1.73m2    GFR est non-AA 45 (L) >60 ml/min/1.73m2    Calcium 7.8 (L) 8.5 - 10.1 MG/DL   GLUCOSE, POC    Collection Time: 03/04/17  6:07 AM   Result Value Ref Range    Glucose (POC) 80 65 - 100 mg/dL    Performed by Jordan Found      I have reviewed the flowsheets. Chart and Pertinent Notes have been reviewed. No change in PMH ,family and social history from Consult note.       Jhoan Escalante MD

## 2017-03-04 NOTE — DISCHARGE SUMMARY
Discharge Summary     Patient:  Teddy Jacobo       MRN: [id number]       YOB: 1940       Age: 78 y.o. Date of admission:  2/28/2017    Date of discharge:  3/4/2017    Primary care provider: Dr. Fiona Pacheco MD    Admitting provider:  Carmine Martinez MD    Discharging provider:  Cesar Walsh MD - [phone]  If unavailable, call [phone] and ask the  to page the triage hospitalist.    Consultations  · IP CONSULT TO HOSPITALIST  · IP CONSULT TO NEPHROLOGY  · IP CONSULT TO HEPATOLOGY    Procedures  · * No surgery found *    Discharge destination: Home with [facility]. The patient is stable for discharge. Admission diagnosis  · Hypotension    Current Discharge Medication List      START taking these medications    Details   sodium bicarbonate 325 mg tablet Take 1 Tab by mouth two (2) times a day. Qty: 60 Tab, Refills: 0      traMADol (ULTRAM) 50 mg tablet Take 1 Tab by mouth every six (6) hours as needed. Max Daily Amount: 200 mg. Qty: 30 Tab, Refills: 0         CONTINUE these medications which have CHANGED    Details   bumetanide (BUMEX) 1 mg tablet Take 2 Tabs by mouth two (2) times a day. Resume on 3/6/17. Qty: 60 Tab, Refills: 1    Associated Diagnoses: Acute diastolic heart failure ([address]         CONTINUE these medications which have NOT CHANGED    Details   CHOLECALCIFEROL, VITAMIN D3, (VITAMIN D3 PO) Take 10,000 Units by mouth daily. !! insulin aspart protamine/insulin aspart (NOVOLOG MIX 70-30 FLEXPEN) 100 unit/mL (70-30) inpn 10 Units by SubCUTAneous route nightly as needed (if BS >190 takes 10 units). !! levothyroxine (SYNTHROID) 200 mcg tablet Take 200 mcg by mouth five (5) days a week. Daily Mon through Fri      potassium 99 mg tablet Take 99 mg by mouth every evening.       !! insulin aspart protamine/insulin aspart (NOVOLOG MIX 70-30 FLEXPEN) 100 unit/mL (70-30) inpn 50 Units by SubCUTAneous route Before breakfast and dinner. L. RHAMNOSUS GG/INULIN (CULTURELLE PROBIOTICS PO) Take 1 Cap by mouth every evening. !! levothyroxine (SYNTHROID) 200 mcg tablet Take 400 mcg by mouth two (2) days a week. Takes 400 mcg Sat, Sun      cyanocobalamin (VITAMIN B-12) 1,000 mcg tablet Take 1,000 mcg by mouth daily. lorazepam (ATIVAN) 1 mg tablet Take 1 mg by mouth nightly.      magnesium oxide 400 mg cap Take 400 mg by mouth daily. (ran out)       ! ! - Potential duplicate medications found. Please discuss with provider. STOP taking these medications       benazepril (LOTENSIN) 10 mg tablet Comments:   Reason for Stopping: Follow-up Information     Follow up With Details Comments [address] On 3/5/2017 For home health nursing, physical therapy, and occupational therapy. Please contact agency if you have not heard from them by 12 PM the first full day after discharge. Nicolette Guzman MD In 2 weeks Discharge follow up  [address] [facility] [address]      Aracelis Redd MD  F/U next week in office, call to make appointment [address] [place] [address] Place  [phone]            Final discharge diagnoses and brief hospital course  Please also refer to the admission H&P for details on the presenting problem. 79 yo male with PMhx of Cryptogenic Cirrhosis, HCC s/p TARE, DM, Hypothyroidism admitted for hypotension, fatigue, dizziness. Pt recently had multiple paracentesis totaling ~20liters out, last paracentesis was 6 days PTA. 1. YANIV on CKD 3, appears pre-renal, improved  - c/w Albumin   - hold Diuretic, resume 3/8/17  - C/w Bicarb po   - No benazepril at discharge  - Nephrology following     2. Hypotension 2/2 intravascular depletion due to large volume taps  - improved     3.  [address] with portal vein invasion  - s/p TARE  -  consulted  - MRI abd w/ and w/o cont done, f/u with  to discuss the result     4. Hyponatremia 2/2 Hypovolemia  - improved     5. Metabolic Acidosis 2/2 ARF  - monitor     6. Thrombocytopenia 2/2 Cirrhosis  - monitor      7. Ascites 2/2 HCC and Cryptogenic Cirrhosis  - s/p paracentesis, ~14L out   - c/w albumin  - catheter removed 3/3/17  - this will be persistent issue,  to decide to place permanent drain at some point    FOLLOW-UP TESTS recommended:   1. Repeat labs next week at f/u appointment with      PENDING TEST RESULTS:  At the time of your discharge the following test results are still pending: none  Please make sure you review these results with your outpatient follow-up provider(s).     SYMPTOMS to watch for: chest pain, shortness of breath, fever, chills, nausea, vomiting, diarrhea, change in mentation, falling, weakness, bleeding.     DIET/what to eat:  Cardiac Diet     ACTIVITY:  Activity as tolerated with Home health PT/OT     WOUND CARE: NONE     EQUIPMENT needed: NONE    Physical examination at discharge  Visit Vitals    /67    Pulse 62    Temp 98.2 °F (36.8 °C)    Resp 18    Ht 6' 2\" (1.88 m)    Wt 118.9 kg (262 lb 3.2 oz)    SpO2 99%    BMI 33.66 kg/m2     Constitutional: No acute distress, cooperative, pleasant    ENT: Oral mucous moist, oropharynx benign. Neck supple,    Resp: CTA bilaterally. No wheezing/rhonchi/rales. No accessory muscle use   CV: Regular rhythm, normal rate, no murmurs, gallops, rubs   GI: Soft, soft, NT, ND   Musculoskeletal: 1+ edema b/l LE   Neurologic: Moves all extremities. AAOx3, CN II-XII reviewed   Psych: Good insight, Not anxious nor agitated.       Pertinent imaging studies:      Recent Labs      03/03/17   0857   WBC  7.1   HGB  9.4*   HCT  27.6*   PLT  109*     Recent Labs      03/04/17   0515  03/03/17   0857  03/02/17   0314   NA  135*  136  135*   K  3.8  4.0  3.7   CL  106  104  103   CO2  19*  20*  18* BUN  45*  56*  77*   CREA  1.51*  1.76*  2.26*   GLU  76  143*  116*   CA  7.8*  8.0*  7.8*   MG   --    --   2.1   PHOS   --    --   4.7     No results for input(s): SGOT, GPT, AP, TBIL, TP, ALB, GLOB, GGT, AML, LPSE in the last 72 hours. No lab exists for component: AMYP, HLPSE  No results for input(s): INR, PTP, APTT in the last 72 hours. No lab exists for component: INREXT   No results for input(s): FE, TIBC, PSAT, FERR in the last 72 hours. No results for input(s): PH, PCO2, PO2 in the last 72 hours. No results for input(s): CPK, CKMB in the last 72 hours.     No lab exists for component: TROPONINI  No components found for: [place]    Chronic Diagnoses:    Problem List as of 3/4/2017  Date Reviewed: 2/6/2017          Codes Class Noted - Resolved    * (Principal)Hypotension ICD-10-CM: I95.9  ICD-9-CM: 458.9  2/28/2017 - Present        YANIV (acute kidney injury) ([facility] ICD-10-CM: N17.9  ICD-9-CM: 584.9  2/28/2017 - Present        Hepatocellular carcinoma ([facility] ICD-10-CM: C22.0  ICD-9-CM: 155.0  2/6/2017 - Present        Thrombocytopenia ([facility] ICD-10-CM: D69.6  ICD-9-CM: 287.5  11/10/2016 - Present        Cirrhosis ([facility] ICD-10-CM: K74.60  ICD-9-CM: 571.5  10/30/2016 - Present        Chronic diastolic heart failure (HCC) ICD-10-CM: I50.32  ICD-9-CM: 428.32  11/9/2011 - Present        Acute diastolic heart failure (HCC) ICD-10-CM: I50.31  ICD-9-CM: 428.31  Unknown - Present        Diabetes mellitus, type 2 (HCC) ICD-10-CM: E11.9  ICD-9-CM: 250.00  Unknown - Present        Hyperlipidemia ICD-10-CM: E78.5  ICD-9-CM: 272.4  Unknown - Present        COPD (chronic obstructive pulmonary disease) ([facility] ICD-10-CM: J44.9  ICD-9-CM: 844  Unknown - Present        Colon polyps ICD-10-CM: K63.5  ICD-9-CM: 211.3  Unknown - Present        Hypothyroid ICD-10-CM: E03.9  ICD-9-CM: 244.9  Unknown - Present        RESOLVED: Abdominal pain ICD-10-CM: R10.9  ICD-9-CM: 789.00  10/10/2016 - 11/10/2016        RESOLVED: LVH (left ventricular hypertrophy) due to hypertensive disease ICD-10-CM: I11.9  ICD-9-CM: 402.90  Unknown - 11/10/2016              Time spent on discharge related activities today greater than 30 minutes.       Signed:  Mallory Ballesteros MD                 Hospitalist, Internal Medicine      Cc: Deirdre Miramontes MD

## 2017-03-04 NOTE — PROGRESS NOTES
Bedside shift change report given to Bindu Bray [phone] (oncoming nurse) by Laura Sargent (offgoing nurse). Report included the following information SBAR.

## 2017-03-04 NOTE — DISCHARGE INSTRUCTIONS
Please bring this form with you to show your primary care provider at your follow-up appointment. Primary care provider:  Dr. Jose Alejandro Veloz MD    Discharging provider:  Harlan Keating MD    You have been admitted to the hospital with the following diagnoses:  · Hypotension    FOLLOW-UP CARE RECOMMENDATIONS:    APPOINTMENTS:  Follow-up Information     Follow up With Details Comments Lizette Busch On 3/5/2017 For home health nursing, physical therapy, and occupational therapy. Please contact agency if you have not heard from them by 12 PM the first full day after discharge. Nicolette Nascimento MD In 2 weeks Discharge follow up  [facility] [address]  [address] [facility] [address]      Raymond Edouard MD  F/U next week in office, call to make appointment [address]  [address] [place] recommended:   1. Repeat labs next week at f/u appointment with     PENDING TEST RESULTS:  At the time of your discharge the following test results are still pending: none  Please make sure you review these results with your outpatient follow-up provider(s). SYMPTOMS to watch for: chest pain, shortness of breath, fever, chills, nausea, vomiting, diarrhea, change in mentation, falling, weakness, bleeding. DIET/what to eat:  Cardiac Diet    ACTIVITY:  Activity as tolerated with Home health PT/OT    WOUND CARE: NONE    EQUIPMENT needed:  NONE      What to do if new or unexpected symptoms occur? If you experience any of the above symptoms (or should other concerns or questions arise after discharge) please call your primary care physician. Return to the emergency room if you cannot get hold of your doctor. · It is very important that you keep your follow-up appointment(s).   · Please bring discharge papers, medication list (and/or medication bottles) to your follow-up appointments for review by your outpatient provider(s). · Please check the list of medications and be sure it includes every medication (even non-prescription medications) that your provider wants you to take. · It is important that you take the medication exactly as they are prescribed. · Keep your medication in the bottles provided by the pharmacist and keep a list of the medication names, dosages, and times to be taken in your wallet. · Do not take other medications without consulting your doctor. · If you have any questions about your medications or other instructions, please talk to your nurse or care provider before you leave the hospital.    I understand that if any problems occur once I am at home I am to contact my physician. These instructions were explained to me and I had the opportunity to ask questions.

## 2017-03-13 NOTE — ROUTINE PROCESS
Pt. Discharged to home and transported to d/c lot via w/c. Family and pt. Verbalized understanding of d/c instructions.

## 2017-03-13 NOTE — IP AVS SNAPSHOT
[address] 
[phone] Patient: Stuart Troy MRN: [id number] [facility]:85/91/3216 You are allergic to the following Allergen Reactions Lyrica (Pregabalin) Hives Recent Documentation Smoking Status Former Smoker Unresulted Labs Order Current Status ALBUMIN, FLUID In process CELL COUNT, BODY FLUID In process Emergency Contacts Name Discharge Info Relation Home Work Mobile Raissa Smith DISCHARGE CAREGIVER [3] Spouse [3] [phone] About your hospitalization You were admitted on:  March 13, 2017 You last received care in the:  [address] Department You were discharged on:  March 13, 2017 Unit phone number:  [phone] Why you were hospitalized Your primary diagnosis was:  Not on File Providers Seen During Your Hospitalizations Provider Role Specialty Primary office phone Tono Castellon MD Attending Provider Hepatology [phone] Your Primary Care Physician (PCP) Primary Care Physician Office Phone Office Fax [address] [address] [phone] Follow-up Information None Current Discharge Medication List  
  
ASK your doctor about these medications Dose & Instructions Dispensing Information Comments Morning Noon Evening Bedtime  
 bumetanide 1 mg tablet Commonly known as:  [place] Your last dose was: Your next dose is: Other:  _________ Dose:  2 mg Take 2 Tabs by mouth two (2) times a day. Resume on 3/6/17. Quantity:  60 Tab Refills:  1 CULTURELLE PROBIOTICS PO Your last dose was: Your next dose is: Other:  _________ Dose:  1 Cap Take 1 Cap by mouth every evening. Refills:  0  
     
   
   
   
  
 * levothyroxine 200 mcg tablet Commonly known as:  SYNTHROID Your last dose was: Your next dose is: Other:  _________ Dose:  400 mcg Take 400 mcg by mouth two (2) days a week. Takes 400 mcg Sat, Sun Refills:  0  
     
   
   
   
  
 * levothyroxine 200 mcg tablet Commonly known as:  SYNTHROID Your last dose was: Your next dose is: Other:  _________ Dose:  200 mcg Take 200 mcg by mouth five (5) days a week. Daily Mon through Fri Refills:  0 LORazepam 1 mg tablet Commonly known as:  ATIVAN Your last dose was: Your next dose is: Other:  _________ Dose:  1 mg Take 1 mg by mouth nightly. Refills:  0  
     
   
   
   
  
 magnesium oxide 400 mg Cap Your last dose was: Your next dose is: Other:  _________ Dose:  400 mg Take 400 mg by mouth daily. (ran out) Refills:  0  
     
   
   
   
  
 * NovoLOG Mix 70-30 FlexPen 100 unit/mL (70-30) Inpn Generic drug:  insulin aspart protamine/insulin aspart Your last dose was: Your next dose is: Other:  _________ Dose:  50 Units 50 Units by SubCUTAneous route Before breakfast and dinner. Refills:  0  
     
   
   
   
  
 * NovoLOG Mix 70-30 FlexPen 100 unit/mL (70-30) Inpn Generic drug:  insulin aspart protamine/insulin aspart Your last dose was: Your next dose is: Other:  _________ Dose:  10 Units 10 Units by SubCUTAneous route nightly as needed (if BS >190 takes 10 units). Refills:  0  
     
   
   
   
  
 potassium 99 mg tablet Your last dose was: Your next dose is: Other:  _________ Dose:  99 mg Take 99 mg by mouth every evening. Refills:  0  
     
   
   
   
  
 traMADol 50 mg tablet Commonly known as:  ULTRAM  
   
Your last dose was: Your next dose is: Other:  _________ Dose:  50 mg Take 1 Tab by mouth every six (6) hours as needed. Max Daily Amount: 200 mg. Quantity:  30 Tab Refills:  0  
     
   
   
   
  
 VITAMIN B-12 1,000 mcg tablet Generic drug:  cyanocobalamin Your last dose was: Your next dose is: Other:  _________ Dose:  1000 mcg Take 1,000 mcg by mouth daily. Refills:  0  
     
   
   
   
  
 VITAMIN D3 PO Your last dose was: Your next dose is: Other:  _________ Dose:  67346 Units Take 10,000 Units by mouth daily. Refills:  0  
     
   
   
   
  
 * Notice: This list has 4 medication(s) that are the same as other medications prescribed for you. Read the directions carefully, and ask your doctor or other care provider to review them with you. Discharge Instructions [address] [facility] Department of Interventional Radiology PARACENTESIS DISCHARGE INSTRUCTIONS General Information: 
During this procedure, the doctor will insert a needle into the abdomen to drain fluid. After the procedure, you will be able to take a deep breath much easier. The site of the puncture may ooze the first day. This will decrease and eventually stop. Paracentesis (draining fluid from the abdomen) sometimes makes patients hypotensive (low blood pressure). Your doctor may order for you to receive fluids or albumin (a volume booster) during the procedure through an IV site. Home Care Instructions: 
Keep the puncture site clean and dry. No tub baths or swimming until puncture site heals. Showering is acceptable. Resume your normal diet, and resume your normal activity slowly and as you tolerate. If you are short of breath, rest. If shortness of breath does not ease, please call your ordering doctor. Fluid can re-accumulate in the chest and/or in the abdomen.  If this should occur, your doctor needs to know as you may need to have the procedure done again. Call If: 
   You should call your Physician and/or the Radiology Nurse if you notice any signs of infection, like pus draining, or if it is swollen or reddened. Also call if you have a fever, or if you are bleeding from the puncture site more than a small amount on the dressing. Call if the puncture site keeps draining fluid. Some oozing is to be expected, but should slow and then stop. Call if you feel like you have pressure in your abdomen. SEEK IMMEDIATE CARE OR CALL 911 IF YOU SUDDENLY HAVE TROUBLE BREATHING, OR IF YOUR LIPS TURN BLUE, OR IF YOU NOTICE BLOOD IN YOUR SPUTUM. Follow-Up Instructions: Please see your ordering doctor as he/she has requested. To Reach Us: Side effects of sedation medications and other medications used today have been reviewed. Notify us of nausea, itching, hives, dizziness, or anything else out of the ordinary. Should you experience any of these significant changes, please call [phone] between the hours of 7:30 am and 10 pm or [phone] after hours. After hours, ask the  to page the [address] Technologist, and describe the problem to the technologist.  
 
 paracentesis output today was 10,060 Date: 3/13/2017 Discharging Nurse: Samanta Sexton RN Discharge Orders None Introducing [facility]! Dear Rj Shepherd: Thank you for requesting a [employer] account. Our records indicate that you already have an active [employer] account. You can access your account anytime at [email or web address] Did you know that you can access your hospital and ER discharge instructions at any time in [employer]? You can also review all of your test results from your hospital stay or ER visit. Additional Information If you have questions, please visit the Frequently Asked Questions section of the [employer] website at [email or web address] Remember, MyChart is NOT to be used for urgent needs. For medical emergencies, dial 911. Now available from your iPhone and Android! General Information Please provide this summary of care documentation to your next provider. Patient Signature:  ____________________________________________________________ Date:  ____________________________________________________________  
  
Deondre Mais Provider Signature:  ____________________________________________________________ Date:  ____________________________________________________________

## 2017-03-13 NOTE — PROGRESS NOTES
Luly Hall MD, STEPHANIE Cantrell PA-C Rip Cleaver, MD, MD Ileana Driscoll NP Pleas Pluck, NP        [address]     [address] [place] [address], [address]     [phone]     FAX: [address] [place] [address], [id number] [place] [address], [address] [facility] [address]     [phone]     FAX: [phone]         Patient Care Team:  Susi Best MD as PCP - General (Family Practice)  Mikhail Mojica MD as Surgeon (General Surgery)  Myriam Yeboah RN as Nurse Navigator      Problem List  Date Reviewed: 2/6/2017          Codes Class Noted    Hypotension ICD-10-CM: I95.9  ICD-9-CM: 458.9  2/28/2017        YANIV (acute kidney injury) [facility]) ICD-10-CM: N17.9  ICD-9-CM: 584.9  2/28/2017        Hepatocellular carcinoma ([facility] ICD-10-CM: C22.0  ICD-9-CM: 155.0  2/6/2017        Thrombocytopenia ([facility] ICD-10-CM: D69.6  ICD-9-CM: 287.5  11/10/2016        Cirrhosis ([facility] ICD-10-CM: K74.60  ICD-9-CM: 571.5  10/30/2016        Chronic diastolic heart failure ([facility] ICD-10-CM: I50.32  ICD-9-CM: 428.32  11/9/2011        Acute diastolic heart failure (HCC) ICD-10-CM: I50.31  ICD-9-CM: 428.31  Unknown        Diabetes mellitus, type 2 (HCC) ICD-10-CM: E11.9  ICD-9-CM: 250.00  Unknown        Hyperlipidemia ICD-10-CM: E78.5  ICD-9-CM: 272.4  Unknown        COPD (chronic obstructive pulmonary disease) ([facility] ICD-10-CM: J44.9  ICD-9-CM: 843  Unknown        Colon polyps ICD-10-CM: K63.5  ICD-9-CM: 211.3  Unknown        Hypothyroid ICD-10-CM: E03.9  ICD-9-CM: 244.9  Unknown              Kristi Darling returns to the [address] for management of cryptogenic cirrhosis and Hepatocellular carcinoma.   The active problem list, all pertinent past medical history, medications, endoscopic studies, radiologic findings and laboratory findings related to the liver disorder were reviewed with the patient. The patient is a 78 y.o.  male who was found to have chronic liver disease and cirrhosis in 10/2016 when he underwent cholecystectomy. He was found to have hepatocellular carcinoma in 10/2016. MRI in 10/08967 demonstrated several liver mass lesions in segments 3, 6 and 7. An AFP in 11/2016 was 12,000. HCC was treated with Y-90 radioembolization/TARE to the largest mass in the left right lobe in 1/2016. The patient developed ascites for the first time in 1/2016. He underwent large volume paracentesis. He has had ascites leak from the puncture site. There was some mild edema but this has resolved without diuretics. The patient has not developed hepatic encephalopathy. The patient does not have esophageal or gastric varices by EGD in 12/2016. .     The patient has developed worsening fatigue, swelling of the abdomen. The patient completes all daily activities without any functional limitations. The patient has not experienced fatigue, pain in the right side over the liver, problems concentrating, swelling of the abdomen, swelling of the lower extremities, hematemesis, hematochezia. ALLERGIES  Allergies   Allergen Reactions    Lyrica [Pregabalin] Hives       MEDICATIONS  Current Outpatient Prescriptions   Medication Sig    sodium bicarbonate 325 mg tablet Take 1 Tab by mouth two (2) times a day.  bumetanide (BUMEX) 1 mg tablet Take 2 Tabs by mouth two (2) times a day. Resume on 3/6/17.  traMADol (ULTRAM) 50 mg tablet Take 1 Tab by mouth every six (6) hours as needed. Max Daily Amount: 200 mg.    CHOLECALCIFEROL, VITAMIN D3, (VITAMIN D3 PO) Take 10,000 Units by mouth daily.  insulin aspart protamine/insulin aspart (NOVOLOG MIX 70-30 FLEXPEN) 100 unit/mL (70-30) inpn 10 Units by SubCUTAneous route nightly as needed (if BS >190 takes 10 units).     levothyroxine (SYNTHROID) 200 mcg tablet Take 200 mcg by mouth five (5) days a week. Daily Mon through Fri    potassium 99 mg tablet Take 99 mg by mouth every evening.  insulin aspart protamine/insulin aspart (NOVOLOG MIX 70-30 FLEXPEN) 100 unit/mL (70-30) inpn 50 Units by SubCUTAneous route Before breakfast and dinner.  L. RHAMNOSUS GG/INULIN (CULTURELLE PROBIOTICS PO) Take 1 Cap by mouth every evening.  levothyroxine (SYNTHROID) 200 mcg tablet Take 400 mcg by mouth two (2) days a week. Takes 400 mcg Sat, Sun    magnesium oxide 400 mg cap Take 400 mg by mouth daily. (ran out)    cyanocobalamin (VITAMIN B-12) 1,000 mcg tablet Take 1,000 mcg by mouth daily.  lorazepam (ATIVAN) 1 mg tablet Take 1 mg by mouth nightly. No current facility-administered medications for this visit. SYSTEM REVIEW NOT RELATED TO LIVER DISEASE OR REVIEWED ABOVE:  Constitution systems: Negative for fever, chills, weight gain, weight loss. Eyes: Negative for visual changes. ENT: Negative for sore throat, painful swallowing. Respiratory: Negative for cough, hemoptysis, SOB. Cardiology: Negative for chest pain, palpitations. GI:  Negative for constipation or diarrhea. : Negative for urinary frequency, dysuria, hematuria, nocturia. Skin: Negative for rash. Hematology: Negative for easy bruising, blood clots. Musculo-skelatal: Negative for back pain, muscle pain, weakness. Neurologic: Diabetic neuropathy. Psychology: Negative for anxiety, depression. FAMILY HISTORY:  The father  of MI. The mother  of brain tumor. There is no family history of liver disease. SOCIAL HISTORY:  The patient is . The patient has 2 children, and 2 grandchildren. The patient stopped using tobacco products in 10/2011. The patient has previously consumed alcohol on rare social occasions never in excess. 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         PHYSICAL EXAMINATION:  Visit Vitals    BP 124/70    Pulse 82    Temp 97.9 °F (36.6 °C) (Tympanic)    Ht 6' 2\" (1.88 m)    Wt 280 lb (127 kg)    SpO2 98%    BMI 35.95 kg/m2     General: No acute distress. Eyes: Sclera anicteric. ENT: No oral lesions. Thyroid normal.  Nodes: No adenopathy. Skin: No spider angiomata. No jaundice. No palmar erythema. Respiratory: Lungs clear to auscultation. Cardiovascular: Regular heart rate. No murmurs. No JVD. Abdomen: Soft non-tender. Liver size normal to percussion/palpation. Spleen not palpable. No obvious ascites. Extremities: No edema. No muscle wasting. No gross arthritic changes. Neurologic: Alert and oriented. Cranial nerves grossly intact. No asterixis.     LABORATORY STUDIES:  [facility] [address] & Units 1/27/2017 1/6/2017   WBC 4.1 - 11.1 K/uL 14.2 (H)    ANC 1.8 - 8.0 K/UL 11.9 (H)    HGB 12.1 - 17.0 g/dL 9.9 (L)     - 400 K/uL 175    INR 0.8 - 1.2     AST 15 - 37 U/L 50 (H) 36   ALT 12 - 78 U/L 37 27   Alk Phos 45 - 117 U/L 290 (H) 222 (H)   Bili, Total 0.2 - 1.0 MG/DL 1.1 (H) 0.6   Bili, Direct 0.00 - 0.40 mg/dL  0.22   Albumin 3.5 - 5.0 g/dL 2.6 (L) 3.4 (L)   BUN 6 - 20 MG/DL 49 (H) 34 (H)   Creat 0.70 - 1.30 MG/DL 2.06 (H) 1.96 (H)   Na 136 - 145 mmol/L 134 (L) 138   K 3.5 - 5.1 mmol/L 3.5 3.8   Cl 97 - 108 mmol/L 101 97   CO2 21 - 32 mmol/L 23 25   Glucose 65 - 100 mg/dL 212 (H) 103 (H)   Magnesium 1.6 - 2.4 mg/dL       Cancer Screening Latest Ref Rng & Units 1/6/2017 11/10/2016   AFP, Serum 0.0 - 8.0 ng/mL  39171.9 (H)   AFP-L3% 0.0 - 9.9 %  73.6 (H)   CA 19-9 0 - 35 U/mL 35    CEA 0.0 - 4.7 ng/mL 2.9      SEROLOGIES:  Serologies Latest Ref Rn 11/10/2016   Hep A Ab, Total Negative Negative   Hep B Surface Ag Negative Negative   Hep B Core Ab, Total Negative Negative   Hep B Surface AB QL  Non Reactive   Hep C Ab 0.0 - 0.9 s/co ratio <0.1   Ferritin 30 - 400 ng/mL 97   Iron % Saturation 15 - 55 % 21   LUIS A, IFA  Negative   C-ANCA Neg:<1:20 titer <1:20 P-ANCA Neg:<1:20 titer <1:20   ANCA Neg:<1:20 titer <1:20   ASMCA 0 - 19 Units 11   M2 Ab 0.0 - 20.0 Units 7.0   Alpha-1 antitrypsin level 90 - 200 mg/dL 195     LIVER HISTOLOGY:  Not available or performed    ENDOSCOPIC PROCEDURES:  Not available or performed    RADIOLOGY:  10/2016. Ultrasound of liver. Echogenic consistent with fatty liver. No liver mass lesions. No dilated bile ducts. No ascites. 10/2016. Dynamic MRI of liver. Changes consistent with cirrhosis. Multiple small enhancing liver masses in left lobe side of segment 4 with focal bile duct dilation portal vein thrombosis. 1/2017. Dynamic MRI liver. Changes consistent with cirrhosis. Multiple focal arterial phase enhancing lesions are redemonstrated with 2 lesions in segment 2 measuring 9 x 11 and 7 x 10 mm (series 8, image 17), a lesion in segment 4 measuring 12 x 19 mm of (8, 23), a lesion in segment 8 measuring 17 x 18 mm of (8, 22), a lesion in segment 7 measuring 4 mm (8, 22), and 8 x 10 mm lesion in segment 6 (8,40), There portal vein invasion into segment 4A portal vein branch (8, 113). There is large ascites. [address] TREATMENT:  1/2016. TARE to left lobe. ASSESSMENT AND PLAN:  Cryptogenic cirrhosis. 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 The patient, his wife and daughter were again made aware of the extend of [address] and that this was not curable but could be treated for palliation and prolongation in life. He has tolerated TARE to the left lobe well. Will proceed with TARE to the right lobe as long s liver function remains stable. Will start Nexavar 400 mg BID. Side effects of Nexavar including hand and foot syndrome and GI symptoms were discussed. Will repeat MRI to assess impact of [address] treatment 1 month after TARE. If he gets a second TARE will reimage 1 month after that treatment. MRI findings and marked elevated AFP are highly suspicious for HCC.   Will repeat the MRI since it has been 3 months to better assess the vague lesion seen in 10/2016 and refer for Y-90 radioembolizaiton. Ascites has developed since the last office visit. AScites was treated with large volume paracentesis at the time of TARE. He has had leak of ascites from the puncture site. It was suggested that he lay with his left side down with puncture site up to reduce leak and allow tract to heal.  He has CKD and will probably not be able to increase the dose of diuretics. Will perform laboratory testing to monitor liver function and degree of liver injury. This included BMP, hepatic panel, CBC with platelet count, INR. Will repeat AFP. The patient has depressed but stable liver function. The CTP is 8. Child class B. The MELD score is 20. Serum creatinine has increased to 2.096 mg. He is on no diuretics. Hepatic encephalopathy has not developed to date. There is no need for treatment with lactulose and/or Xifaxan at this time. No need to restrict dietary protein at this time. The patient was directed to continue all current medications at the current dosages. There are no contraindications for the patient to take any medications that are necessary for treatment of other medical issues. The patient was counseled regarding alcohol consumption. Thrombocytopenia secondary to cirrhosis. There is no evidence of overt bleeding. There is no indication for platelet transfusions or pharmacologic treatment to increase the platelet count. Bone density to assess for osteoporosis has not been performed. Vaccination for viral hepatitis A and B is recommended since the patient has no serologic evidence of previous exposure or vaccination with immunity. All of the above issues were discussed with the patient. All questions were answered. The patient expressed a clear understanding of the above. [facility] in 4 weeks.       Latisha Martinez Sofia Ramírez, [address] of [facility] [address] Daniela Gonzales 7  [facility]  22.  [phone]

## 2017-03-13 NOTE — IP AVS SNAPSHOT
Current Discharge Medication List  
  
ASK your doctor about these medications Dose & Instructions Dispensing Information Comments Morning Noon Evening Bedtime  
 bumetanide 1 mg tablet Commonly known as:  Keyana Lakeer Your next dose is: Today, Tomorrow Comments:  __________ Dose:  2 mg Take 2 Tabs by mouth two (2) times a day. Resume on 3/6/17. Quantity:  60 Tab Refills:  1 CULTURELLE PROBIOTICS PO Your next dose is: Today, Tomorrow Comments:  __________ Dose:  1 Cap Take 1 Cap by mouth every evening. Refills:  0  
     
   
   
   
  
 * levothyroxine 200 mcg tablet Commonly known as:  SYNTHROID Your next dose is: Today, Tomorrow Comments:  __________ Dose:  400 mcg Take 400 mcg by mouth two (2) days a week. Takes 400 mcg Sat, Sun Refills:  0  
     
   
   
   
  
 * levothyroxine 200 mcg tablet Commonly known as:  SYNTHROID Your next dose is: Today, Tomorrow Comments:  __________ Dose:  200 mcg Take 200 mcg by mouth five (5) days a week. Daily Mon through Fri Refills:  0 LORazepam 1 mg tablet Commonly known as:  ATIVAN Your next dose is: Today, Tomorrow Comments:  __________ Dose:  1 mg Take 1 mg by mouth nightly. Refills:  0  
     
   
   
   
  
 magnesium oxide 400 mg Cap Your next dose is: Today, Tomorrow Comments:  __________ Dose:  400 mg Take 400 mg by mouth daily. (ran out) Refills:  0  
     
   
   
   
  
 * NovoLOG Mix 70-30 FlexPen 100 unit/mL (70-30) Inpn Generic drug:  insulin aspart protamine/insulin aspart Your next dose is: Today, Tomorrow Comments:  __________ Dose:  50 Units 50 Units by SubCUTAneous route Before breakfast and dinner. Refills:  0 * NovoLOG Mix 70-30 FlexPen 100 unit/mL (70-30) Inpn Generic drug:  insulin aspart protamine/insulin aspart Your next dose is: Today, Tomorrow Comments:  __________ Dose:  10 Units 10 Units by SubCUTAneous route nightly as needed (if BS >190 takes 10 units). Refills:  0  
     
   
   
   
  
 potassium 99 mg tablet Your next dose is: Today, Tomorrow Comments:  __________ Dose:  99 mg Take 99 mg by mouth every evening. Refills:  0  
     
   
   
   
  
 traMADol 50 mg tablet Commonly known as:  ULTRAM  
   
Your next dose is: Today, Tomorrow Comments:  __________ Dose:  50 mg Take 1 Tab by mouth every six (6) hours as needed. Max Daily Amount: 200 mg. Quantity:  30 Tab Refills:  0  
     
   
   
   
  
 VITAMIN B-12 1,000 mcg tablet Generic drug:  cyanocobalamin Your next dose is: Today, Tomorrow Comments:  __________ Dose:  1000 mcg Take 1,000 mcg by mouth daily. Refills:  0  
     
   
   
   
  
 VITAMIN D3 PO Your next dose is: Today, Tomorrow Comments:  __________ Dose:  56672 Units Take 10,000 Units by mouth daily. Refills:  0  
     
   
   
   
  
 * Notice: This list has 4 medication(s) that are the same as other medications prescribed for you. Read the directions carefully, and ask your doctor or other care provider to review them with you.

## 2017-03-13 NOTE — DISCHARGE INSTRUCTIONS
[address] [facility]  Department of Interventional Radiology      PARACENTESIS DISCHARGE INSTRUCTIONS    General Information:  During this procedure, the doctor will insert a needle into the abdomen to drain fluid. After the procedure, you will be able to take a deep breath much easier. The site of the puncture may ooze the first day. This will decrease and eventually stop. Paracentesis (draining fluid from the abdomen) sometimes makes patients hypotensive (low blood pressure). Your doctor may order for you to receive fluids or albumin (a volume booster) during the procedure through an IV site. Home Care Instructions:  Keep the puncture site clean and dry. No tub baths or swimming until puncture site heals. Showering is acceptable. Resume your normal diet, and resume your normal activity slowly and as you tolerate. If you are short of breath, rest. If shortness of breath does not ease, please call your ordering doctor. Fluid can re-accumulate in the chest and/or in the abdomen. If this should occur, your doctor needs to know as you may need to have the procedure done again. Call If:     You should call your Physician and/or the Radiology Nurse if you notice any signs of infection, like pus draining, or if it is swollen or reddened. Also call if you have a fever, or if you are bleeding from the puncture site more than a small amount on the dressing. Call if the puncture site keeps draining fluid. Some oozing is to be expected, but should slow and then stop. Call if you feel like you have pressure in your abdomen. SEEK IMMEDIATE CARE OR CALL 911 IF YOU SUDDENLY HAVE TROUBLE BREATHING, OR IF YOUR LIPS TURN BLUE, OR IF YOU NOTICE BLOOD IN YOUR SPUTUM. Follow-Up Instructions: Please see your ordering doctor as he/she has requested. To Reach Us: Side effects of sedation medications and other medications used today have been reviewed.   Notify us of nausea, itching, hives, dizziness, or anything else out of the ordinary. Should you experience any of these significant changes, please call [phone] between the hours of 7:30 am and 10 pm or [phone] after hours.  After hours, ask the  to page the [address] Technologist, and describe the problem to the technologist.      paracentesis output today was 10,060      Date: 3/13/2017  Discharging Nurse: Martha Richardson RN

## 2017-03-13 NOTE — MR AVS SNAPSHOT
Visit Information Date & Time Provider Department Dept. Phone Encounter #  
 3/13/2017  9:30 AM [facility] [address] [place] [address] [id number] Upcoming Health Maintenance Date Due HEMOGLOBIN A1C Q6M 1940 LIPID PANEL Q1 1940 FOOT EXAM Q1 12/30/1952 EYE EXAM RETINAL OR DILATED Q1 12/30/1952 DTaP/Tdap/Td series (1 - Tdap) 12/30/1963 FOBT Q 1 YEAR AGE 50-75 12/30/1992 ZOSTER VACCINE AGE 60> 12/30/2002 GLAUCOMA SCREENING Q2Y 12/30/2007 Pneumococcal 65+ High/Highest Risk (1 of 2 - PCV13) 12/30/2007 MEDICARE YEARLY EXAM 12/30/2007 INFLUENZA AGE 9 TO ADULT 8/1/2016 MICROALBUMIN Q1 1/18/2018 Allergies as of 3/13/2017  Review Complete On: 3/13/2017 By: Preston Cavazos LPN Severity Noted Reaction Type Reactions Lyrica [Pregabalin]  01/30/2017    Hives Current Immunizations  Never Reviewed No immunizations on file. Not reviewed this visit You Were Diagnosed With   
  
 Codes Comments Other ascites    -  Primary ICD-10-CM: R18.8 ICD-9-CM: 789.59 Vitals BP Pulse Temp Height(growth percentile) Weight(growth percentile) SpO2  
 124/70 82 97.9 °F (36.6 °C) (Tympanic) 6' 2\" (1.88 m) 280 lb (127 kg) 98% BMI Smoking Status 35.95 kg/m2 Former Smoker BMI and BSA Data Body Mass Index Body Surface Area 35.95 kg/m 2 2.58 m 2 Preferred Pharmacy Pharmacy Name Phone [id number] [address] [phone] Your Updated Medication List  
  
   
This list is accurate as of: 3/13/17 10:13 AM.  Always use your most recent med list.  
  
  
  
  
 bumetanide 1 mg tablet Commonly known as:  Shirkaycee Friedman Take 2 Tabs by mouth two (2) times a day. Resume on 3/6/17. CULTURELLE PROBIOTICS PO Take 1 Cap by mouth every evening. * levothyroxine 200 mcg tablet Commonly known as:  SYNTHROID Take 400 mcg by mouth two (2) days a week. Takes 400 mcg Sat, Sun  
  
 * levothyroxine 200 mcg tablet Commonly known as:  SYNTHROID Take 200 mcg by mouth five (5) days a week. Daily Mon through Fri LORazepam 1 mg tablet Commonly known as:  ATIVAN Take 1 mg by mouth nightly.  
  
 magnesium oxide 400 mg Cap Take 400 mg by mouth daily. (ran out) * NovoLOG Mix 70-30 FlexPen 100 unit/mL (70-30) Inpn Generic drug:  insulin aspart protamine/insulin aspart 50 Units by SubCUTAneous route Before breakfast and dinner. * NovoLOG Mix 70-30 FlexPen 100 unit/mL (70-30) Inpn Generic drug:  insulin aspart protamine/insulin aspart 10 Units by SubCUTAneous route nightly as needed (if BS >190 takes 10 units). potassium 99 mg tablet Take 99 mg by mouth every evening. traMADol 50 mg tablet Commonly known as:  ULTRAM  
Take 1 Tab by mouth every six (6) hours as needed. Max Daily Amount: 200 mg. VITAMIN B-12 1,000 mcg tablet Generic drug:  cyanocobalamin Take 1,000 mcg by mouth daily. VITAMIN D3 PO Take 10,000 Units by mouth daily. * Notice: This list has 4 medication(s) that are the same as other medications prescribed for you. Read the directions carefully, and ask your doctor or other care provider to review them with you. We Performed the Following AFP WITH AFP-L3% [ODO79451 Custom] CBC WITH AUTOMATED DIFF [88882 CPT(R)] HEPATIC FUNCTION PANEL [02316 CPT(R)] METABOLIC PANEL, BASIC [27439 CPT(R)] To-Do List   
 03/13/2017 Imaging:  US PARACENTESIS ABD W IMAGING Introducing [facility]! Dear Ellen Myersy: Thank you for requesting a [employer] account. Our records indicate that you already have an active [employer] account. You can access your account anytime at [email or web address] Did you know that you can access your hospital and ER discharge instructions at any time in [employer]? You can also review all of your test results from your hospital stay or ER visit. Additional Information If you have questions, please visit the Frequently Asked Questions section of the [employer] website at [email or web address] Remember, [employer] is NOT to be used for urgent needs. For medical emergencies, dial 911. Now available from your iPhone and Android! Please provide this summary of care documentation to your next provider. Your primary care clinician is listed as Baeza Bonus. If you have any questions after today's visit, please call [phone].

## 2017-03-20 NOTE — PROGRESS NOTES
Pt to US for Paracentesis. 1145- Procedure complete. Pt tolerated well. 70214 ML Straw Colored Fluid Removed. Site C/D/I pt verbalized understanding of d/c instructions. 1200-Pt d/c via w/c accompanied by wife and DTR. Katiana Mccullough RN

## 2017-03-20 NOTE — PROGRESS NOTES
Received phone call from patient's wife, Deni Ramos, who reports the patient is miserable from ascites. He is scheduled for a paracentesis today. Deni Ramos asked if Aspira catheter could be placed today. Phone call placed to angio. Patient will not be able to have Aspira catheter placed today. Scheduled procedure to be performed on 3/22. Returned phone call to Deni Ramos. Notified of procedure planned for 3/22; notified to arrive to [facility] Admitting at 9 am, reviewed NPO status. Informed Deni Rachel that Hospice order would be placed with anticipated home visit on 3/23. Deni Mengkunal does not have preference of agency. Will send to [facility].

## 2017-03-20 NOTE — DISCHARGE INSTRUCTIONS
[address] [facility] Procedures/Radiology Department      Radiologist:  Dr. Priscilla Schumacher    Date:      Paracentesis Discharge Instructions    You may have an aching pain in your abdomen at the puncture site tonight. You may take Tylenol, as directed on the label, for the pain or discomfort. Resume your previous diet and follow the medication reconciliation form. Rest today. Watch for signs of infection at the puncture site:  redness, swelling, pus, fever or chills. If this occurs, call your doctor immediately or go to the nearest Emergency Room. If you experience severe sweating, severe abdominal pain, dizziness or faintness, go to the nearest Emergency Room immediately. Should you experience any of these significant changes, please call [phone] between the hours of 7:30 am and 10 pm or [phone] after hours.  After hours, ask the  to page the [address] Technologist, and describe the problem to the technologist.

## 2017-03-22 NOTE — H&P
Radiology History and Physical    Patient: Dixon Padilla 78 y.o. male       Chief Complaint: No chief complaint on file. History of Present Illness: ascites     History:    Past Medical History:   Diagnosis Date    [facility]     Arthritis     Cancer ([facility]     liver cancer/ cirrhosis    Cataract     [facility] ([facility] 11/9/2011    Colon polyps 2002    COPD (chronic obstructive pulmonary disease) (HCC)     no med use    Diabetes mellitus, type 2 ([facility] 1995    ED (erectile dysfunction)     Hyperlipidemia     Hypothyroid     Ill-defined condition     pt states he was given BP med to protect kidneys d/t diabetes    Ill-defined condition 12/20/2016    34 pound intention wt loss since early Nov 2016    Liver disease     abnormal MRI    LVH (left ventricular hypertrophy) due to hypertensive disease     Vitamin B 12 deficiency      Family History   Problem Relation Age of Onset    Heart Disease Father     Heart Attack Father     Cancer Mother      brain tumor    Cancer Sister      kidney     Social History     Social History    Marital status:      Spouse name: N/A    Number of children: N/A    Years of education: N/A     Occupational History    Not on file. Social History Main Topics    Smoking status: Former Smoker     Packs/day: 1.50    Smokeless tobacco: Former User     Quit date: 10/13/2010    Alcohol use No    Drug use: Not on file    Sexual activity: Not on file     Other Topics Concern    Not on file     Social History Narrative       Allergies:    Allergies   Allergen Reactions    Lyrica [Pregabalin] Hives       Current Medications:  Current Facility-Administered Medications   Medication Dose Route Frequency    fentaNYL citrate (PF) injection 200 mcg  200 mcg IntraVENous Rad Multiple    midazolam (VERSED) injection 5 mg  5 mg IntraVENous Rad Multiple    0.9% sodium chloride infusion  25 mL/hr IntraVENous CONTINUOUS    ceFAZolin (ANCEF) 3 g in 0.9%  ml IVPB  3 g IntraVENous ONCE    sodium chloride (NS) 0.9 % flush        albumin human 25% (BUMINATE) solution 25 g  25 g IntraVENous Multiple        Physical Exam:  Blood pressure 113/44, pulse 68, temperature 98.2 °F (36.8 °C), resp. rate 17, height 6' 2\" (1.88 m), weight 121.1 kg (267 lb), SpO2 99 %. LUNG: clear to auscultation bilaterally, HEART: regular rate and rhythm, S1, S2 normal, no murmur, click, rub or gallop      Alerts:    Hospital Problems  Date Reviewed: 2/6/2017    None          Laboratory:    No results for input(s): HGB, HCT, WBC, PLT, INR, BUN, CREA, K, CRCLT, HGBEXT, HCTEXT, PLTEXT in the last 72 hours. No lab exists for component: PTT, PT, INREXT      Plan of Care/Planned Procedure:  Risks, benefits, and alternatives reviewed with patient and he agrees to proceed with the procedure.      Plan is for abdominal peritoneal drainage catheter placement for ascites       Indigo Puckett MD

## 2017-03-22 NOTE — DISCHARGE INSTRUCTIONS
[address] [facility]  Department of Interventional Radiology    GENERAL DRAIN DISCHARGE INSTRUCTIONS    General Information:     A plastic catheter has been inserted through your skin and into area that needs to be drained. Your doctor ordered this procedure to be done in the event of an abscess, or other fluid collection in your body. You will notice a drainage bag attached to the catheter. When the fluid has all been removed, your doctor will send you back to us for the removal of the catheter. If you are going home with the catheter in place, your doctor may order a home health nurse to come to your house and assist with the care of the catheter. Your doctor will most likely order antibiotic tablets for you to take while you have this tube. Home Care Instructions: You can resume your regular diet and medication regimen. Do not drink alcohol, drive, or make any important legal decisions in the next 24 hours. Do not lift anything heavier than a gallon of milk, or do anything strenuous for the next 24 hours. You should not shower for 24 hours. You should cover the tube with plastic wrap and tape to keep it dry when you shower. You can disconnect the drainage bag while showering. The home health nurse or the nurse who discharges from the hospital will teach you how to do this. Do not take a bath, swim or immerse yourself in water as long as you have this drainage tube. You should clean the tube and change the dressing every  48 hours and as needed. Keep the dressing clean and dry. Keep up with how much drainage you get from the tube and report this to your doctor on each visit. Call If:     You should call your Physician and/or the Radiology Nurse if you have any bleeding other than a small spot on your bandage. Call if you have any signs of infection, fever, or increased pain at the site of the tube.  Call if you should have leakage around the tube, an increased yellowing of the skin, increased pain in the abdomen, a change in the amount or appearance of the fluid, or if the tube gets pulled out some or all the way out. Follow-Up Instructions: Please see your ordering doctor as he/she has requested.      To Reach Us:  [phone] to 10 pm then call [phone] after 10 pm ask for Angio on call nurse  Angio direct number 7-5pm [phone]    Patient Signature:  Date: 3/22/2017  Discharging Nurse: Duncan Lamb RN

## 2017-03-27 NOTE — IP AVS SNAPSHOT
Current Discharge Medication List  
  
ASK your doctor about these medications Dose & Instructions Dispensing Information Comments Morning Noon Evening Bedtime  
 acetaminophen 650 mg suppository Commonly known as:  TYLENOL Your last dose was: Your next dose is:    
   
   
 Dose:  650 mg Insert 650 mg into rectum every four (4) hours as needed for Fever. Fever:  Acetaminophen (Tylenol)  650mg supp(#6)  Sig:  Insert one supp per rectum every 4 hours as needed for fever; if no relief in 24 hours, call [facility]. Side Effects:  Rectal irritation/burning/itching Quantity:  1 Suppository Refills:  0  
     
   
   
   
  
 BISAC-EVAC 10 mg suppository Generic drug:  bisacodyl Your last dose was: Your next dose is:    
   
   
 Dose:  10 mg Insert 10 mg into rectum daily as needed (constipation). Sig:  Insert one supp per rectum daily as needed for constipation; if no relief in 24 hours, call [facility]. Side Effects:  Rectal irritation/burning/itching, mild abdominal cramps, Refills:  0  
     
   
   
   
  
 bumetanide 2 mg tablet Commonly known as:  Malachy Bile Your last dose was: Your next dose is:    
   
   
 Dose:  2 mg Take 2 mg by mouth daily. Refills:  0  
     
   
   
   
  
 haloperidol 2 mg/mL oral concentrate Commonly known as:  HALDOL Your last dose was: Your next dose is:    
   
   
 Dose:  0.5 mg Take 0.5 mg by mouth every four (4) hours as needed (agitation). Agitation:  Haloperidol (Haldol)  2mg/ml, 15 ml bottle (#1) Sig:  Give 0.25ml (0.5mg) PO/SL every 4 hour x 2 doses as needed agitation. If still no relief, call [facility]. Side Effects:  Drowsiness, dizziness, dry mouth, difficulty urinating, headache Refills:  0  
     
   
   
   
  
 * levothyroxine 200 mcg tablet Commonly known as:  SYNTHROID Your last dose was: Your next dose is: Dose:  400 mcg Take 400 mcg by mouth two (2) days a week. Takes 400 mcg Sat, Sun Refills:  0  
     
   
   
   
  
 * levothyroxine 200 mcg tablet Commonly known as:  SYNTHROID Your last dose was: Your next dose is:    
   
   
 Dose:  200 mcg Take 200 mcg by mouth five (5) days a week. Daily Mon through Fri Refills:  0  
     
   
   
   
  
 * LORazepam 2 mg/mL concentrated solution Commonly known as:  INTENSOL Your last dose was: Your next dose is:    
   
   
 Dose:  0.5 mg Take 0.5 mg by mouth every one (1) hour as needed for Agitation or Anxiety. Anxiety:  Lorazepam (Ativan)  2mg/ml, (5 prefilled syringes)Sig:  Give 0.25ml (0.5mg) PO/SL every hour x 2 doses as needed anxiety. If still no relief, call [facility]. Side Effects:  Drowsiness, dizziness, loss of coordination, headache, nausea, blurred vision Quantity:  5 syringes Refills:  0  
     
   
   
   
  
 * LORazepam 1 mg tablet Commonly known as:  ATIVAN Your last dose was: Your next dose is:    
   
   
 Dose:  1 mg Take 1 mg by mouth nightly. Refills:  0  
     
   
   
   
  
 magnesium oxide 400 mg Cap Your last dose was: Your next dose is:    
   
   
 Dose:  400 mg Take 400 mg by mouth daily. (ran out) Refills:  0  
     
   
   
   
  
 morphine 100 mg/5 mL (20 mg/mL) concentrated solution Commonly known as:  Angel Hancock Your last dose was: Your next dose is:    
   
   
 Dose:  5 mg  
5 mg by SubLINGual route every one (1) hour as needed for Pain (dyspnea). Pain/Shortness of Breath:  Morphine liquid 20mg/ml, (prefilled syringes)  Sig:  Give 5 mg (0.25mL) PO/SL every 1 hour x 2 doses as needed for pain. Shortness of breath. If no relief, call [facility] Quantity:  8 syringes Refills:  0  
     
   
   
   
  
 * NovoLOG Mix 70-30 FlexPen 100 unit/mL (70-30) Inpn Generic drug:  insulin aspart protamine/insulin aspart Your last dose was: Your next dose is:    
   
   
 Dose:  50 Units 50 Units by SubCUTAneous route Daily (before breakfast). Refills:  0  
     
   
   
   
  
 * NovoLOG Mix 70-30 FlexPen 100 unit/mL (70-30) Inpn Generic drug:  insulin aspart protamine/insulin aspart Your last dose was: Your next dose is:    
   
   
 Dose:  45 Units 45 Units by SubCUTAneous route nightly as needed (if BS >190 takes 10 units). Refills:  0  
     
   
   
   
  
 traMADol 50 mg tablet Commonly known as:  ULTRAM  
   
Your last dose was: Your next dose is:    
   
   
 Dose:  50 mg Take 1 Tab by mouth every six (6) hours as needed. Max Daily Amount: 200 mg. Quantity:  30 Tab Refills:  0  
     
   
   
   
  
 VITAMIN B-12 1,000 mcg tablet Generic drug:  cyanocobalamin Your last dose was: Your next dose is:    
   
   
 Dose:  1000 mcg Take 1,000 mcg by mouth daily. Refills:  0  
     
   
   
   
  
 VITAMIN D3 PO Your last dose was: Your next dose is:    
   
   
 Dose:  52525 Units Take 10,000 Units by mouth daily. Refills:  0  
     
   
   
   
  
 * Notice: This list has 6 medication(s) that are the same as other medications prescribed for you. Read the directions carefully, and ask your doctor or other care provider to review them with you.

## 2017-03-27 NOTE — ROUTINE PROCESS
Aspira discharge packet given to pt. and family, drainage of aspira demonstrated with family. Kit given with extra drainage bags. Family and pt. verbalized understanding of d/c instructions. Has hospice following as well.

## 2017-03-27 NOTE — DISCHARGE INSTRUCTIONS
[address] [facility]  Department of Interventional Radiology        Aspira  ABDOMINAL CATHETER  DISCHARGE INSTRUCTIONS    General Information:     A plastic catheter has been inserted through your skin and into  your abdomen. This has been done because you have needed frequent Paracentesis. This catheter will provide you a way to drain off the fluid as needed. Your doctor will order a home health nurse to help you learn to do the drain and to take care of the catheter. Home Care Instructions: You can resume your regular diet and medication regimen. Do not drink alcohol, drive, or make any important legal decisions in the next 24 hours. Do not lift anything heavier than a gallon of milk, or do anything strenuous for the next 24 hours. You should not shower for 24 hours. You should cover the tube with plastic wrap and tape to keep it dry when you shower. Do not take a bath, swim or immerse yourself in water as long as you have this catheter. You should clean the tube and change the dressing every week and as needed. Keep the dressing clean and dry. Keep a journal of how often you drain the fluid, how much fluid you drain, and the character of the fluid. Call If:     You should call your Physician and/or the Radiology Nurse if you have any bleeding other than a small spot on your bandage. Call if you have any signs of infection, fever, or increased pain at the site of the tube. Call if you should have leakage around the tube, a change in the appearance of the fluid, or if the tube gets pulled out some or all the way out. Call us, your home health nurse, or your regular doctor if you have any concerns or questions about your catheter. Follow-Up Instructions: Please see your ordering doctor as he/she has requested. To Reach Us:  Side effects of sedation medications and other medications used today have been reviewed.   Notify us of nausea, itching, hives, dizziness, or anything else out of the ordinary. Should you experience any of these significant changes, please call [phone] between the hours of 7:30 am and 10 pm or [phone] after hours.  After hours, ask the  to page the [address] Technologist, and describe the problem to the technologist.   7,100 cc's drained today      Patient Signature:  Date: 3/27/2017  Discharging Nurse: Gloria Larson RN

## 2017-03-27 NOTE — H&P
Radiology History and Physical    Patient: Darryl Almazan 78 y.o. male       Chief Complaint: No chief complaint on file. History of Present Illness: ascites for aspira    History:    Past Medical History:   Diagnosis Date    [facility] ([facility]     Arthritis     Cancer ([facility]     liver cancer/ cirrhosis    Cataract     Chronic diastolic heart failure ([facility] 11/9/2011    Colon polyps 2002    COPD (chronic obstructive pulmonary disease) (HCC)     no med use    Diabetes mellitus, type 2 ([facility] 1995    ED (erectile dysfunction)     Hyperlipidemia     Hypothyroid     Ill-defined condition     pt states he was given BP med to protect kidneys d/t diabetes    Ill-defined condition 12/20/2016    34 pound intention wt loss since early Nov 2016    Liver disease     abnormal MRI    LVH (left ventricular hypertrophy) due to hypertensive disease     Vitamin B 12 deficiency      Family History   Problem Relation Age of Onset    Heart Disease Father     Heart Attack Father     Cancer Mother      brain tumor    Cancer Sister      kidney     Social History     Social History    Marital status:      Spouse name: N/A    Number of children: N/A    Years of education: N/A     Occupational History    Not on file. Social History Main Topics    Smoking status: Former Smoker     Packs/day: 1.50    Smokeless tobacco: Former User     Quit date: 10/13/2010    Alcohol use No    Drug use: Not on file    Sexual activity: Not on file     Other Topics Concern    Not on file     Social History Narrative       Allergies:    Allergies   Allergen Reactions    Lyrica [Pregabalin] Hives       Current Medications:  Current Facility-Administered Medications   Medication Dose Route Frequency    sodium chloride (NS) 0.9 % flush        fentaNYL citrate (PF) injection 200 mcg  200 mcg IntraVENous Multiple    midazolam (VERSED) injection 5 mg  5 mg IntraVENous Multiple    albumin human 25% (BUMINATE) solution 12.5 g  12.5 g IntraVENous Multiple        Physical Exam:  Blood pressure 124/68, pulse 79, temperature 98.4 °F (36.9 °C), resp. rate 19, height 6' 2\" (1.88 m), weight 120.2 kg (265 lb), SpO2 98 %. LUNG: clear to auscultation bilaterally, HEART: regular rate and rhythm      Alerts:    Hospital Problems  Date Reviewed: 2/6/2017    None          Laboratory:    No results for input(s): HGB, HCT, WBC, PLT, INR, BUN, CREA, K, CRCLT, HGBEXT, HCTEXT, PLTEXT in the last 72 hours. No lab exists for component: PTT, PT, INREXT      Plan of Care/Planned Procedure:  Risks, benefits, and alternatives reviewed with patient and he agrees to proceed with the procedure.        Keagan Richey MD

## 2017-03-27 NOTE — IP AVS SNAPSHOT
[address] [address] 
[phone] Patient: Lelo Fung MRN: [id number] You are allergic to the following Allergen Reactions Lyrica (Pregabalin) Hives Recent Documentation Height Weight BMI Smoking Status 1.88 m 120.2 kg 34.02 kg/m2 Former Smoker Emergency Contacts Name Discharge Info Relation Home Work Mobile Lena Boss DISCHARGE CAREGIVER [3] Spouse [3] [phone] About your hospitalization You were admitted on:  March 27, 2017 You last received care in the:  [facility] RAD ANGIO IR You were discharged on:  March 27, 2017 Unit phone number:  [phone] Why you were hospitalized Your primary diagnosis was:  Not on File Providers Seen During Your Hospitalizations Provider Role Specialty Primary office phone Penelope Johnson MD Attending Provider Radiology [phone] Your Primary Care Physician (PCP) Primary Care Physician Office Phone Office Fax [address] [address] [phone] Follow-up Information None Current Discharge Medication List  
  
ASK your doctor about these medications Dose & Instructions Dispensing Information Comments Morning Noon Evening Bedtime  
 acetaminophen 650 mg suppository Commonly known as:  TYLENOL Your last dose was: Your next dose is:    
   
   
 Dose:  650 mg Insert 650 mg into rectum every four (4) hours as needed for Fever. Fever:  Acetaminophen (Tylenol)  650mg supp(#6)  Sig:  Insert one supp per rectum every 4 hours as needed for fever; if no relief in 24 hours, call [facility]. Side Effects:  Rectal irritation/burning/itching Quantity:  1 Suppository Refills:  0  
     
   
   
   
  
 BISAC-EVAC 10 mg suppository Generic drug:  bisacodyl Your last dose was: Your next dose is: Dose:  10 mg Insert 10 mg into rectum daily as needed (constipation). Sig:  Insert one supp per rectum daily as needed for constipation; if no relief in 24 hours, call [facility]. Side Effects:  Rectal irritation/burning/itching, mild abdominal cramps, Refills:  0  
     
   
   
   
  
 bumetanide 2 mg tablet Commonly known as:  Marisue Days Your last dose was: Your next dose is:    
   
   
 Dose:  2 mg Take 2 mg by mouth daily. Refills:  0  
     
   
   
   
  
 haloperidol 2 mg/mL oral concentrate Commonly known as:  HALDOL Your last dose was: Your next dose is:    
   
   
 Dose:  0.5 mg Take 0.5 mg by mouth every four (4) hours as needed (agitation). Agitation:  Haloperidol (Haldol)  2mg/ml, 15 ml bottle (#1) Sig:  Give 0.25ml (0.5mg) PO/SL every 4 hour x 2 doses as needed agitation. If still no relief, call [facility]. Side Effects:  Drowsiness, dizziness, dry mouth, difficulty urinating, headache Refills:  0  
     
   
   
   
  
 * levothyroxine 200 mcg tablet Commonly known as:  SYNTHROID Your last dose was: Your next dose is:    
   
   
 Dose:  400 mcg Take 400 mcg by mouth two (2) days a week. Takes 400 mcg Sat, Sun Refills:  0  
     
   
   
   
  
 * levothyroxine 200 mcg tablet Commonly known as:  SYNTHROID Your last dose was: Your next dose is:    
   
   
 Dose:  200 mcg Take 200 mcg by mouth five (5) days a week. Daily Mon through Fri Refills:  0  
     
   
   
   
  
 * LORazepam 2 mg/mL concentrated solution Commonly known as:  INTENSOL Your last dose was: Your next dose is:    
   
   
 Dose:  0.5 mg Take 0.5 mg by mouth every one (1) hour as needed for Agitation or Anxiety. Anxiety:  Lorazepam (Ativan)  2mg/ml, (5 prefilled syringes)Sig:  Give 0.25ml (0.5mg) PO/SL every hour x 2 doses as needed anxiety.   If still no relief, call [facility]. Side Effects:  Drowsiness, dizziness, loss of coordination, headache, nausea, blurred vision Quantity:  5 syringes Refills:  0  
     
   
   
   
  
 * LORazepam 1 mg tablet Commonly known as:  ATIVAN Your last dose was: Your next dose is:    
   
   
 Dose:  1 mg Take 1 mg by mouth nightly. Refills:  0  
     
   
   
   
  
 magnesium oxide 400 mg Cap Your last dose was: Your next dose is:    
   
   
 Dose:  400 mg Take 400 mg by mouth daily. (ran out) Refills:  0  
     
   
   
   
  
 morphine 100 mg/5 mL (20 mg/mL) concentrated solution Commonly known as:  Sheyla Evens Your last dose was: Your next dose is:    
   
   
 Dose:  5 mg  
5 mg by SubLINGual route every one (1) hour as needed for Pain (dyspnea). Pain/Shortness of Breath:  Morphine liquid 20mg/ml, (prefilled syringes)  Sig:  Give 5 mg (0.25mL) PO/SL every 1 hour x 2 doses as needed for pain. Shortness of breath. If no relief, call [facility] Quantity:  8 syringes Refills:  0  
     
   
   
   
  
 * NovoLOG Mix 70-30 FlexPen 100 unit/mL (70-30) Inpn Generic drug:  insulin aspart protamine/insulin aspart Your last dose was: Your next dose is:    
   
   
 Dose:  50 Units 50 Units by SubCUTAneous route Daily (before breakfast). Refills:  0  
     
   
   
   
  
 * NovoLOG Mix 70-30 FlexPen 100 unit/mL (70-30) Inpn Generic drug:  insulin aspart protamine/insulin aspart Your last dose was: Your next dose is:    
   
   
 Dose:  45 Units 45 Units by SubCUTAneous route nightly as needed (if BS >190 takes 10 units). Refills:  0  
     
   
   
   
  
 traMADol 50 mg tablet Commonly known as:  ULTRAM  
   
Your last dose was: Your next dose is:    
   
   
 Dose:  50 mg Take 1 Tab by mouth every six (6) hours as needed. Max Daily Amount: 200 mg. Quantity:  30 Tab Refills:  0  
     
   
   
   
  
 VITAMIN B-12 1,000 mcg tablet Generic drug:  cyanocobalamin Your last dose was: Your next dose is:    
   
   
 Dose:  1000 mcg Take 1,000 mcg by mouth daily. Refills:  0  
     
   
   
   
  
 VITAMIN D3 PO Your last dose was: Your next dose is:    
   
   
 Dose:  25652 Units Take 10,000 Units by mouth daily. Refills:  0  
     
   
   
   
  
 * Notice: This list has 6 medication(s) that are the same as other medications prescribed for you. Read the directions carefully, and ask your doctor or other care provider to review them with you. Discharge Instructions [address] [facility] Department of Interventional Radiology [facility]  ABDOMINAL CATHETER  DISCHARGE INSTRUCTIONS General Information: A plastic catheter has been inserted through your skin and into  your abdomen. This has been done because you have needed frequent Paracentesis. This catheter will provide you a way to drain off the fluid as needed. Your doctor will order a home health nurse to help you learn to do the drain and to take care of the catheter. Home Care Instructions: You can resume your regular diet and medication regimen. Do not drink alcohol, drive, or make any important legal decisions in the next 24 hours. Do not lift anything heavier than a gallon of milk, or do anything strenuous for the next 24 hours. You should not shower for 24 hours. You should cover the tube with plastic wrap and tape to keep it dry when you shower. Do not take a bath, swim or immerse yourself in water as long as you have this catheter. You should clean the tube and change the dressing every week and as needed. Keep the dressing clean and dry. Keep a journal of how often you drain the fluid, how much fluid you drain, and the character of the fluid.    
 
Call If: 
 You should call your Physician and/or the Radiology Nurse if you have any bleeding other than a small spot on your bandage. Call if you have any signs of infection, fever, or increased pain at the site of the tube. Call if you should have leakage around the tube, a change in the appearance of the fluid, or if the tube gets pulled out some or all the way out. Call us, your home health nurse, or your regular doctor if you have any concerns or questions about your catheter. Follow-Up Instructions: Please see your ordering doctor as he/she has requested. To Reach Us:  Side effects of sedation medications and other medications used today have been reviewed. Notify us of nausea, itching, hives, dizziness, or anything else out of the ordinary. Should you experience any of these significant changes, please call [phone] between the hours of 7:30 am and 10 pm or [phone] after hours. After hours, ask the  to page the [address] Technologist, and describe the problem to the technologist.  
7,100 cc's drained today Patient Signature: 
Date: 3/27/2017 Discharging Nurse: Martha Richardson RN Discharge Orders None Introducing [facility] SERVICES! Dear Alvaro Leach: Thank you for requesting a [employer] account. Our records indicate that you already have an active [employer] account. You can access your account anytime at [email or web address] Did you know that you can access your hospital and ER discharge instructions at any time in [employer]? You can also review all of your test results from your hospital stay or ER visit. Additional Information If you have questions, please visit the Frequently Asked Questions section of the [employer] website at [email or web address] Remember, [employer] is NOT to be used for urgent needs. For medical emergencies, dial 911. Now available from your iPhone and Android! General Information Please provide this summary of care documentation to your next provider. Patient Signature:  ____________________________________________________________ Date:  ____________________________________________________________  
  
Shantal Feller Provider Signature:  ____________________________________________________________ Date:  ____________________________________________________________

## 2017-04-17 ENCOUNTER — HOME CARE VISIT (OUTPATIENT)
Dept: HOSPICE | Facility: HOSPICE | Age: 77
End: 2017-04-17
Payer: MEDICARE

## 2017-05-10 ENCOUNTER — DOCUMENTATION ONLY (OUTPATIENT)
Dept: CARDIOLOGY CLINIC | Age: 77
End: 2017-05-10

## 2019-01-11 NOTE — PROGRESS NOTES
During morning assessment patient's blood pressure was 85/46. Patient was asymptomatic. [facility] Medicine MD was informed and stated to continue to monitor. No new orders at this time. no